# Patient Record
Sex: FEMALE | Race: WHITE | Employment: OTHER | RURAL
[De-identification: names, ages, dates, MRNs, and addresses within clinical notes are randomized per-mention and may not be internally consistent; named-entity substitution may affect disease eponyms.]

---

## 2017-01-27 ENCOUNTER — OFFICE VISIT (OUTPATIENT)
Dept: FAMILY MEDICINE CLINIC | Age: 72
End: 2017-01-27

## 2017-01-27 VITALS
WEIGHT: 172 LBS | TEMPERATURE: 98 F | DIASTOLIC BLOOD PRESSURE: 75 MMHG | OXYGEN SATURATION: 97 % | SYSTOLIC BLOOD PRESSURE: 119 MMHG | HEIGHT: 63 IN | BODY MASS INDEX: 30.48 KG/M2 | RESPIRATION RATE: 16 BRPM | HEART RATE: 82 BPM

## 2017-01-27 DIAGNOSIS — R82.998 LEUKOCYTES IN URINE: ICD-10-CM

## 2017-01-27 DIAGNOSIS — Z90.710 S/P HYSTERECTOMY: ICD-10-CM

## 2017-01-27 DIAGNOSIS — N93.9 ABNORMAL VAGINAL BLEEDING: Primary | ICD-10-CM

## 2017-01-27 LAB
BILIRUB UR QL STRIP: NEGATIVE
GLUCOSE UR-MCNC: NEGATIVE MG/DL
KETONES P FAST UR STRIP-MCNC: NEGATIVE MG/DL
PH UR STRIP: NORMAL [PH] (ref 4.6–8)
PROT UR QL STRIP: NEGATIVE MG/DL
SP GR UR STRIP: 1.02 (ref 1–1.03)
UA UROBILINOGEN AMB POC: NORMAL (ref 0.2–1)
URINALYSIS CLARITY POC: CLEAR
URINALYSIS COLOR POC: YELLOW
URINE BLOOD POC: NEGATIVE
URINE LEUKOCYTES POC: NORMAL
URINE NITRITES POC: NEGATIVE

## 2017-01-27 NOTE — PROGRESS NOTES
Andrew Shields is a 66 y.o. female who presents to the office today with the following:  Chief Complaint   Patient presents with    Vaginal Bleeding       HPI  Noticed some vaginal spotting yesterday. States saw on her underwear, was not noticed on TP or with urination or BM. Had hysterectomy at 54yo - pt reports due to menorrhagia. Prior to that UTD on PAPs, no abnls. No personal hx of cancer, but does have +Fam hx of breast CA. Last FOBT 4/8/2016 neg. Otherwise has been feeling well with no other complaints (other than notes occasionally gets little lightheaded when stands quickly, but does not think related)  Denies any vaginal pain, irritation, lesions, dysuria, hematuria, or any other  sxs. Has also had no fever/chills, n/v/d, or abd pain. Review of Systems   Constitutional: Negative for chills, fever, malaise/fatigue and weight loss. Respiratory: Negative for shortness of breath. Cardiovascular: Negative for chest pain. Gastrointestinal: Negative for abdominal pain, blood in stool, diarrhea, melena, nausea and vomiting. Genitourinary: Negative. Negative for dysuria, flank pain, frequency, hematuria and urgency. Musculoskeletal: Negative for myalgias. Allergies   Allergen Reactions    Ace Inhibitors Cough    Lipitor [Atorvastatin] Myalgia       Current Outpatient Prescriptions   Medication Sig    cholecalciferol (VITAMIN D3) 1,000 unit cap Take  by mouth daily.  FLUoxetine (PROZAC) 20 mg capsule Take 1 Cap by mouth daily.  losartan-hydrochlorothiazide (HYZAAR) 100-25 mg per tablet Take 1 Tab by mouth daily.  rosuvastatin (CRESTOR) 40 mg tablet Take 1 Tab by mouth nightly.  fenofibrate nanocrystallized (TRICOR) 145 mg tablet Take 1 Tab by mouth daily.  acetaminophen (TYLENOL) 500 mg tablet Take  by mouth every six (6) hours as needed for Pain. No current facility-administered medications for this visit.         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    Depression     Hyperlipidemia     Metabolic syndrome     Vitamin D deficiency        Past Surgical History   Procedure Laterality Date    Hx gyn  1996     [facility]     Pr breast surgery procedure unlisted  8/2010     Breast BX; negative    Hx colonoscopy       nl, done in [place]       Social History     Social History    Marital status: UNKNOWN     Spouse name: N/A    Number of children: N/A    Years of education: N/A     Social History Main Topics    Smoking status: Never Smoker    Smokeless tobacco: Never Used    Alcohol use 0.0 oz/week     0 Standard drinks or equivalent per week      Comment: social    Drug use: No    Sexual activity: No     Other Topics Concern     Service No    Blood Transfusions No    Caffeine Concern No    Occupational Exposure No    Hobby Hazards No    Sleep Concern No    Stress Concern No    Weight Concern No    Special Diet No    Back Care No    Exercise Yes     goes to [employer] No    Seat Belt Yes    Self-Exams Yes     Social History Narrative       Family History   Problem Relation Age of Onset    Cancer Mother      breast    Hypertension Brother     Elevated Lipids Brother     Cancer Maternal Aunt      breast         Physical Exam:  Visit Vitals    /75 (BP 1 Location: Left arm, BP Patient Position: Sitting)    Pulse 82    Temp 98 °F (36.7 °C) (Oral)    Resp 16    Ht 5' 3\" (1.6 m)    Wt 172 lb (78 kg)    SpO2 97%    BMI 30.47 kg/m2     Physical Exam   Constitutional: She is oriented to person, place, and time and well-developed, well-nourished, and in no distress. HENT:   Head: Normocephalic and atraumatic. Mouth/Throat: Oropharynx is clear and moist.   Eyes: Conjunctivae and EOM are normal.   Neck: Normal range of motion. Neck supple. Cardiovascular: Normal rate and regular rhythm. Pulmonary/Chest: Effort normal and breath sounds normal.   Abdominal: Soft.  There is no tenderness. Genitourinary: Vulva normal.   Genitourinary Comments: External exam unremarkable. Musculoskeletal: Normal range of motion. Lymphadenopathy:     She has no cervical adenopathy. Neurological: She is alert and oriented to person, place, and time. Gait normal.   Skin: Skin is warm and dry. Psychiatric: Mood and affect normal.   Nursing note and vitals reviewed. Assessment/Plan:    ICD-10-CM ICD-9-CM    1. Abnormal vaginal bleeding N93.9 623.8 AMB POC URINALYSIS DIP STICK MANUAL W/O MICRO   2. S/P hysterectomy Z90.710 V88.01    3. Leukocytes in urine R82.99 791.7 CULTURE, URINE     Results for orders placed or performed in visit on 01/27/17   AMB POC URINALYSIS DIP STICK MANUAL W/O MICRO   Result Value Ref Range    Color (UA POC) Yellow     Clarity (UA POC) Clear     Glucose (UA POC) Negative Negative    Bilirubin (UA POC) Negative Negative    Ketones (UA POC) Negative Negative    Specific gravity (UA POC) 1.020 1.001 - 1.035    Blood (UA POC) Negative Negative    pH (UA POC)  4.6 - 8.0    Protein (UA POC) Negative Negative mg/dL    Urobilinogen (UA POC) 0.2 mg/dL 0.2 - 1    Nitrites (UA POC) Negative Negative    Leukocyte esterase (UA POC) 1+ Negative     Pt declines full pelvic exam w/speculum in office today. No external  abnls noted. Also declines rectal with hemoccult due to neg earlier this yr. Strongly rec f/u with her GYN Dr. Amol Merida for full examination soon. Pt agrees and will make apt. Otherwise may call or rtc as needed. Follow-up Disposition:  Return if symptoms worsen or fail to improve.     Raegan Bermudez PA-C

## 2017-01-27 NOTE — MR AVS SNAPSHOT
Visit Information Date & Time Provider Department Dept. Phone Encounter #  
 1/27/2017  3:00 PM Leotha Claude, PA-C [address] [facility] [phone] [id number] Upcoming Health Maintenance Date Due DTaP/Tdap/Td series (1 - Tdap) 5/8/1964 ZOSTER VACCINE AGE 60> 5/8/2003 MEDICARE YEARLY EXAM 3/18/2017 FOBT Q 1 YEAR AGE 50-75 4/8/2017 BREAST CANCER SCRN MAMMOGRAM 1/18/2018 GLAUCOMA SCREENING Q2Y 4/15/2018 Allergies as of 1/27/2017  Review Complete On: 1/27/2017 By: Leotha Claude, PA-C Severity Noted Reaction Type Reactions Ace Inhibitors  04/08/2016    Cough Lipitor [Atorvastatin]  12/14/2013    Myalgia Current Immunizations  Never Reviewed Name Date Pneumococcal Conjugate (PCV-13) 10/21/2016 Pneumococcal Vaccine (Unspecified Type) 11/14/2012 Not reviewed this visit You Were Diagnosed With   
  
 Codes Comments Abnormal vaginal bleeding    -  Primary ICD-10-CM: N93.9 ICD-9-CM: 623.8 S/P hysterectomy     ICD-10-CM: Z90.710 ICD-9-CM: V88.01 Leukocytes in urine     ICD-10-CM: R82.99 
ICD-9-CM: 791.7 Vitals BP Pulse Temp Resp Height(growth percentile) Weight(growth percentile) 119/75 (BP 1 Location: Left arm, BP Patient Position: Sitting) 82 98 °F (36.7 °C) (Oral) 16 5' 3\" (1.6 m) 172 lb (78 kg) SpO2 BMI OB Status Smoking Status 97% 30.47 kg/m2 Hysterectomy Never Smoker Vitals History BMI and BSA Data Body Mass Index Body Surface Area  
 30.47 kg/m 2 1.86 m 2 Preferred Pharmacy Pharmacy Name Phone [facility], [address] [place] [address] Street  Your Updated Medication List  
  
   
This list is accurate as of: 1/27/17  3:59 PM.  Always use your most recent med list.  
  
  
  
  
 acetaminophen 500 mg tablet Commonly known as:  TYLENOL Take  by mouth every six (6) hours as needed for Pain. fenofibrate nanocrystallized 145 mg tablet Commonly known as:  [employer] Take 1 Tab by mouth daily. FLUoxetine 20 mg capsule Commonly known as:  PROzac Take 1 Cap by mouth daily. losartan-hydroCHLOROthiazide 100-25 mg per tablet Commonly known as:  HYZAAR Take 1 Tab by mouth daily. rosuvastatin 40 mg tablet Commonly known as:  CRESTOR Take 1 Tab by mouth nightly. VITAMIN D3 1,000 unit Cap Generic drug:  cholecalciferol Take  by mouth daily. We Performed the Following AMB POC URINALYSIS DIP STICK MANUAL W/O MICRO [98735 CPT(R)] CULTURE, URINE Y600031 CPT(R)] Introducing [facility] & HEALTH SERVICES! Jacinta Michael introduces [employer]Book[employer] patient portal. Now you can access parts of your medical record, email your doctor's office, and request medication refills online. 1. In your internet browser, go to [email or web address] 2. Click on the First Time User? Click Here link in the Sign In box. You will see the New Member Sign Up page. 3. Enter your BoardBookit Access Code exactly as it appears below. You will not need to use this code after youve completed the sign-up process. If you do not sign up before the expiration date, you must request a new code. · BoardBookit Access Code: [email or web address] Expires: 4/27/2017  3:50 PM 
 
4. Enter the last four digits of your Social Security Number (xxxx) and Date of Birth (mm/dd/yyyy) as indicated and click Submit. You will be taken to the next sign-up page. 5. Create a [employer] ID. This will be your BoardBookit login ID and cannot be changed, so think of one that is secure and easy to remember. 6. Create a BoardBookit password. You can change your password at any time. 7. Enter your Password Reset Question and Answer. This can be used at a later time if you forget your password. 8. Enter your e-mail address. You will receive e-mail notification when new information is available in [address] 9. Click Sign Up. You can now view and download portions of your medical record. 10. Click the Download Summary menu link to download a portable copy of your medical information. If you have questions, please visit the Frequently Asked Questions section of the [email or web address] website. Remember, [email or web address] is NOT to be used for urgent needs. For medical emergencies, dial 911. Now available from your iPhone and Android! Please provide this summary of care documentation to your next provider. Your primary care clinician is listed as Sophia Mariano. If you have any questions after today's visit, please call [phone].

## 2017-01-29 LAB — BACTERIA UR CULT: NORMAL

## 2017-03-02 ENCOUNTER — TELEPHONE (OUTPATIENT)
Dept: FAMILY MEDICINE CLINIC | Age: 72
End: 2017-03-02

## 2017-03-02 NOTE — TELEPHONE ENCOUNTER
Calling to f/u with pt. She reports her sxs resolved so did not f/u with GYN Dr. Jeison Carrera as instructed. Asked if she should still go and advised she should, at least for annual check-up to ensure things are okay. Pt reluctantly agrees and says she will Derril Senna to work herself up to it\" and thanks me for calling.

## 2017-04-04 DIAGNOSIS — E78.5 HYPERLIPIDEMIA, UNSPECIFIED HYPERLIPIDEMIA TYPE: ICD-10-CM

## 2017-04-04 RX ORDER — ROSUVASTATIN CALCIUM 40 MG/1
40 TABLET, COATED ORAL
Qty: 30 TAB | Refills: 0 | Status: SHIPPED | OUTPATIENT
Start: 2017-04-04 | End: 2017-04-26 | Stop reason: SDUPTHER

## 2017-04-26 ENCOUNTER — OFFICE VISIT (OUTPATIENT)
Dept: FAMILY MEDICINE CLINIC | Age: 72
End: 2017-04-26

## 2017-04-26 VITALS
HEART RATE: 74 BPM | RESPIRATION RATE: 20 BRPM | TEMPERATURE: 98.5 F | BODY MASS INDEX: 30.3 KG/M2 | OXYGEN SATURATION: 96 % | HEIGHT: 63 IN | DIASTOLIC BLOOD PRESSURE: 78 MMHG | SYSTOLIC BLOOD PRESSURE: 152 MMHG | WEIGHT: 171 LBS

## 2017-04-26 DIAGNOSIS — E55.9 VITAMIN D DEFICIENCY: ICD-10-CM

## 2017-04-26 DIAGNOSIS — Z00.00 MEDICARE ANNUAL WELLNESS VISIT, SUBSEQUENT: ICD-10-CM

## 2017-04-26 DIAGNOSIS — Z12.31 ENCOUNTER FOR MAMMOGRAM TO ESTABLISH BASELINE MAMMOGRAM: ICD-10-CM

## 2017-04-26 DIAGNOSIS — E78.2 MIXED HYPERLIPIDEMIA: Primary | ICD-10-CM

## 2017-04-26 DIAGNOSIS — F32.A DEPRESSION, UNSPECIFIED DEPRESSION TYPE: ICD-10-CM

## 2017-04-26 DIAGNOSIS — R73.09 ELEVATED GLUCOSE: ICD-10-CM

## 2017-04-26 DIAGNOSIS — Z12.11 COLON CANCER SCREENING: ICD-10-CM

## 2017-04-26 DIAGNOSIS — I10 ESSENTIAL HYPERTENSION WITH GOAL BLOOD PRESSURE LESS THAN 140/90: ICD-10-CM

## 2017-04-26 RX ORDER — FLUOXETINE HYDROCHLORIDE 20 MG/1
20 CAPSULE ORAL DAILY
Qty: 90 CAP | Refills: 1 | Status: SHIPPED | OUTPATIENT
Start: 2017-04-26 | End: 2017-11-24 | Stop reason: SDUPTHER

## 2017-04-26 RX ORDER — FENOFIBRATE 145 MG/1
145 TABLET, COATED ORAL DAILY
Qty: 90 TAB | Refills: 1 | Status: SHIPPED | OUTPATIENT
Start: 2017-04-26 | End: 2017-11-27 | Stop reason: SDUPTHER

## 2017-04-26 RX ORDER — LOSARTAN POTASSIUM AND HYDROCHLOROTHIAZIDE 25; 100 MG/1; MG/1
1 TABLET ORAL DAILY
Qty: 90 TAB | Refills: 1 | Status: SHIPPED | OUTPATIENT
Start: 2017-04-26 | End: 2017-11-01 | Stop reason: SDUPTHER

## 2017-04-26 RX ORDER — ROSUVASTATIN CALCIUM 40 MG/1
40 TABLET, COATED ORAL
Qty: 90 TAB | Refills: 1 | Status: SHIPPED | OUTPATIENT
Start: 2017-04-26 | End: 2017-11-01 | Stop reason: SDUPTHER

## 2017-04-26 NOTE — PROGRESS NOTES
HISTORY OF PRESENT ILLNESS  Jose Solomon is a 66 y.o. female. Chief Complaint   Patient presents with   [facility] Annual Wellness Visit         Medication Refill     Crestor, Prozac, Hyzaar, Tricor       HPI  HTN  No home checks  No SE with meds    Hyperlipidemia  Not fasting  Due for BW and refills    Depression  Still getting frustrated  On Prozac  Needs the med  No SE    Hx of elevated Glucose  Watching diet    HM reviewed    Review of Systems   Constitutional: Negative for weight loss. HENT: Negative for congestion. Eyes: Negative for blurred vision. Respiratory: Negative for cough and shortness of breath. Cardiovascular: Negative for chest pain. Gastrointestinal: Negative for blood in stool. Genitourinary: Negative for frequency and hematuria. Neurological: Negative for dizziness and headaches. Endo/Heme/Allergies: Negative for polydipsia. Psychiatric/Behavioral: Positive for depression. 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 90 Tab 1    FLUoxetine (PROZAC) 20 mg capsule Take 1 Cap by mouth daily. 90 Cap 1    losartan-hydroCHLOROthiazide (HYZAAR) 100-25 mg per tablet Take 1 Tab by mouth daily. 90 Tab 1    fenofibrate nanocrystallized (TRICOR) 145 mg tablet Take 1 Tab by mouth daily. 90 Tab 1    cholecalciferol (VITAMIN D3) 1,000 unit cap Take  by mouth daily.  acetaminophen (TYLENOL) 500 mg tablet Take  by mouth every six (6) hours as needed for Pain.        Allergies   Allergen Reactions    Ace Inhibitors Cough    Lipitor [Atorvastatin] Myalgia     Visit Vitals    /78 (BP 1 Location: Right arm, BP Patient Position: Sitting)    Pulse 74    Temp 98.5 °F (36.9 °C) (Temporal)    Resp 20    Ht 5' 3\" (1.6 m)    [employer] 171 lb (77.6 kg)    SpO2 96%    BMI 30.29 kg/m2       Physical Exam   Constitutional: She is oriented to person, place, and time. She appears well-developed and well-nourished. No distress. HENT:   Head: Normocephalic and atraumatic. Right Ear: External ear normal.   Left Ear: External ear normal.   Mouth/Throat: Oropharynx is clear and moist. No oropharyngeal exudate. Swollen turbinates   Eyes: Conjunctivae and EOM are normal.   Neck: No thyromegaly present. Cardiovascular: Normal rate and regular rhythm. Pulmonary/Chest: Effort normal and breath sounds normal.   Musculoskeletal: She exhibits no edema. Lymphadenopathy:     She has no cervical adenopathy. Neurological: She is alert and oriented to person, place, and time. Skin: Skin is warm and dry. Psychiatric: She has a normal mood and affect. Nursing note and vitals reviewed. ASSESSMENT and PLAN    ICD-10-CM ICD-9-CM    1. Mixed hyperlipidemia E78.2 272.2 LIPID PANEL WITH LDL/HDL RATIO      rosuvastatin (CRESTOR) 40 mg tablet      fenofibrate nanocrystallized (TRICOR) 145 mg tablet   2. Essential hypertension with goal blood pressure less than 140/90 X55 631.3 METABOLIC PANEL, COMPREHENSIVE      losartan-hydroCHLOROthiazide (HYZAAR) 100-25 mg per tablet   3. Vitamin D deficiency E55.9 268.9 VITAMIN D, 25 HYDROXY   4. Elevated glucose R73.09 790.29 HEMOGLOBIN A1C W/O EAG      CBC WITH AUTOMATED DIFF   5. Medicare annual wellness visit, subsequent Z00.00 V70.0    6. Depression, unspecified depression type F32.9 311 FLUoxetine (PROZAC) 20 mg capsule   7. Colon cancer screening Z12.11 V76.51 OCCULT BLOOD, IMMUNOASSAY (FIT)   8.  Encounter for mammogram to establish baseline mammogram Z12.31 V76.12 [facility] SCREENING INCL CAD

## 2017-04-26 NOTE — MR AVS SNAPSHOT
Visit Information Date & Time Provider Department Dept. Phone Encounter #  
 4/26/2017 10:00 AM Hope Bejarano MD [address] [id number] Follow-up Instructions Return in about 6 months (around 10/26/2017). Follow-up and Disposition History Upcoming Health Maintenance Date Due DTaP/Tdap/Td series (1 - Tdap) 5/8/1964 ZOSTER VACCINE AGE 60> 5/8/2003 MEDICARE YEARLY EXAM 3/18/2017 FOBT Q 1 YEAR AGE 50-75 4/8/2017 BREAST CANCER SCRN MAMMOGRAM 1/18/2018 GLAUCOMA SCREENING Q2Y 4/15/2018 Allergies as of 4/26/2017  Review Complete On: 4/26/2017 By: Vee Bennett LPN Severity Noted Reaction Type Reactions Ace Inhibitors  04/08/2016    Cough Lipitor [Atorvastatin]  12/14/2013    Myalgia Current Immunizations  Never Reviewed Name Date Pneumococcal Conjugate (PCV-13) 10/21/2016 Pneumococcal Vaccine (Unspecified Type) 11/14/2012 Not reviewed this visit You Were Diagnosed With   
  
 Codes Comments Mixed hyperlipidemia    -  Primary ICD-10-CM: B37.2 ICD-9-CM: 272.2 Essential hypertension with goal blood pressure less than 140/90     ICD-10-CM: I10 
ICD-9-CM: 401.9 Vitamin D deficiency     ICD-10-CM: E55.9 ICD-9-CM: 268.9 Elevated glucose     ICD-10-CM: R73.09 
ICD-9-CM: 790.29 Medicare annual wellness visit, subsequent     ICD-10-CM: Z00.00 ICD-9-CM: V70.0 Depression, unspecified depression type     ICD-10-CM: F32.9 ICD-9-CM: 118 Colon cancer screening     ICD-10-CM: Z12.11 ICD-9-CM: V76.51 Encounter for mammogram to establish baseline mammogram     ICD-10-CM: Z12.31 
ICD-9-CM: V76.12 Vitals BP Pulse Temp Resp Height(growth percentile) 152/78 (BP 1 Location: Right arm, BP Patient Position: Sitting) 74 98.5 °F (36.9 °C) (Temporal) 20 5' 3\" (1.6 m) Weight(growth percentile) SpO2 BMI OB Status Smoking Status 171 lb (77.6 kg) 96% 30.29 kg/m2 Hysterectomy Never Smoker Vitals History BMI and BSA Data Body Mass Index Body Surface Area  
 30.29 kg/m 2 1.86 m 2 Preferred Pharmacy Pharmacy Name Phone [facility] [address], [address] [place] [address] Your Updated Medication List  
  
   
This list is accurate as of: 4/26/17 10:58 AM.  Always use your most recent med list.  
  
  
  
  
 acetaminophen 500 mg tablet Commonly known as:  TYLENOL Take  by mouth every six (6) hours as needed for Pain. fenofibrate nanocrystallized 145 mg tablet Commonly known as:  [employer] Take 1 Tab by mouth daily. FLUoxetine 20 mg capsule Commonly known as:  PROzac Take 1 Cap by mouth daily. losartan-hydroCHLOROthiazide 100-25 mg per tablet Commonly known as:  HYZAAR Take 1 Tab by mouth daily. rosuvastatin 40 mg tablet Commonly known as:  CRESTOR Take 1 Tab by mouth nightly. VITAMIN D3 1,000 unit Cap Generic drug:  cholecalciferol Take  by mouth daily. Prescriptions Sent to Pharmacy Refills  
 rosuvastatin (CRESTOR) 40 mg tablet 1 Sig: Take 1 Tab by mouth nightly. Class: Normal  
 Pharmacy: [facility] [place] [address] Ph #: [phone] Route: Oral  
 FLUoxetine (PROZAC) 20 mg capsule 1 Sig: Take 1 Cap by mouth daily. Class: Normal  
 Pharmacy: [facility] [place] [address] Ph #: [phone] Route: Oral  
 losartan-hydroCHLOROthiazide (HYZAAR) 100-25 mg per tablet 1 Sig: Take 1 Tab by mouth daily. Class: Normal  
 Pharmacy: [facility] [place] [address] &  Ph #: [phone] Route: Oral  
 fenofibrate nanocrystallized (TRICOR) 145 mg tablet 1 Sig: Take 1 Tab by mouth daily. Class: Normal  
 Pharmacy: [facility] [place] [address] &  Ph #: [phone]  Route: Oral  
  
 We Performed the Following CBC WITH AUTOMATED DIFF [71026 CPT(R)] HEMOGLOBIN A1C W/O EAG [40091 CPT(R)] LIPID PANEL WITH LDL/HDL RATIO [87687 CPT(R)] METABOLIC PANEL, COMPREHENSIVE [07430 CPT(R)] VITAMIN D, 25 HYDROXY [id number] CPT(R)] Follow-up Instructions Return in about 6 months (around 10/26/2017). To-Do List   
 04/26/2017 Lab:  OCCULT BLOOD, IMMUNOASSAY (FIT)   
  
 05/26/2017 Imaging:  [facility] SCREENING INCL CAD Patient Instructions Schedule of Personalized Health Plan (Provide Copy to Patient) The best way to stay healthy is to live a healthy lifestyle. A healthy lifestyle includes regular exercise, eating a well-balanced diet, keeping a healthy weight and not smoking. Regular physical exams and screening tests are another important way to take care of yourself. Preventive exams provided by health care providers can find health problems early when treatment works best and can keep you from getting certain diseases or illnesses. Preventive services include exams, lab tests, screenings, shots, monitoring and information to help you take care of your own health. All people over 65 should have a pneumonia shot. Pneumonia shots are usually only needed once in a lifetime unless your doctor decides differently. All people over 65 should have a yearly flu shot. People over 65 are at medium to high risk for Hepatitis B. Three shots are needed for complete protection. In addition to your physical exam, some screening tests are recommended: 
 
Bone mass measurement (dexa scan) is recommended every two years Diabetes Mellitus screening is recommended every year. Glaucoma is an eye disease caused by high pressure in the eye. An eye exam is recommended every year. Cardiovascular screening tests that check your cholesterol and other blood fat (lipid) levels are recommended every five years. Colorectal Cancer screening tests help to find pre-cancerous polyps (growths in the colon) so they can be removed before they turn into cancer. Tests ordered for screening depend on your personal and family history risk factors. Screening for Breast Cancer is recommended yearly with a mammogram. 
 
Screening for Cervical Cancer is recommended every two years (annually for certain risk factors, such as previous history of STD or abnormal PAP in past 7 years), with a Pelvic Exam with PAP Here is a list of your current Health Maintenance items with a due date: 
Health Maintenance Topic Date Due  
 DTaP/Tdap/Td series (1 - Tdap) 05/08/1964  ZOSTER VACCINE AGE 60>  05/08/2003  MEDICARE YEARLY EXAM  03/18/2017  
 FOBT Q 1 YEAR AGE 50-75  04/08/2017  BREAST CANCER SCRN MAMMOGRAM  01/18/2018  GLAUCOMA SCREENING Q2Y  04/15/2018  
 OSTEOPOROSIS SCREENING (DEXA)  Completed  Pneumococcal 65+ Low/Medium Risk  Completed  INFLUENZA AGE 9 TO ADULT  Addressed Introducing \[facility]\"" & HEALTH SERVICES! [facility] introduces [employer] patient portal. Now you can access parts of your medical record, email your doctor's office, and request medication refills online. 1. In your internet browser, go to [email or web address] 2. Click on the First Time User? Click Here link in the Sign In box. You will see the New Member Sign Up page. 3. Enter your W[employer] Access Code exactly as it appears below. You will not need to use this code after youve completed the sign-up process. If you do not sign up before the expiration date, you must request a new code. · [employer] Access Code: [email or web address] Expires: 4/27/2017  4:50 PM 
 
4. Enter the last four digits of your Social Security Number (xxxx) and Date of Birth (mm/dd/yyyy) as indicated and click Submit. You will be taken to the next sign-up page. 5. Create a W[employer]Works ID.  This will be your W[employer]Works login ID and cannot be changed, so think of one that is secure and easy to remember. 6. Create a [employer] password. You can change your password at any time. 7. Enter your Password Reset Question and Answer. This can be used at a later time if you forget your password. 8. Enter your e-mail address. You will receive e-mail notification when new information is available in [address] 9. Click Sign Up. You can now view and download portions of your medical record. 10. Click the Download Summary menu link to download a portable copy of your medical information. If you have questions, please visit the Frequently Asked Questions section of the [employer] website. Remember, [employer] is NOT to be used for urgent needs. For medical emergencies, dial 911. Now available from your iPhone and Android! Please provide this summary of care documentation to your next provider. Your primary care clinician is listed as Sophia Mariano. If you have any questions after today's visit, please call [phone].

## 2017-04-26 NOTE — PATIENT INSTRUCTIONS
Schedule of Personalized Health Plan  (Provide Copy to Patient)  The best way to stay healthy is to live a healthy lifestyle. A healthy lifestyle includes regular exercise, eating a well-balanced diet, keeping a healthy weight and not smoking. Regular physical exams and screening tests are another important way to take care of yourself. Preventive exams provided by health care providers can find health problems early when treatment works best and can keep you from getting certain diseases or illnesses. Preventive services include exams, lab tests, screenings, shots, monitoring and information to help you take care of your own health. All people over 65 should have a pneumonia shot. Pneumonia shots are usually only needed once in a lifetime unless your doctor decides differently. All people over 65 should have a yearly flu shot. People over 65 are at medium to high risk for Hepatitis B. Three shots are needed for complete protection. In addition to your physical exam, some screening tests are recommended:    Bone mass measurement (dexa scan) is recommended every two years  Diabetes Mellitus screening is recommended every year. Glaucoma is an eye disease caused by high pressure in the eye. An eye exam is recommended every year. Cardiovascular screening tests that check your cholesterol and other blood fat (lipid) levels are recommended every five years. Colorectal Cancer screening tests help to find pre-cancerous polyps (growths in the colon) so they can be removed before they turn into cancer. Tests ordered for screening depend on your personal and family history risk factors.     Screening for Breast Cancer is recommended yearly with a mammogram.    Screening for Cervical Cancer is recommended every two years (annually for certain risk factors, such as previous history of STD or abnormal PAP in past 7 years), with a Pelvic Exam with PAP    Here is a list of your current Health Maintenance items with a due date:  Health Maintenance   Topic Date Due    DTaP/Tdap/Td series (1 - Tdap) 05/08/1964    ZOSTER VACCINE AGE 60>  05/08/2003    MEDICARE YEARLY EXAM  03/18/2017    FOBT Q 1 YEAR AGE 50-75  04/08/2017    BREAST CANCER SCRN MAMMOGRAM  01/18/2018    GLAUCOMA SCREENING Q2Y  04/15/2018    OSTEOPOROSIS SCREENING (DEXA)  Completed    Pneumococcal 65+ Low/Medium Risk  Completed    INFLUENZA AGE 9 TO ADULT  Addressed

## 2017-04-26 NOTE — PROGRESS NOTES
Chief Complaint   Patient presents with   [facility] Annual Wellness Visit         Medication Refill     Crestor, Prozac, Hyzaar, Tricor     Medication taken at night. Ayala Dominique LPN      Patient has jACP on file. Ayala Dominique LPN      Beltran Mccoy is a 66 y.o. female and presents for annual Medicare Wellness Visit. Problem List: Reviewed with patient and discussed risk factors. Patient Active Problem List   Diagnosis Code    Depression F32.9    Hyperlipidemia E78.5    Arthritis A10.82    Metabolic syndrome Q00.79    Vitamin D deficiency E55.9    Advance directive discussed with patient Z70.80    Essential hypertension with goal blood pressure less than 140/90 I10    Mixed hyperlipidemia E78.2       Current medical providers:  Patient Care Team:  Mellissa Gonzalez MD as PCP - General (Family Practice)    PSH: Reviewed with patient  Past Surgical History:   Procedure Laterality Date    BREAST SURGERY PROCEDURE UNLISTED  8/2010    Breast BX; negative    HX COLONOSCOPY      nl, done in [address]         SH: Reviewed with patient  Social History   Substance Use Topics    Smoking status: Never Smoker    Smokeless tobacco: Never Used    Alcohol use 0.0 oz/week     0 Standard drinks or equivalent per week      Comment: social       FH: Reviewed with patient  Family History   Problem Relation Age of Onset    Cancer Mother      breast    Hypertension Brother     Elevated Lipids Brother     Cancer Maternal Aunt      breast       Medications/Allergies: Reviewed with patient  Current Outpatient Prescriptions on File Prior to Visit   Medication Sig Dispense Refill    rosuvastatin (CRESTOR) 40 mg tablet Take 1 Tab by mouth nightly. 30 Tab 0    cholecalciferol (VITAMIN D3) 1,000 unit cap Take  by mouth daily.  FLUoxetine (PROZAC) 20 mg capsule Take 1 Cap by mouth daily.  90 Cap 1    losartan-hydrochlorothiazide (HYZAAR) 100-25 mg per tablet Take 1 Tab by mouth daily. 90 Tab 1    fenofibrate nanocrystallized (TRICOR) 145 mg tablet Take 1 Tab by mouth daily. 90 Tab 1    acetaminophen (TYLENOL) 500 mg tablet Take  by mouth every six (6) hours as needed for Pain. No current facility-administered medications on file prior to visit. Allergies   Allergen Reactions    Ace Inhibitors Cough    Lipitor [Atorvastatin] Myalgia       Objective:  Visit Vitals    Ht 5' 3\" (1.6 m)    Wt 171 lb (77.6 kg)    BMI 30.29 kg/m2    Body mass index is 30.29 kg/(m^2). Assessment of cognitive impairment: Alert and oriented x 3    Depression Screen: No flowsheet data found. Fall Risk Assessment:    Fall Risk Assessment, last 12 mths 4/26/2017   Able to walk? Yes   Fall in past 12 months? No       Functional Ability:   Does the patient exhibit a steady gait? yes   How long did it take the patient to get up and walk from a sitting position? 2 seconds   Is the patient self reliant?  (ie can do own laundry, meals, household chores)  yes     Does the patient handle his/her own medications? yes     Does the patient handle his/her own money? yes     Is the patients home safe (ie good lighting, handrails on stairs and bath, etc.)? yes     Did you notice or did patient express any hearing difficulties? no     Did you notice or did patient express any vision difficulties?   no     Were distance and reading eye charts used? no       Advance Care Planning:   Patient was offered the opportunity to discuss advance care planning:  yes     Does patient have an Advance Directive:  yes   If no, did you provide information on Caring Connections?   yes       Plan:      Orders Placed This Encounter    HEMOGLOBIN A1C W/O EAG    CBC WITH AUTOMATED DIFF    LIPID PANEL WITH LDL/HDL RATIO    METABOLIC PANEL, COMPREHENSIVE    VITAMIN D, 25 HYDROXY       Health Maintenance   Topic Date Due    DTaP/Tdap/Td series (1 - Tdap) 05/08/1964    ZOSTER VACCINE AGE 60>  05/08/2003    MEDICARE YEARLY EXAM  03/18/2017    FOBT Q 1 YEAR AGE 50-75  04/08/2017    BREAST CANCER SCRN MAMMOGRAM  01/18/2018    GLAUCOMA SCREENING Q2Y  04/15/2018    OSTEOPOROSIS SCREENING (DEXA)  Completed    Pneumococcal 65+ Low/Medium Risk  Completed    INFLUENZA AGE 9 TO ADULT  Addressed       *Patient verbalized understanding and agreement with the plan. A copy of the After Visit Summary with personalized health plan was given to the patient today.

## 2017-04-26 NOTE — ACP (ADVANCE CARE PLANNING)
Advance Care Planning (ACP) Provider Conversation Snapshot    Date of ACP Conversation: 04/26/17  Persons included in Conversation:  patient  Length of ACP Conversation in minutes:  <16 minutes (Non-Billable)      Has ACP on file and up to date

## 2017-04-27 LAB
25(OH)D3+25(OH)D2 SERPL-MCNC: 22.4 NG/ML (ref 30–100)
ALBUMIN SERPL-MCNC: 4.5 G/DL (ref 3.5–4.8)
ALBUMIN/GLOB SERPL: 1.7 {RATIO} (ref 1.2–2.2)
ALP SERPL-CCNC: 37 IU/L (ref 39–117)
ALT SERPL-CCNC: 19 IU/L (ref 0–32)
AST SERPL-CCNC: 21 IU/L (ref 0–40)
BASOPHILS # BLD AUTO: 0 X10E3/UL (ref 0–0.2)
BASOPHILS NFR BLD AUTO: 1 %
BILIRUB SERPL-MCNC: 0.3 MG/DL (ref 0–1.2)
BUN SERPL-MCNC: 22 MG/DL (ref 8–27)
BUN/CREAT SERPL: 22 (ref 12–28)
CALCIUM SERPL-MCNC: 10.3 MG/DL (ref 8.7–10.3)
CHLORIDE SERPL-SCNC: 103 MMOL/L (ref 96–106)
CHOLEST SERPL-MCNC: 227 MG/DL (ref 100–199)
CO2 SERPL-SCNC: 21 MMOL/L (ref 18–29)
CREAT SERPL-MCNC: 1.01 MG/DL (ref 0.57–1)
EOSINOPHIL # BLD AUTO: 0.3 X10E3/UL (ref 0–0.4)
EOSINOPHIL NFR BLD AUTO: 5 %
ERYTHROCYTE [DISTWIDTH] IN BLOOD BY AUTOMATED COUNT: 14.4 % (ref 12.3–15.4)
GLOBULIN SER CALC-MCNC: 2.7 G/DL (ref 1.5–4.5)
GLUCOSE SERPL-MCNC: 104 MG/DL (ref 65–99)
HBA1C MFR BLD: 6.1 % (ref 4.8–5.6)
HCT VFR BLD AUTO: 37 % (ref 34–46.6)
HDLC SERPL-MCNC: 68 MG/DL
HGB BLD-MCNC: 12.4 G/DL (ref 11.1–15.9)
IMM GRANULOCYTES # BLD: 0 X10E3/UL (ref 0–0.1)
IMM GRANULOCYTES NFR BLD: 0 %
INTERPRETATION, 910389: NORMAL
INTERPRETATION: NORMAL
LDLC SERPL CALC-MCNC: 126 MG/DL (ref 0–99)
LDLC/HDLC SERPL: 1.9 RATIO UNITS (ref 0–3.2)
LYMPHOCYTES # BLD AUTO: 1.7 X10E3/UL (ref 0.7–3.1)
LYMPHOCYTES NFR BLD AUTO: 35 %
MCH RBC QN AUTO: 29 PG (ref 26.6–33)
MCHC RBC AUTO-ENTMCNC: 33.5 G/DL (ref 31.5–35.7)
MCV RBC AUTO: 86 FL (ref 79–97)
MONOCYTES # BLD AUTO: 0.3 X10E3/UL (ref 0.1–0.9)
MONOCYTES NFR BLD AUTO: 6 %
NEUTROPHILS # BLD AUTO: 2.5 X10E3/UL (ref 1.4–7)
NEUTROPHILS NFR BLD AUTO: 53 %
PDF IMAGE, 910387: NORMAL
PLATELET # BLD AUTO: 277 X10E3/UL (ref 150–379)
POTASSIUM SERPL-SCNC: 4.4 MMOL/L (ref 3.5–5.2)
PROT SERPL-MCNC: 7.2 G/DL (ref 6–8.5)
RBC # BLD AUTO: 4.28 X10E6/UL (ref 3.77–5.28)
SODIUM SERPL-SCNC: 141 MMOL/L (ref 134–144)
TRIGL SERPL-MCNC: 164 MG/DL (ref 0–149)
VLDLC SERPL CALC-MCNC: 33 MG/DL (ref 5–40)
WBC # BLD AUTO: 4.7 X10E3/UL (ref 3.4–10.8)

## 2017-04-27 RX ORDER — ASPIRIN 325 MG
TABLET, DELAYED RELEASE (ENTERIC COATED) ORAL
Qty: 4 CAP | Refills: 3 | Status: SHIPPED | OUTPATIENT
Start: 2017-04-27 | End: 2017-12-01 | Stop reason: SDUPTHER

## 2017-12-01 ENCOUNTER — OFFICE VISIT (OUTPATIENT)
Dept: FAMILY MEDICINE CLINIC | Age: 72
End: 2017-12-01

## 2017-12-01 VITALS
HEIGHT: 63 IN | TEMPERATURE: 97.1 F | RESPIRATION RATE: 18 BRPM | HEART RATE: 63 BPM | BODY MASS INDEX: 29.41 KG/M2 | SYSTOLIC BLOOD PRESSURE: 142 MMHG | OXYGEN SATURATION: 98 % | DIASTOLIC BLOOD PRESSURE: 70 MMHG | WEIGHT: 166 LBS

## 2017-12-01 DIAGNOSIS — E78.2 MIXED HYPERLIPIDEMIA: Primary | ICD-10-CM

## 2017-12-01 DIAGNOSIS — E55.9 VITAMIN D DEFICIENCY: ICD-10-CM

## 2017-12-01 DIAGNOSIS — R73.09 ELEVATED GLUCOSE: ICD-10-CM

## 2017-12-01 DIAGNOSIS — I10 ESSENTIAL HYPERTENSION: ICD-10-CM

## 2017-12-01 DIAGNOSIS — F32.A DEPRESSION, UNSPECIFIED DEPRESSION TYPE: ICD-10-CM

## 2017-12-01 DIAGNOSIS — Z12.11 COLON CANCER SCREENING: ICD-10-CM

## 2017-12-01 RX ORDER — ROSUVASTATIN CALCIUM 40 MG/1
TABLET, COATED ORAL
Qty: 90 TAB | Refills: 1 | Status: SHIPPED | OUTPATIENT
Start: 2017-12-01 | End: 2018-08-23 | Stop reason: SDUPTHER

## 2017-12-01 RX ORDER — LOSARTAN POTASSIUM AND HYDROCHLOROTHIAZIDE 25; 100 MG/1; MG/1
TABLET ORAL
Qty: 90 TAB | Refills: 1 | Status: SHIPPED | OUTPATIENT
Start: 2017-12-01 | End: 2018-06-12 | Stop reason: DRUGHIGH

## 2017-12-01 RX ORDER — FLUOXETINE HYDROCHLORIDE 20 MG/1
CAPSULE ORAL
Qty: 90 CAP | Refills: 1 | Status: SHIPPED | OUTPATIENT
Start: 2017-12-01 | End: 2018-07-02 | Stop reason: SDUPTHER

## 2017-12-01 RX ORDER — FENOFIBRATE 145 MG/1
TABLET, COATED ORAL
Qty: 90 TAB | Refills: 1 | Status: SHIPPED | OUTPATIENT
Start: 2017-12-01 | End: 2018-08-09 | Stop reason: SDUPTHER

## 2017-12-01 RX ORDER — ASPIRIN 325 MG
TABLET, DELAYED RELEASE (ENTERIC COATED) ORAL
Qty: 12 CAP | Refills: 1 | Status: SHIPPED | OUTPATIENT
Start: 2017-12-01 | End: 2017-12-03 | Stop reason: SDUPTHER

## 2017-12-01 NOTE — PROGRESS NOTES
HISTORY OF PRESENT ILLNESS  Amber Carney is a 74 y.o. female. Chief Complaint   Patient presents with    Follow-up     Kam called her for 6 mo follow up, refills, labs if needed       HPI  Here for fasting BW    HTN  No BP checks at home    Hyperlipidemia  No SE with meds    Vit D def  Last time low  Taking high dose once a week    Depression  Needs Prozac still  Helping    Elevated Glucose  Watching diet  Lost 5 lbs    Review of Systems   Constitutional: Negative for weight loss. Respiratory: Negative for cough. Cardiovascular: Negative for chest pain. Gastrointestinal: Negative for blood in stool. Genitourinary: Negative for hematuria. 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    Depression     Endocrine disorder     not current      Hyperlipidemia     Menopause     Metabolic syndrome     Vitamin D deficiency      Current Outpatient Prescriptions   Medication Sig Dispense Refill    FLUoxetine (PROZAC) 20 mg capsule TAKE ONE CAPSULE BY MOUTH EVERY DAY 90 Cap 1    fenofibrate nanocrystallized (TRICOR) 145 mg tablet TAKE 1 TABLET BY MOUTH EVERY DAY 90 Tab 1    rosuvastatin (CRESTOR) 40 mg tablet TAKE 1 TABLET BY MOUTH NIGHTLY 90 Tab 1    losartan-hydroCHLOROthiazide (HYZAAR) 100-25 mg per tablet TAKE 1 TABLET BY MOUTH EVERY DAY 90 Tab 1    Cholecalciferol, Vitamin D3, 50,000 unit cap Take one a week  Indications: Vitamin D Deficiency 12 Cap 1    acetaminophen (TYLENOL) 500 mg tablet Take  by mouth every six (6) hours as needed for Pain. Allergies   Allergen Reactions    Ace Inhibitors Cough    Lipitor [Atorvastatin] Myalgia     Visit Vitals    /70    Pulse 63    Temp 97.1 °F (36.2 °C) (Temporal)    Resp 18    Ht 5' 3\" (1.6 m)    Wt 166 lb (75.3 kg)    SpO2 98%    BMI 29.41 kg/m2     Physical Exam   Constitutional: She is oriented to person, place, and time. She appears well-developed and well-nourished. No distress.    HENT:   Head: Normocephalic and atraumatic. Right Ear: External ear normal.   Left Ear: External ear normal.   Mouth/Throat: Oropharynx is clear and moist. No oropharyngeal exudate. Eyes: Conjunctivae and EOM are normal. Pupils are equal, round, and reactive to light. Cardiovascular: Normal rate, regular rhythm and normal heart sounds. Pulmonary/Chest: Effort normal and breath sounds normal.   Musculoskeletal: She exhibits no edema. Lymphadenopathy:     She has no cervical adenopathy. Neurological: She is alert and oriented to person, place, and time. Skin: Skin is warm and dry. Psychiatric: She has a normal mood and affect. Nursing note and vitals reviewed. ASSESSMENT and PLAN    ICD-10-CM ICD-9-CM    1. Mixed hyperlipidemia E78.2 272.2 LIPID PANEL WITH LDL/HDL RATIO      fenofibrate nanocrystallized (TRICOR) 145 mg tablet      rosuvastatin (CRESTOR) 40 mg tablet   2. Essential hypertension X59 590.2 METABOLIC PANEL, COMPREHENSIVE      losartan-hydroCHLOROthiazide (HYZAAR) 100-25 mg per tablet   3. Vitamin D deficiency E55.9 268.9 VITAMIN D, 25 HYDROXY      Cholecalciferol, Vitamin D3, 50,000 unit cap   4. Depression, unspecified depression type F32.9 311 FLUoxetine (PROZAC) 20 mg capsule   5. Elevated glucose R73.09 790.29 HEMOGLOBIN A1C W/O EAG   6.  Colon cancer screening Z12.11 V76.51 OCCULT BLOOD, IMMUNOASSAY (FIT)

## 2017-12-02 LAB
25(OH)D3+25(OH)D2 SERPL-MCNC: 21.6 NG/ML (ref 30–100)
ALBUMIN SERPL-MCNC: 4.6 G/DL (ref 3.5–4.8)
ALBUMIN/GLOB SERPL: 1.7 {RATIO} (ref 1.2–2.2)
ALP SERPL-CCNC: 46 IU/L (ref 39–117)
ALT SERPL-CCNC: 18 IU/L (ref 0–32)
AST SERPL-CCNC: 19 IU/L (ref 0–40)
BILIRUB SERPL-MCNC: 0.4 MG/DL (ref 0–1.2)
BUN SERPL-MCNC: 19 MG/DL (ref 8–27)
BUN/CREAT SERPL: 21 (ref 12–28)
CALCIUM SERPL-MCNC: 10.4 MG/DL (ref 8.7–10.3)
CHLORIDE SERPL-SCNC: 100 MMOL/L (ref 96–106)
CHOLEST SERPL-MCNC: 167 MG/DL (ref 100–199)
CO2 SERPL-SCNC: 23 MMOL/L (ref 18–29)
CREAT SERPL-MCNC: 0.91 MG/DL (ref 0.57–1)
GFR SERPLBLD CREATININE-BSD FMLA CKD-EPI: 62 ML/MIN/1.73
GFR SERPLBLD CREATININE-BSD FMLA CKD-EPI: 72 ML/MIN/1.73
GLOBULIN SER CALC-MCNC: 2.7 G/DL (ref 1.5–4.5)
GLUCOSE SERPL-MCNC: 100 MG/DL (ref 65–99)
HBA1C MFR BLD: 5.9 % (ref 4.8–5.6)
HDLC SERPL-MCNC: 71 MG/DL
INTERPRETATION, 910389: NORMAL
LDLC SERPL CALC-MCNC: 75 MG/DL (ref 0–99)
LDLC/HDLC SERPL: 1.1 RATIO UNITS (ref 0–3.2)
POTASSIUM SERPL-SCNC: 4.1 MMOL/L (ref 3.5–5.2)
PROT SERPL-MCNC: 7.3 G/DL (ref 6–8.5)
SODIUM SERPL-SCNC: 140 MMOL/L (ref 134–144)
TRIGL SERPL-MCNC: 105 MG/DL (ref 0–149)
VLDLC SERPL CALC-MCNC: 21 MG/DL (ref 5–40)

## 2017-12-03 DIAGNOSIS — E55.9 VITAMIN D DEFICIENCY: ICD-10-CM

## 2017-12-03 RX ORDER — ASPIRIN 325 MG
TABLET, DELAYED RELEASE (ENTERIC COATED) ORAL
Qty: 24 CAP | Refills: 1 | Status: SHIPPED | OUTPATIENT
Start: 2017-12-03 | End: 2020-01-13

## 2017-12-03 NOTE — PROGRESS NOTES
Call pt, the HbA1C is 5.9, better than last time, cont low conc sweets diet and recheck in 6mo  The Chol is good now, cont the med and recheck in 6 mo  The kidney and liver tests are normal  The Vit D is still low, I am increasing the dose to twice a week and recheck in 3-6 mo

## 2018-01-31 ENCOUNTER — OFFICE VISIT (OUTPATIENT)
Dept: FAMILY MEDICINE CLINIC | Age: 73
End: 2018-01-31

## 2018-01-31 VITALS
RESPIRATION RATE: 16 BRPM | DIASTOLIC BLOOD PRESSURE: 62 MMHG | WEIGHT: 164.2 LBS | TEMPERATURE: 98.5 F | BODY MASS INDEX: 29.09 KG/M2 | SYSTOLIC BLOOD PRESSURE: 124 MMHG | OXYGEN SATURATION: 96 % | HEART RATE: 67 BPM

## 2018-01-31 DIAGNOSIS — Z12.11 COLON CANCER SCREENING: ICD-10-CM

## 2018-01-31 DIAGNOSIS — J06.9 VIRAL UPPER RESPIRATORY TRACT INFECTION: Primary | ICD-10-CM

## 2018-01-31 LAB
BINAX NOW INFLUENZA: NEGATIVE
VALID INTERNAL CONTROL?: YES

## 2018-01-31 RX ORDER — BENZONATATE 200 MG/1
200 CAPSULE ORAL
COMMUNITY
End: 2018-01-31 | Stop reason: SDUPTHER

## 2018-01-31 RX ORDER — BENZONATATE 200 MG/1
200 CAPSULE ORAL
Qty: 15 CAP | Refills: 0 | Status: SHIPPED | OUTPATIENT
Start: 2018-01-31 | End: 2018-02-07 | Stop reason: SDUPTHER

## 2018-01-31 NOTE — PROGRESS NOTES
Chief Complaint   Patient presents with    Cough     persistent cough X3 days, slight runny nose, denies congestion, ribs hurt from coughing     Visit Vitals    /62 (BP 1 Location: Right arm, BP Patient Position: Sitting)    Pulse 67    Temp 98.5 °F (36.9 °C) (Oral)    Resp 16    Wt 164 lb 3.2 oz (74.5 kg)    SpO2 96%    BMI 29.09 kg/m2     Alix Coon LPN

## 2018-01-31 NOTE — PATIENT INSTRUCTIONS
Home, rest, lots of fluids, vaporizer if needed. Tessalon Perles if needed. Follow up if worse or not better next 3-5 days.

## 2018-01-31 NOTE — PROGRESS NOTES
Roc Coats is a 74 y.o. female who presents to the office today with the following:  Chief Complaint   Patient presents with    Cough     persistent cough X3 days, slight runny nose, denies congestion, ribs hurt from coughing       Allergies   Allergen Reactions    Ace Inhibitors Cough    Lipitor [Atorvastatin] Myalgia       Current Outpatient Prescriptions   Medication Sig    benzonatate (TESSALON) 200 mg capsule Take 1 Cap by mouth three (3) times daily as needed for Cough.  Cholecalciferol, Vitamin D3, 50,000 unit cap Take one twice a week  Indications: Vitamin D Deficiency    FLUoxetine (PROZAC) 20 mg capsule TAKE ONE CAPSULE BY MOUTH EVERY DAY    fenofibrate nanocrystallized (TRICOR) 145 mg tablet TAKE 1 TABLET BY MOUTH EVERY DAY    rosuvastatin (CRESTOR) 40 mg tablet TAKE 1 TABLET BY MOUTH NIGHTLY    losartan-hydroCHLOROthiazide (HYZAAR) 100-25 mg per tablet TAKE 1 TABLET BY MOUTH EVERY DAY    acetaminophen (TYLENOL) 500 mg tablet Take  by mouth every six (6) hours as needed for Pain. No current facility-administered medications for this visit.         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    Depression     Endocrine disorder     not current      Hyperlipidemia     Menopause     Metabolic syndrome     Vitamin D deficiency        Past Surgical History:   Procedure Laterality Date    BREAST SURGERY PROCEDURE UNLISTED  8/2010    Breast BX; negative    HX BREAST BIOPSY Left     benign    HX COLONOSCOPY      nl, done in [place]    HX GYN  1996    MARY ANN & BSO     HX HYSTERECTOMY      age 52       History   Smoking Status    Never Smoker   Smokeless Tobacco    Never Used       Family History   Problem Relation Age of Onset    Hypertension Brother     Elevated Lipids Brother     Breast Cancer Maternal Aunt          History of Present Illness:  Patient here for evaluation URI complaints    Patient states over the last 3 days she has had URI complaints with cough productive of scant phlegm. She has had no wheezing chest pain or shortness of breath. She has a bit of a stuffy nose but no sinus complaints or otalgia. She has had no fever no chills no myalgias. Patient did take over-the-counter cold medications without much help. She took some Tessalon Perles from an old prescription which did. Patient is feeling better today. Her children were concerned and wanted her tested for flu. Patient does not get flu shots. She does not smoke      Review of Systems:    Review of systems negative except as noted above      Physical Exam:  Visit Vitals    /62 (BP 1 Location: Right arm, BP Patient Position: Sitting)    Pulse 67    Temp 98.5 °F (36.9 °C) (Oral)    Resp 16    Wt 164 lb 3.2 oz (74.5 kg)    SpO2 96%    BMI 29.09 kg/m2     Vitals:    01/31/18 1354   BP: 124/62   BP 1 Location: Right arm   BP Patient Position: Sitting   Pulse: 67   Resp: 16   Temp: 98.5 °F (36.9 °C)   TempSrc: Oral   SpO2: 96%   Weight: 164 lb 3.2 oz (74.5 kg)     Patient no acute distress vitals as above  Head was normocephalic  External ears were normal.  Ear canals normal.  TMs were clear  Nose external nose normal.  No lesions. Minimal clear congestion      OP Mucosa normal.  Pharynx normal.  No erythema or exudate. Structures midline  Neck no nodes no masses  Chest is clear no wheezes rhonchi or rales. Good air exchange  Cor regular rate and rhythm no murmurs  Rapid flu was negative  Assessment/Plan:  1. Viral upper respiratory tract infection history and exam consistent with viral URI. Symptoms improving. Will treat symptomatically    - benzonatate (TESSALON) 200 mg capsule; Take 1 Cap by mouth three (3) times daily as needed for Cough. Dispense: 15 Cap; Refill: 0  - AMB POC BINAX NOW INFLUENZA TEST    Patient Instructions   Home, rest, lots of fluids, vaporizer if needed. Tessalon Perles if needed.   Follow up if worse or not better next 3-5 days.          Continue current therapy plan except for indicated above. Verbal and written instructions (see AVS) provided.  Patient expresses understanding of diagnosis and treatment plan. Follow-up Disposition:  Return if symptoms worsen or fail to improve. Sriram Jacob.  Eugenio Butcher MD

## 2018-02-07 DIAGNOSIS — J06.9 VIRAL UPPER RESPIRATORY TRACT INFECTION: ICD-10-CM

## 2018-02-07 RX ORDER — AZITHROMYCIN 250 MG/1
TABLET, FILM COATED ORAL
Qty: 6 TAB | Refills: 0 | Status: SHIPPED | OUTPATIENT
Start: 2018-02-07 | End: 2018-02-12

## 2018-02-07 RX ORDER — BENZONATATE 200 MG/1
CAPSULE ORAL
Qty: 15 CAP | Refills: 0 | Status: SHIPPED | OUTPATIENT
Start: 2018-02-07 | End: 2018-12-12

## 2018-02-07 NOTE — TELEPHONE ENCOUNTER
Patient was seen last week for flulike symptoms and a cough. Rapid flu test was negative. We have been treating her symptomatically. She is better at this point. No fever. She still has a dry cough which is improved slightly but has persistent. No chest pain no shortness of breath. She still feeling tired. Given her persistent symptoms I am going to call in a course of Zithromax for her and I have refilled her Tessalon Perles.   She is can a follow-up if not continuing to improve

## 2018-03-23 ENCOUNTER — TELEPHONE (OUTPATIENT)
Dept: FAMILY MEDICINE CLINIC | Age: 73
End: 2018-03-23

## 2018-03-26 ENCOUNTER — TELEPHONE (OUTPATIENT)
Dept: FAMILY MEDICINE CLINIC | Age: 73
End: 2018-03-26

## 2018-04-02 LAB — HEMOCCULT STL QL IA: NEGATIVE

## 2018-04-03 NOTE — PROGRESS NOTES
I have called and talked to this patient, advised of this lab result. Patient verbalizes understanding.

## 2018-04-12 ENCOUNTER — OFFICE VISIT (OUTPATIENT)
Dept: SURGERY | Age: 73
End: 2018-04-12

## 2018-04-12 VITALS
BODY MASS INDEX: 31.38 KG/M2 | DIASTOLIC BLOOD PRESSURE: 69 MMHG | HEART RATE: 65 BPM | WEIGHT: 166.2 LBS | SYSTOLIC BLOOD PRESSURE: 112 MMHG | HEIGHT: 61 IN

## 2018-04-12 DIAGNOSIS — Z12.11 SCREEN FOR COLON CANCER: ICD-10-CM

## 2018-04-12 DIAGNOSIS — R13.19 ESOPHAGEAL DYSPHAGIA: Primary | ICD-10-CM

## 2018-04-12 RX ORDER — POLYETHYLENE GLYCOL 3350, SODIUM CHLORIDE, SODIUM BICARBONATE, POTASSIUM CHLORIDE 420; 11.2; 5.72; 1.48 G/4L; G/4L; G/4L; G/4L
POWDER, FOR SOLUTION ORAL
Qty: 1 BOTTLE | Refills: 0 | Status: SHIPPED | OUTPATIENT
Start: 2018-04-12 | End: 2018-04-23

## 2018-04-12 RX ORDER — BISACODYL 5 MG
TABLET, DELAYED RELEASE (ENTERIC COATED) ORAL
Qty: 1 TAB | Refills: 0 | Status: SHIPPED | OUTPATIENT
Start: 2018-04-12 | End: 2018-04-23

## 2018-04-12 RX ORDER — SODIUM CHLORIDE, SODIUM LACTATE, POTASSIUM CHLORIDE, CALCIUM CHLORIDE 600; 310; 30; 20 MG/100ML; MG/100ML; MG/100ML; MG/100ML
200 INJECTION, SOLUTION INTRAVENOUS CONTINUOUS
Status: CANCELLED | OUTPATIENT
Start: 2018-04-12 | End: 2018-04-13

## 2018-04-12 NOTE — PROGRESS NOTES
[address] [facility], [facility] [place]  Phone: [phone] Fax: [phone]                                              OFFICE NOTE    NAME: Nino Ribeiro  : 1945    Chief Complaint:   Chief Complaint   Patient presents with    Dysphagia       History: Nino Ribeiro is a 74 y.o. WHITE OR  female referred for dysphagia. She was recently seen in the ER for near obstruction of food bolus. It dislodged spontaneously. She has had problems with food getting stuck since last summer. She was not on any meds till the ER placed her on Prilosec. She has never had an EGD. She did occasionally have to vomit to get it out. She also needs a screening colonoscopy. This is  the patient's second colonoscopy. The previous one was normal.  The last colonoscopy was in . The bowel movements are regular and brown. She does not use any meds on a regular basis for her bowels. She denies any blood in stools or hemorrhoidal disease. Family history is negative for colon cancer except for 2 maternal aunts.           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    Depression     Endocrine disorder     not current      Hyperlipidemia     Hypertension     Menopause     Metabolic syndrome     Vitamin D deficiency      Past Surgical History:   Procedure Laterality Date    BREAST SURGERY PROCEDURE UNLISTED  2010    Breast BX; negative    HX BREAST BIOPSY Left     benign    HX COLONOSCOPY      nl, done in [place]    HX GYN      MARY ANN & BSO     HX HYSTERECTOMY      age 52        Current Outpatient Prescriptions   Medication Sig Dispense Refill    peg-electrolyte soln (GAVILYTE-N) 420 gram solution Take as directed  Indications: BOWEL EVACUATION 1 Bottle 0    bisacodyl (DULCOLAX, BISACODYL,) 5 mg EC tablet Take as directed  Indications: BOWEL EVACUATION 1 Tab 0    Cholecalciferol, Vitamin D3, 50,000 unit cap Take one twice a week  Indications: Vitamin D Deficiency 24 Cap 1    FLUoxetine (PROZAC) 20 mg capsule TAKE ONE CAPSULE BY MOUTH EVERY DAY 90 Cap 1    fenofibrate nanocrystallized (TRICOR) 145 mg tablet TAKE 1 TABLET BY MOUTH EVERY DAY 90 Tab 1    rosuvastatin (CRESTOR) 40 mg tablet TAKE 1 TABLET BY MOUTH NIGHTLY 90 Tab 1    losartan-hydroCHLOROthiazide (HYZAAR) 100-25 mg per tablet TAKE 1 TABLET BY MOUTH EVERY DAY 90 Tab 1    acetaminophen (TYLENOL) 500 mg tablet Take  by mouth every six (6) hours as needed for Pain.  benzonatate (TESSALON) 200 mg capsule SWALLOW WHOLE, DON'T CHEW OR CRUSH! TAKE ONE CAPSULE BY MOUTH EVERY 8HOURS AS NEEDED FOR COUGH 15 Cap 0     Allergies   Allergen Reactions    Ace Inhibitors Cough    Lipitor [Atorvastatin] Myalgia     Social History     Social History    Marital status: SINGLE     Spouse name: N/A    Number of children: N/A    Years of education: N/A     Occupational History    Not on file.      Social History Main Topics    Smoking status: Never Smoker    Smokeless tobacco: Never Used    Alcohol use 0.0 oz/week     0 Standard drinks or equivalent per week      Comment: social    Drug use: No    Sexual activity: No     Other Topics Concern     Service No    Blood Transfusions No    Caffeine Concern No    Occupational Exposure No    Hobby Hazards No    Sleep Concern No    Stress Concern No    Weight Concern No    Special Diet No    Back Care No    Exercise Yes     goes to [employer] No    Seat Belt Yes    Self-Exams Yes     Social History Narrative     Family History   Problem Relation Age of Onset    Hypertension Brother     Elevated Lipids Brother     Breast Cancer Maternal Aunt     No Known Problems Mother        Patient Active Problem List   Diagnosis Code    Depression F32.9    Hyperlipidemia E78.5    Arthritis V48.71    Metabolic syndrome R71.76    Vitamin D deficiency E55.9    Advance directive discussed with patient Z70.80    Essential hypertension with goal blood pressure less than 140/90 I10    Mixed hyperlipidemia E78.2    Elevated glucose R73.09       Review of Systems:  Review of Systems   Constitutional: Negative for chills, fever, malaise/fatigue and weight loss. HENT: Negative for congestion, ear discharge, ear pain, hearing loss, nosebleeds, sore throat and tinnitus. Eyes: Negative for blurred vision, double vision, pain, discharge and redness. Respiratory: Negative for cough, sputum production, shortness of breath and wheezing. Cardiovascular: Positive for chest pain. Negative for palpitations and leg swelling. Gastrointestinal: Positive for vomiting. Negative for abdominal pain, blood in stool, constipation, diarrhea, heartburn, melena and nausea. Genitourinary: Negative for frequency, hematuria and urgency. Musculoskeletal: Negative for back pain, joint pain and neck pain. Skin: Negative for itching and rash. Neurological: Negative for dizziness, tremors, focal weakness, seizures, weakness and headaches. Endo/Heme/Allergies: Does not bruise/bleed easily. Psychiatric/Behavioral: Positive for depression. Negative for memory loss. The patient is not nervous/anxious and does not have insomnia. Physical Exam:  Visit Vitals    /69 (BP 1 Location: Left arm, BP Patient Position: Sitting)    Pulse 65    Ht 5' 1\" (1.549 m)    Wt 166 lb 3.2 oz (75.4 kg)    BMI 31.4 kg/m2       Physical Exam   Constitutional: She is oriented to person, place, and time. She appears well-developed and well-nourished. HENT:   Head: Normocephalic and atraumatic. Right Ear: External ear normal.   Left Ear: External ear normal.   Nose: Nose normal.   Mouth/Throat: Oropharynx is clear and moist. No oropharyngeal exudate. Eyes: Conjunctivae and EOM are normal. Pupils are equal, round, and reactive to light. Right eye exhibits no discharge. Left eye exhibits no discharge. No scleral icterus. Neck: Neck supple. No JVD present.  No tracheal deviation present. No thyromegaly present. Cardiovascular: Normal rate, regular rhythm and normal heart sounds. Exam reveals no gallop and no friction rub. No murmur heard. Pulmonary/Chest: Effort normal and breath sounds normal. She has no wheezes. She has no rales. Abdominal: Soft. Bowel sounds are normal. She exhibits no distension and no mass. There is no tenderness. There is no rebound. Musculoskeletal: Normal range of motion. Lymphadenopathy:     She has no cervical adenopathy. Neurological: She is alert and oriented to person, place, and time. Skin: Skin is warm and dry. No rash noted. No erythema. Psychiatric: She has a normal mood and affect. Her behavior is normal. Judgment and thought content normal.       Impression:  Dysphagia  Need for screening colonoscopy    Plan:  EGD/colonoscopy on 1/65/75  Risks and complications were explained to patient and they voiced understanding.     Ana Lee M.D.

## 2018-04-23 PROBLEM — K57.30 DIVERTICULA OF COLON: Status: ACTIVE | Noted: 2018-04-23

## 2018-04-23 PROBLEM — K29.30 CHRONIC SUPERFICIAL GASTRITIS WITHOUT BLEEDING: Status: ACTIVE | Noted: 2018-04-23

## 2018-04-23 PROBLEM — Z12.11 SCREEN FOR COLON CANCER: Status: RESOLVED | Noted: 2018-04-23 | Resolved: 2018-04-23

## 2018-04-23 PROBLEM — R13.10 DYSPHAGIA: Status: ACTIVE | Noted: 2018-04-23

## 2018-04-23 PROBLEM — R13.10 DYSPHAGIA: Status: RESOLVED | Noted: 2018-04-23 | Resolved: 2018-04-23

## 2018-04-23 PROBLEM — K20.90 ESOPHAGITIS DETERMINED BY ENDOSCOPY: Status: ACTIVE | Noted: 2018-04-23

## 2018-04-23 PROBLEM — Z12.11 SCREEN FOR COLON CANCER: Status: ACTIVE | Noted: 2018-04-23

## 2018-05-01 ENCOUNTER — OFFICE VISIT (OUTPATIENT)
Dept: SURGERY | Age: 73
End: 2018-05-01

## 2018-05-01 VITALS
DIASTOLIC BLOOD PRESSURE: 61 MMHG | BODY MASS INDEX: 28.17 KG/M2 | HEIGHT: 64 IN | SYSTOLIC BLOOD PRESSURE: 103 MMHG | WEIGHT: 165 LBS | HEART RATE: 76 BPM

## 2018-05-01 DIAGNOSIS — K29.90 GASTRITIS AND DUODENITIS: Primary | ICD-10-CM

## 2018-05-01 DIAGNOSIS — K57.30 DIVERTICULA OF COLON: ICD-10-CM

## 2018-05-01 DIAGNOSIS — D12.3 ADENOMATOUS POLYP OF TRANSVERSE COLON: ICD-10-CM

## 2018-05-03 ENCOUNTER — OFFICE VISIT (OUTPATIENT)
Dept: FAMILY MEDICINE CLINIC | Age: 73
End: 2018-05-03

## 2018-05-03 VITALS
HEIGHT: 64 IN | BODY MASS INDEX: 28 KG/M2 | OXYGEN SATURATION: 98 % | RESPIRATION RATE: 18 BRPM | WEIGHT: 164 LBS | DIASTOLIC BLOOD PRESSURE: 69 MMHG | TEMPERATURE: 96.9 F | HEART RATE: 62 BPM | SYSTOLIC BLOOD PRESSURE: 116 MMHG

## 2018-05-03 DIAGNOSIS — Z12.39 BREAST CANCER SCREENING: ICD-10-CM

## 2018-05-03 DIAGNOSIS — Z00.00 MEDICARE ANNUAL WELLNESS VISIT, SUBSEQUENT: Primary | ICD-10-CM

## 2018-05-03 NOTE — PROGRESS NOTES
Jesse Brock is a 74 y.o. female and presents for annual Medicare Wellness Visit. Problem List: Reviewed with patient and discussed risk factors. Patient Active Problem List   Diagnosis Code    Depression F32.9    Hyperlipidemia E78.5    Arthritis U22.89    Metabolic syndrome Q29.44    Vitamin D deficiency E55.9    Advance directive discussed with patient Z70.80    Essential hypertension with goal blood pressure less than 140/90 I10    Mixed hyperlipidemia E78.2    Elevated glucose R73.09    Chronic superficial gastritis without bleeding K29.30    Esophagitis determined by endoscopy K20.9    Diverticula of colon K57.30       Current medical providers:  Patient Care Team:  Siva Urbina MD as PCP - General (Family Practice)  Andriy Lock MD (Surgery)    PSH: Reviewed with patient  Past Surgical History:   Procedure Laterality Date    BREAST SURGERY PROCEDURE UNLISTED  8/2010    Breast BX; negative    COLONOSCOPY N/A 4/23/2018    COLONOSCOPY POSSIBLE DILATION performed by [facility] Lock [facility] at [facility] HX BREAST BIOPSY Left     benign    HX COLONOSCOPY      nl, done in [place]    HX COLONOSCOPY  2018    polyp    HX ENDOSCOPY  2018    HX GYN  1996    [facility] & BSO     HX HYSTERECTOMY      age 52        SH: Reviewed with patient  Social History   Substance Use Topics    Smoking status: Never Smoker    Smokeless tobacco: Never Used    Alcohol use 0.0 oz/week     0 Standard drinks or equivalent per week      Comment: social       FH: Reviewed with patient  Family History   Problem Relation Age of Onset    Hypertension Brother     Elevated Lipids Brother     Breast Cancer Maternal Aunt     No Known Problems Mother        Medications/Allergies: Reviewed with patient  Current Outpatient Prescriptions on File Prior to Visit   Medication Sig Dispense Refill    omeprazole (PRILOSEC) 20 mg capsule Take 20 mg by mouth daily.       benzonatate (TESSALON) 200 mg capsule SWALLOW WHOLE, DON'T CHEW OR CRUSH! TAKE ONE CAPSULE BY MOUTH EVERY 8HOURS AS NEEDED FOR COUGH 15 Cap 0    Cholecalciferol, Vitamin D3, 50,000 unit cap Take one twice a week  Indications: Vitamin D Deficiency 24 Cap 1    FLUoxetine (PROZAC) 20 mg capsule TAKE ONE CAPSULE BY MOUTH EVERY DAY 90 Cap 1    fenofibrate nanocrystallized (TRICOR) 145 mg tablet TAKE 1 TABLET BY MOUTH EVERY DAY 90 Tab 1    rosuvastatin (CRESTOR) 40 mg tablet TAKE 1 TABLET BY MOUTH NIGHTLY 90 Tab 1    losartan-hydroCHLOROthiazide (HYZAAR) 100-25 mg per tablet TAKE 1 TABLET BY MOUTH EVERY DAY 90 Tab 1    acetaminophen (TYLENOL) 500 mg tablet Take  by mouth every six (6) hours as needed for Pain. No current facility-administered medications on file prior to visit. Allergies   Allergen Reactions    Ace Inhibitors Cough    Lipitor [Atorvastatin] Myalgia       Objective: There were no vitals taken for this visit. There is no height or weight on file to calculate BMI. Assessment of cognitive impairment: Alert and oriented x 3    Depression Screen:   PHQ over the last two weeks 5/3/2018   Little interest or pleasure in doing things Not at all   Feeling down, depressed or hopeless Not at all   Total Score PHQ 2 0       Fall Risk Assessment:    Fall Risk Assessment, last 12 mths 5/3/2018   Able to walk? Yes   Fall in past 12 months? No       Functional Ability:   Does the patient exhibit a steady gait? yes   How long did it take the patient to get up and walk from a sitting position? 2sec   Is the patient self reliant?  (ie can do own laundry, meals, household chores)  yes     Does the patient handle his/her own medications? yes     Does the patient handle his/her own money? yes     Is the patients home safe (ie good lighting, handrails on stairs and bath, etc.)? yes     Did you notice or did patient express any hearing difficulties?    no     Did you notice or did patient express any vision difficulties?   no     Were distance and reading eye charts used? no       Advance Care Planning:   Patient was offered the opportunity to discuss advance care planning:  no     Does patient have an Advance Directive:  yes   If no, did you provide information on Caring Connections?  no       Plan:      No orders of the defined types were placed in this encounter. Health Maintenance   Topic Date Due    GLAUCOMA SCREENING Q2Y  04/15/2018    MEDICARE YEARLY EXAM  04/27/2018    BREAST CANCER SCRN MAMMOGRAM  05/09/2018    Influenza Age 9 to Adult  08/01/2018    FOBT Q 1 YEAR AGE 50-75  03/30/2019    DTaP/Tdap/Td series (2 - Td) 12/01/2027    Bone Densitometry (Dexa) Screening  Completed    ZOSTER VACCINE AGE 60>  Addressed    Pneumococcal 65+ Low/Medium Risk  Completed       *Patient verbalized understanding and agreement with the plan. A copy of the After Visit Summary with personalized health plan was given to the patient today.

## 2018-05-03 NOTE — ACP (ADVANCE CARE PLANNING)
Advance Care Planning (ACP) Provider Conversation Snapshot    Date of ACP Conversation: 05/03/18  Persons included in Conversation:  patient  Length of ACP Conversation in minutes:  <16 minutes (Non-Billable)    Has ACP on file and it is up to date

## 2018-05-03 NOTE — PATIENT INSTRUCTIONS
Schedule of Personalized Health Plan  (Provide Copy to Patient)  The best way to stay healthy is to live a healthy lifestyle. A healthy lifestyle includes regular exercise, eating a well-balanced diet, keeping a healthy weight and not smoking. Regular physical exams and screening tests are another important way to take care of yourself. Preventive exams provided by health care providers can find health problems early when treatment works best and can keep you from getting certain diseases or illnesses. Preventive services include exams, lab tests, screenings, shots, monitoring and information to help you take care of your own health. All people over 65 should have a pneumonia shot. Pneumonia shots are usually only needed once in a lifetime unless your doctor decides differently. All people over 65 should have a yearly flu shot. People over 65 are at medium to high risk for Hepatitis B. Three shots are needed for complete protection. In addition to your physical exam, some screening tests are recommended:    Bone mass measurement (dexa scan) is recommended every two years  Diabetes Mellitus screening is recommended every year. Glaucoma is an eye disease caused by high pressure in the eye. An eye exam is recommended every year. Cardiovascular screening tests that check your cholesterol and other blood fat (lipid) levels are recommended every five years. Colorectal Cancer screening tests help to find pre-cancerous polyps (growths in the colon) so they can be removed before they turn into cancer. Tests ordered for screening depend on your personal and family history risk factors.     Screening for Breast Cancer is recommended yearly with a mammogram.    Screening for Cervical Cancer is recommended every two years (annually for certain risk factors, such as previous history of STD or abnormal PAP in past 7 years), with a Pelvic Exam with PAP    Here is a list of your current Health Maintenance items with a due date:  Health Maintenance   Topic Date Due    GLAUCOMA SCREENING Q2Y  04/15/2018    MEDICARE YEARLY EXAM  04/27/2018   done 5/3/18    BREAST CANCER SCRN MAMMOGRAM  05/09/2018    Influenza Age 9 to Adult  08/01/2018    FOBT Q 1 YEAR AGE 50-75  03/30/2019    DTaP/Tdap/Td series (2 - Td) 12/01/2027    Bone Densitometry (Dexa) Screening  Completed    ZOSTER VACCINE AGE 60>  Addressed    Pneumococcal 65+ Low/Medium Risk  Completed

## 2018-05-03 NOTE — PROGRESS NOTES
HISTORY OF PRESENT ILLNESS  Ivett Tang is a 74 y.o. female. 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    Depression     Endocrine disorder     not current      Hyperlipidemia     Hypertension     Menopause     Metabolic syndrome     Vitamin D deficiency      Past Surgical History:   Procedure Laterality Date    BREAST SURGERY PROCEDURE UNLISTED  8/2010    Breast BX; negative    COLONOSCOPY N/A 4/23/2018    COLONOSCOPY POSSIBLE DILATION performed by Zarina Mistry MD at [facility] [address]27 HX BREAST BIOPSY Left     benign    HX COLONOSCOPY      nl, done in [place]    HX COLONOSCOPY  2018    polyp    HX ENDOSCOPY  2018    HX GYN  1996    [facility] & B[facility]     HX HYSTERECTOMY      age 52       Current Outpatient Prescriptions   Medication Sig Dispense Refill    omeprazole (PRILOSEC) 20 mg capsule Take 20 mg by mouth daily.  benzonatate (TESSALON) 200 mg capsule SWALLOW WHOLE, DON'T CHEW OR CRUSH! TAKE ONE CAPSULE BY MOUTH EVERY 8HOURS AS NEEDED FOR COUGH 15 Cap 0    Cholecalciferol, Vitamin D3, 50,000 unit cap Take one twice a week  Indications: Vitamin D Deficiency 24 Cap 1    FLUoxetine (PROZAC) 20 mg capsule TAKE ONE CAPSULE BY MOUTH EVERY DAY 90 Cap 1    fenofibrate nanocrystallized (TRICOR) 145 mg tablet TAKE 1 TABLET BY MOUTH EVERY DAY 90 Tab 1    rosuvastatin (CRESTOR) 40 mg tablet TAKE 1 TABLET BY MOUTH NIGHTLY 90 Tab 1    losartan-hydroCHLOROthiazide (HYZAAR) 100-25 mg per tablet TAKE 1 TABLET BY MOUTH EVERY DAY 90 Tab 1    acetaminophen (TYLENOL) 500 mg tablet Take  by mouth every six (6) hours as needed for Pain.        Allergies   Allergen Reactions    Ace Inhibitors Cough    Lipitor [Atorvastatin] Myalgia     Visit Vitals    /69 (BP 1 Location: Left arm, BP Patient Position: Sitting)    Pulse 62    Temp 96.9 °F (36.1 °C) (Temporal)    Resp 18    Ht 5' 4\" (1.626 m)    Wt 164 lb (74.4 kg)    SpO2 98%    BMI 28.15 kg/m2     Physical Exam   Constitutional: She is oriented to person, place, and time. She appears well-developed and well-nourished. No distress. HENT:   Head: Normocephalic and atraumatic. Eyes: Conjunctivae and EOM are normal.   Pulmonary/Chest: Effort normal.   Neurological: She is alert and oriented to person, place, and time. Psychiatric: She has a normal mood and affect. Nursing note and vitals reviewed. ASSESSMENT and PLAN    ICD-10-CM ICD-9-CM    1. Medicare annual wellness visit, subsequent Z00.00 V70.0    2.  Breast cancer screening Z12.31 V76.10 ANTONIETTA MAMMO BI SCREENING INCL CAD

## 2018-06-12 ENCOUNTER — OFFICE VISIT (OUTPATIENT)
Dept: FAMILY MEDICINE CLINIC | Age: 73
End: 2018-06-12

## 2018-06-12 VITALS
HEIGHT: 64 IN | HEART RATE: 65 BPM | DIASTOLIC BLOOD PRESSURE: 65 MMHG | OXYGEN SATURATION: 97 % | RESPIRATION RATE: 14 BRPM | SYSTOLIC BLOOD PRESSURE: 107 MMHG | BODY MASS INDEX: 28 KG/M2 | WEIGHT: 164 LBS | TEMPERATURE: 98.3 F

## 2018-06-12 DIAGNOSIS — S20.222A CONTUSION OF LEFT BACK WALL OF THORAX, INITIAL ENCOUNTER: ICD-10-CM

## 2018-06-12 DIAGNOSIS — E78.2 MIXED HYPERLIPIDEMIA: ICD-10-CM

## 2018-06-12 DIAGNOSIS — E66.3 OVERWEIGHT (BMI 25.0-29.9): ICD-10-CM

## 2018-06-12 DIAGNOSIS — E55.9 VITAMIN D DEFICIENCY: ICD-10-CM

## 2018-06-12 DIAGNOSIS — I10 ESSENTIAL HYPERTENSION WITH GOAL BLOOD PRESSURE LESS THAN 140/90: ICD-10-CM

## 2018-06-12 DIAGNOSIS — R73.09 ELEVATED GLUCOSE: Primary | ICD-10-CM

## 2018-06-12 LAB — HBA1C MFR BLD HPLC: 5.6 %

## 2018-06-12 RX ORDER — LOSARTAN POTASSIUM AND HYDROCHLOROTHIAZIDE 12.5; 1 MG/1; MG/1
1 TABLET ORAL DAILY
Qty: 90 TAB | Refills: 1 | Status: SHIPPED | OUTPATIENT
Start: 2018-06-12 | End: 2018-12-12 | Stop reason: DRUGHIGH

## 2018-06-12 RX ORDER — TRAMADOL HYDROCHLORIDE 50 MG/1
50 TABLET ORAL
Qty: 15 TAB | Refills: 0 | Status: SHIPPED | OUTPATIENT
Start: 2018-06-12 | End: 2020-01-13

## 2018-06-12 NOTE — PROGRESS NOTES
Chief Complaint   Patient presents with    Cholesterol Problem     pt seen 1 mo ago for wellness, here today for FLW and discuss chronic problems     Health Maintenance Due   Topic Date Due    GLAUCOMA SCREENING Q2Y  04/15/2018     Visit Vitals    /65 (BP 1 Location: Left arm, BP Patient Position: Sitting)    Pulse 65    Temp 98.3 °F (36.8 °C) (Oral)    Resp 14    Ht 5' 4\" (1.626 m)    Wt 164 lb (74.4 kg)    SpO2 97%    BMI 28.15 kg/m2     1. Have you been to the ER, urgent care clinic since your last visit? Hospitalized since your last visit? No    2. Have you seen or consulted any other health care providers outside of the [address] [place] [address] since your last visit? Include any pap smears or colon screening.  No    Nahum Stack LPN

## 2018-06-12 NOTE — PROGRESS NOTES
HISTORY OF PRESENT ILLNESS  Priscilla Arnett is a 74 y.o. female. Chief Complaint   Patient presents with    Cholesterol Problem     pt seen 1 mo ago for wellness, here today for FLW and discuss chronic problems       HPI  Lost balance and fell last week and fell on back  Still sore,   occ light headed when getting up  Tried Ibuprofen  600 mg  and rubs, icy hot    HTN  On Hyzaar 100/25  No BP checks at home  On review BP has been on low side    Hyperlipidemia  Fasting for BW  On Crestor and Tricor    Elevated Glucose  Watching diet  No exercise routine  But active    Vit D def  Not taking Vit D twice a week all the time  Taking it about once a week      Review of Systems   Constitutional: Negative for weight loss. Respiratory: Negative for cough and shortness of breath. Cardiovascular: Negative for chest pain. Gastrointestinal: Negative. Genitourinary: Negative. Negative for hematuria. Musculoskeletal: Positive for back pain and falls. Neurological: Positive for tingling (in right hand). Negative for focal weakness. Psychiatric/Behavioral: Positive for depression (taking car of  d/t hip surgery). The patient is nervous/anxious.       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    Depression     Endocrine disorder     not current      Hyperlipidemia     Hypertension     Menopause     Metabolic syndrome     Vitamin D deficiency        Past Surgical History:   Procedure Laterality Date    BREAST SURGERY PROCEDURE UNLISTED  8/2010    Breast BX; negative    COLONOSCOPY N/A 4/23/2018    [facility] HX BREAST BIOPSY Left     benign    HX COLONOSCOPY      nl, done in [place]    HX COLONOSCOPY  2018    polyp    HX ENDOSCOPY  2018    HX GYN  1996    [facility] & B[facility]     HX HYSTERECTOMY      age 52    HX OOPHORECTOMY         Current Outpatient Prescriptions   Medication Sig Dispense Refill    losartan-hydroCHLOROthiazide (HYZAAR) 100-12.5 mg per tablet Take 1 Tab by mouth daily. 90 Tab 1    traMADol (ULTRAM) 50 mg tablet Take 1 Tab by mouth every six (6) hours as needed for Pain. Max Daily Amount: 200 mg. Indications: Pain 15 Tab 0    omeprazole (PRILOSEC) 20 mg capsule Take 20 mg by mouth daily.  benzonatate (TESSALON) 200 mg capsule SWALLOW WHOLE, DON'T CHEW OR CRUSH! TAKE ONE CAPSULE BY MOUTH EVERY 8HOURS AS NEEDED FOR COUGH 15 Cap 0    Cholecalciferol, Vitamin D3, 50,000 unit cap Take one twice a week  Indications: Vitamin D Deficiency 24 Cap 1    FLUoxetine (PROZAC) 20 mg capsule TAKE ONE CAPSULE BY MOUTH EVERY DAY 90 Cap 1    fenofibrate nanocrystallized (TRICOR) 145 mg tablet TAKE 1 TABLET BY MOUTH EVERY DAY 90 Tab 1    rosuvastatin (CRESTOR) 40 mg tablet TAKE 1 TABLET BY MOUTH NIGHTLY 90 Tab 1    acetaminophen (TYLENOL) 500 mg tablet Take  by mouth every six (6) hours as needed for Pain. Allergies   Allergen Reactions    Ace Inhibitors Cough    Lipitor [Atorvastatin] Myalgia       Visit Vitals    /65 (BP 1 Location: Left arm, BP Patient Position: Sitting)    Pulse 65    Temp 98.3 °F (36.8 °C) (Oral)    Resp 14    Ht 5' 4\" (1.626 m)    Wt 164 lb (74.4 kg)    SpO2 97%    BMI 28.15 kg/m2       Physical Exam   Constitutional: She is oriented to person, place, and time. She appears well-developed and well-nourished. No distress. HENT:   Head: Normocephalic and atraumatic. Right Ear: External ear normal.   Left Ear: External ear normal.   Mouth/Throat: Oropharynx is clear and moist. No oropharyngeal exudate. Eyes: Conjunctivae and EOM are normal. Pupils are equal, round, and reactive to light. Cardiovascular: Normal rate and regular rhythm. Pulmonary/Chest: Effort normal and breath sounds normal.   Musculoskeletal: She exhibits no edema. No tenderness over spine, but tender left mid back, no bruises   Lymphadenopathy:     She has no cervical adenopathy. Neurological: She is alert and oriented to person, place, and time. Skin: Skin is warm and dry. Nursing note and vitals reviewed. Recent Results (from the past 12 hour(s))   AMB POC HEMOGLOBIN A1C    Collection Time: 06/12/18  8:13 AM   Result Value Ref Range    Hemoglobin A1c (POC) 5.6 %       ASSESSMENT and PLAN    ICD-10-CM ICD-9-CM    1. Elevated glucose R73.09 790.29 AMB POC HEMOGLOBIN A1C  Cont low conc sweets diet   2. Mixed hyperlipidemia E78.2 272.2 LIPID PANEL WITH LDL/HDL RATIO   3. Essential hypertension with goal blood pressure less than 140/90 Y34 982.8 METABOLIC PANEL, COMPREHENSIVE      losartan-hydroCHLOROthiazide (HYZAAR) 100-12.5 mg per tablet  Med decreased, monitor BP and RTC if over 140/90   4. Vitamin D deficiency E55.9 268.9 VITAMIN D, 25 HYDROXY   5. Contusion of left back wall of thorax, initial encounter S20.222A 922.33 traMADol (ULTRAM) 50 mg tablet  Advil and Tylenol   6. Overweight (BMI 25.0-29. 9) E66.3 278.02    Diet, exercise and weight loss discussed, goals discussed

## 2018-06-13 LAB
25(OH)D3+25(OH)D2 SERPL-MCNC: 37.8 NG/ML (ref 30–100)
ALBUMIN SERPL-MCNC: 4.8 G/DL (ref 3.5–4.8)
ALBUMIN/GLOB SERPL: 1.9 {RATIO} (ref 1.2–2.2)
ALP SERPL-CCNC: 39 IU/L (ref 39–117)
ALT SERPL-CCNC: 19 IU/L (ref 0–32)
AST SERPL-CCNC: 23 IU/L (ref 0–40)
BILIRUB SERPL-MCNC: 0.3 MG/DL (ref 0–1.2)
BUN SERPL-MCNC: 20 MG/DL (ref 8–27)
BUN/CREAT SERPL: 20 (ref 12–28)
CALCIUM SERPL-MCNC: 10.4 MG/DL (ref 8.7–10.3)
CHLORIDE SERPL-SCNC: 99 MMOL/L (ref 96–106)
CHOLEST SERPL-MCNC: 154 MG/DL (ref 100–199)
CO2 SERPL-SCNC: 22 MMOL/L (ref 20–29)
CREAT SERPL-MCNC: 1.02 MG/DL (ref 0.57–1)
GFR SERPLBLD CREATININE-BSD FMLA CKD-EPI: 54 ML/MIN/1.73
GFR SERPLBLD CREATININE-BSD FMLA CKD-EPI: 62 ML/MIN/1.73
GLOBULIN SER CALC-MCNC: 2.5 G/DL (ref 1.5–4.5)
GLUCOSE SERPL-MCNC: 97 MG/DL (ref 65–99)
HDLC SERPL-MCNC: 66 MG/DL
INTERPRETATION, 910389: NORMAL
INTERPRETATION: NORMAL
LDLC SERPL CALC-MCNC: 66 MG/DL (ref 0–99)
LDLC/HDLC SERPL: 1 RATIO (ref 0–3.2)
PDF IMAGE, 910387: NORMAL
POTASSIUM SERPL-SCNC: 4.3 MMOL/L (ref 3.5–5.2)
PROT SERPL-MCNC: 7.3 G/DL (ref 6–8.5)
SODIUM SERPL-SCNC: 140 MMOL/L (ref 134–144)
TRIGL SERPL-MCNC: 111 MG/DL (ref 0–149)
VLDLC SERPL CALC-MCNC: 22 MG/DL (ref 5–40)

## 2018-06-14 NOTE — PROGRESS NOTES
Call pt, the Chol is good  The sugar and liver tests are normal  The kidney tests are borderline up as well as the Calcium, we will monitor  The Vit D is normal now,   Cont current meds and recheck in 6 mo

## 2018-09-12 RX ORDER — LOSARTAN POTASSIUM AND HYDROCHLOROTHIAZIDE 25; 100 MG/1; MG/1
TABLET ORAL
Qty: 90 TAB | Refills: 0 | Status: SHIPPED | OUTPATIENT
Start: 2018-09-12 | End: 2019-01-16 | Stop reason: SDUPTHER

## 2018-09-12 NOTE — TELEPHONE ENCOUNTER
Patient requesting to  prescription first thing in the morning when the pharmacy opens.   (Actually wants the prescription but knows Dr. Matti Mcconnell is not in the office)

## 2019-01-16 DIAGNOSIS — F32.A DEPRESSION, UNSPECIFIED DEPRESSION TYPE: ICD-10-CM

## 2019-01-17 RX ORDER — FLUOXETINE HYDROCHLORIDE 20 MG/1
CAPSULE ORAL
Qty: 90 CAP | Refills: 0 | Status: SHIPPED | OUTPATIENT
Start: 2019-01-17 | End: 2019-04-12 | Stop reason: SDUPTHER

## 2019-01-17 RX ORDER — LOSARTAN POTASSIUM AND HYDROCHLOROTHIAZIDE 25; 100 MG/1; MG/1
TABLET ORAL
Qty: 90 TAB | Refills: 0 | Status: SHIPPED | OUTPATIENT
Start: 2019-01-17 | End: 2019-04-24 | Stop reason: SDUPTHER

## 2019-04-12 DIAGNOSIS — F32.A DEPRESSION, UNSPECIFIED DEPRESSION TYPE: ICD-10-CM

## 2019-04-14 RX ORDER — FLUOXETINE HYDROCHLORIDE 20 MG/1
CAPSULE ORAL
Qty: 90 CAP | Refills: 0 | Status: SHIPPED | OUTPATIENT
Start: 2019-04-14 | End: 2019-06-11 | Stop reason: SDUPTHER

## 2020-02-20 NOTE — ADDENDUM NOTE
Addended by: Kennedy Diaz on: 4/26/2017 10:59 AM     Modules accepted: Orders
Addended by: Miri Melgar on: 4/27/2017 01:02 PM     Modules accepted: Orders
july 2019

## 2020-12-28 ENCOUNTER — HOSPITAL ENCOUNTER (OUTPATIENT)
Dept: VASCULAR SURGERY | Age: 75
Discharge: HOME | End: 2020-12-28
Attending: PHYSICIAN ASSISTANT
Payer: MEDICARE

## 2020-12-28 DIAGNOSIS — I99.8 ASYMMETRIC BLOOD PRESSURES: ICD-10-CM

## 2020-12-28 DIAGNOSIS — R09.89 DIMINISHED PULSE IN UPPER EXTREMITY: ICD-10-CM

## 2020-12-28 PROCEDURE — 93923 UPR/LXTR ART STDY 3+ LVLS: CPT

## 2020-12-29 LAB
LEFT ARM BP: 146 MMHG
RIGHT ARM BP: 187 MMHG

## 2021-01-05 NOTE — PROGRESS NOTES
Please notify pt only mild findings in left upper arm and remaining wnl. Please keep f/u apt this month and we can discuss further steps/tx of BP / cholesterol.

## 2021-03-11 ENCOUNTER — OFFICE VISIT (OUTPATIENT)
Dept: FAMILY MEDICINE CLINIC | Age: 76
End: 2021-03-11
Payer: MEDICARE

## 2021-03-11 VITALS
OXYGEN SATURATION: 98 % | RESPIRATION RATE: 18 BRPM | SYSTOLIC BLOOD PRESSURE: 130 MMHG | HEART RATE: 70 BPM | BODY MASS INDEX: 26.05 KG/M2 | TEMPERATURE: 98.3 F | WEIGHT: 147 LBS | HEIGHT: 63 IN | DIASTOLIC BLOOD PRESSURE: 70 MMHG

## 2021-03-11 DIAGNOSIS — Z01.818 PRE-OPERATIVE CLEARANCE: Primary | ICD-10-CM

## 2021-03-11 DIAGNOSIS — R94.31 ABNORMAL EKG: ICD-10-CM

## 2021-03-11 PROCEDURE — G9717 DOC PT DX DEP/BP F/U NT REQ: HCPCS | Performed by: PHYSICIAN ASSISTANT

## 2021-03-11 PROCEDURE — 93000 ELECTROCARDIOGRAM COMPLETE: CPT | Performed by: PHYSICIAN ASSISTANT

## 2021-03-11 PROCEDURE — G8536 NO DOC ELDER MAL SCRN: HCPCS | Performed by: PHYSICIAN ASSISTANT

## 2021-03-11 PROCEDURE — 99213 OFFICE O/P EST LOW 20 MIN: CPT | Performed by: PHYSICIAN ASSISTANT

## 2021-03-11 PROCEDURE — 1101F PT FALLS ASSESS-DOCD LE1/YR: CPT | Performed by: PHYSICIAN ASSISTANT

## 2021-03-11 PROCEDURE — G8754 DIAS BP LESS 90: HCPCS | Performed by: PHYSICIAN ASSISTANT

## 2021-03-11 PROCEDURE — 1090F PRES/ABSN URINE INCON ASSESS: CPT | Performed by: PHYSICIAN ASSISTANT

## 2021-03-11 PROCEDURE — G8427 DOCREV CUR MEDS BY ELIG CLIN: HCPCS | Performed by: PHYSICIAN ASSISTANT

## 2021-03-11 PROCEDURE — G8419 CALC BMI OUT NRM PARAM NOF/U: HCPCS | Performed by: PHYSICIAN ASSISTANT

## 2021-03-11 PROCEDURE — G8752 SYS BP LESS 140: HCPCS | Performed by: PHYSICIAN ASSISTANT

## 2021-03-11 PROCEDURE — G8399 PT W/DXA RESULTS DOCUMENT: HCPCS | Performed by: PHYSICIAN ASSISTANT

## 2021-03-11 PROCEDURE — 36415 COLL VENOUS BLD VENIPUNCTURE: CPT | Performed by: PHYSICIAN ASSISTANT

## 2021-03-11 NOTE — PROGRESS NOTES
Danitza Monday is a 66 y.o. female who presents to the office today with the following:  Chief Complaint   Patient presents with    Pre-op Exam    Cataract       HPI  Pt has been scheduled for outpatient cataract surgery at [facility] on March 24th and March 31st with Dr. Marylin Carlson. Freya Dangelo MD.  Per form the plan is for cataract extraction with intraocular lens implantation of the left and right eye due to decreased vision secondary to cataract formation. The pt currently has a VA of 20/100, 20/200 with IO of 12 and otherwise nl eye exam with exception of the cataracts. Pt is not on any blood thinners. Does not smoke and no h/o pulmonary dz. Has h/o HTN, no recent EKG. Some labs in Dec 2020 but per form needs to be w/in 3 mo so will get today. Pt is currently non-fasting. She otherwise reports feeling in good health today with no new complaints or concerns. Review of Systems   Constitutional: Negative for chills and fever. Respiratory: Negative. Negative for shortness of breath. Cardiovascular: Negative. Negative for chest pain, palpitations, orthopnea and leg swelling. Gastrointestinal: Negative. Neurological: Negative. See HPI.     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    Depression     Endocrine disorder     not current      Hyperlipidemia     Hypertension     Menopause     Metabolic syndrome     Vitamin D deficiency        Past Surgical History:   Procedure Laterality Date    COLONOSCOPY N/A 4/23/2018    COLONOSCOPY POSSIBLE DILATION performed by Sherri Edward MD at [facility]27 HX BREAST BIOPSY Left     benign    HX COLONOSCOPY      nl, done in [facility]    HX COLONOSCOPY  2018    polyp    HX ENDOSCOPY  2018    HX GYN  1996    [facility]     HX HYSTERECTOMY      age 52    HX OOPHORECTOMY      SC BREAST SURGERY PROCEDURE UNLISTED  8/2010    Breast BX; negative       Allergies   Allergen Reactions    Ace Inhibitors Cough    Lipitor [Atorvastatin] Myalgia       Current Outpatient Medications   Medication Sig    amLODIPine (NORVASC) 10 mg tablet Take 1 Tab by mouth daily.  losartan (COZAAR) 100 mg tablet Take 1 Tab by mouth daily.  fenofibrate nanocrystallized (TRICOR) 145 mg tablet TAKE 1 TABLET BY MOUTH ONCE DAILY    rosuvastatin (CRESTOR) 40 mg tablet TAKE 1 TABLET BY MOUTH ONCE DAILY    FLUoxetine (PROzac) 20 mg capsule TAKE 1 CAPSULE BY MOUTH ONCE DAILY    cholecalciferol (VITAMIN D3) (2,000 UNITS /50 MCG) cap capsule Take  by mouth daily.  acetaminophen (TYLENOL) 500 mg tablet Take  by mouth every six (6) hours as needed for Pain. No current facility-administered medications for this visit. Social History     Socioeconomic History    Marital status: SINGLE     Spouse name: Not on file    Number of children: Not on file    Years of education: Not on file    Highest education level: Not on file   Tobacco Use    Smoking status: Never Smoker    Smokeless tobacco: Never Used   Substance and Sexual Activity    Alcohol use:  Yes     Alcohol/week: 0.0 standard drinks     Comment: social    Drug use: No    Sexual activity: Never     Partners: Male   Other Topics Concern     Service No    Blood Transfusions No    Caffeine Concern No    Occupational Exposure No    Hobby Hazards No    Sleep Concern No    Stress Concern No    Weight Concern No    Special Diet No    Back Care No    Exercise Yes     Comment: goes to [employer] No    Seat Belt Yes    Self-Exams Yes       Family History   Problem Relation Age of Onset    Hypertension Brother     Elevated Lipids Brother     Breast Cancer Maternal Aunt     No Known Problems Mother          Physical Exam:  Visit Vitals  /70 (BP 1 Location: Left upper arm, BP Patient Position: At rest, BP Cuff Size: Adult)   Pulse 70   Temp 98.3 °F (36.8 °C) (Oral)   Resp 18   Ht 5' 3\" (1.6 m)   Wt 147 lb (66.7 kg)   SpO2 98%   BMI 26.04 kg/m² Physical Exam  Vitals signs and nursing note reviewed. Constitutional:       Appearance: Normal appearance. HENT:      Head: Normocephalic and atraumatic. Eyes:      Conjunctiva/sclera: Conjunctivae normal.   Neck:      Musculoskeletal: Neck supple. Cardiovascular:      Rate and Rhythm: Normal rate and regular rhythm. Pulses: Normal pulses. Heart sounds: Normal heart sounds. Pulmonary:      Effort: Pulmonary effort is normal.      Breath sounds: Normal breath sounds. Abdominal:      Palpations: Abdomen is soft. Tenderness: There is no abdominal tenderness. Musculoskeletal:         General: No swelling. Skin:     General: Skin is warm and dry. Neurological:      Mental Status: She is alert and oriented to person, place, and time. Psychiatric:         Mood and Affect: Mood normal.         Assessment/Plan:    ICD-10-CM ICD-9-CM    1. Pre-operative clearance  U47.303 L11.87 METABOLIC PANEL, BASIC      CBC WITH AUTOMATED DIFF      AMB POC EKG ROUTINE W/ 12 LEADS, INTER & REP      CBC WITH AUTOMATED DIFF      METABOLIC PANEL, BASIC      CO HANDLG&/OR CONVEY OF SPEC FOR TR OFFICE TO LAB      COLLECTION VENOUS BLOOD,VENIPUNCTURE   2. Abnormal EKG  R94.31 794.31 REFERRAL TO CARDIOLOGY       no cardiac sxs but ekg shows incomplete LBBB. A lot of artifact but looks a little different than previous. No acute change or sign of active ischemia. Would like her to f/u with cardiology, but otherwise okay for local anesthesia procedure pending labs are okay and pt remains asymptomatic prior to surgery. Discussed A&P with Dr. Roe Osman who also reviewed EKG and is in agreement. Pt to seek care in interim for any new sxs or other concerns. Pt verbalizes understanding and agrees with the plan.     Stacy Torres PA-C

## 2021-03-13 LAB
ANION GAP SERPL CALC-SCNC: 6 MMOL/L (ref 5–15)
BASOPHILS # BLD: 0 K/UL (ref 0–0.1)
BASOPHILS NFR BLD: 1 % (ref 0–1)
BUN SERPL-MCNC: 20 MG/DL (ref 6–20)
BUN/CREAT SERPL: 18 (ref 12–20)
CALCIUM SERPL-MCNC: 9.9 MG/DL (ref 8.5–10.1)
CHLORIDE SERPL-SCNC: 108 MMOL/L (ref 97–108)
CO2 SERPL-SCNC: 26 MMOL/L (ref 21–32)
CREAT SERPL-MCNC: 1.12 MG/DL (ref 0.55–1.02)
DIFFERENTIAL METHOD BLD: ABNORMAL
EOSINOPHIL # BLD: 0.2 K/UL (ref 0–0.4)
EOSINOPHIL NFR BLD: 3 % (ref 0–7)
ERYTHROCYTE [DISTWIDTH] IN BLOOD BY AUTOMATED COUNT: 14.6 % (ref 11.5–14.5)
GLUCOSE SERPL-MCNC: 101 MG/DL (ref 65–100)
HCT VFR BLD AUTO: 38.1 % (ref 35–47)
HGB BLD-MCNC: 11.2 G/DL (ref 11.5–16)
IMM GRANULOCYTES # BLD AUTO: 0 K/UL (ref 0–0.04)
IMM GRANULOCYTES NFR BLD AUTO: 0 % (ref 0–0.5)
LYMPHOCYTES # BLD: 1.5 K/UL (ref 0.8–3.5)
LYMPHOCYTES NFR BLD: 26 % (ref 12–49)
MCH RBC QN AUTO: 27.9 PG (ref 26–34)
MCHC RBC AUTO-ENTMCNC: 29.4 G/DL (ref 30–36.5)
MCV RBC AUTO: 95 FL (ref 80–99)
MONOCYTES # BLD: 0.4 K/UL (ref 0–1)
MONOCYTES NFR BLD: 7 % (ref 5–13)
NEUTS SEG # BLD: 3.8 K/UL (ref 1.8–8)
NEUTS SEG NFR BLD: 63 % (ref 32–75)
NRBC # BLD: 0 K/UL (ref 0–0.01)
NRBC BLD-RTO: 0 PER 100 WBC
PLATELET # BLD AUTO: 301 K/UL (ref 150–400)
PMV BLD AUTO: 11.1 FL (ref 8.9–12.9)
POTASSIUM SERPL-SCNC: 4.4 MMOL/L (ref 3.5–5.1)
RBC # BLD AUTO: 4.01 M/UL (ref 3.8–5.2)
SODIUM SERPL-SCNC: 140 MMOL/L (ref 136–145)
WBC # BLD AUTO: 6 K/UL (ref 3.6–11)

## 2021-03-15 ENCOUNTER — TELEPHONE (OUTPATIENT)
Dept: FAMILY MEDICINE CLINIC | Age: 76
End: 2021-03-15

## 2021-03-15 NOTE — TELEPHONE ENCOUNTER
Kayleigh, [address] [facility]             General Message/Vendor Calls     Caller's first and last name: n/a       Reason for call: Pt would like the results from her most recent blood work and cardiogram last week.        Callback required yes/no and why: yes       Best contact number(s): [phone]       Details to clarify the request: n/a       Atul Wayne

## 2021-03-16 NOTE — PROGRESS NOTES
D/w. Cleared for surgery. Will fax forms with results. Should keep planned f/u with cardiology for abnl EKG, but okay for local anesthesia (reviewed with CP who agrees). Pt also instructed to try MV with iron, avoid nsaids, stay hydrated. Will recheck labs in f/u in June.

## 2021-04-02 ENCOUNTER — OFFICE VISIT (OUTPATIENT)
Dept: CARDIOLOGY CLINIC | Age: 76
End: 2021-04-02
Payer: MEDICARE

## 2021-04-02 VITALS
HEART RATE: 74 BPM | BODY MASS INDEX: 26.22 KG/M2 | HEIGHT: 63 IN | RESPIRATION RATE: 16 BRPM | TEMPERATURE: 98.5 F | WEIGHT: 148 LBS | DIASTOLIC BLOOD PRESSURE: 72 MMHG | OXYGEN SATURATION: 97 % | SYSTOLIC BLOOD PRESSURE: 126 MMHG

## 2021-04-02 DIAGNOSIS — E78.2 MIXED HYPERLIPIDEMIA: ICD-10-CM

## 2021-04-02 DIAGNOSIS — R60.0 LOCALIZED EDEMA: ICD-10-CM

## 2021-04-02 DIAGNOSIS — I10 ESSENTIAL HYPERTENSION WITH GOAL BLOOD PRESSURE LESS THAN 140/90: ICD-10-CM

## 2021-04-02 DIAGNOSIS — R94.31 ABNORMAL EKG: Primary | ICD-10-CM

## 2021-04-02 DIAGNOSIS — I44.7 INCOMPLETE LEFT BUNDLE BRANCH BLOCK (LBBB): ICD-10-CM

## 2021-04-02 PROCEDURE — 99204 OFFICE O/P NEW MOD 45 MIN: CPT | Performed by: INTERNAL MEDICINE

## 2021-04-02 PROCEDURE — 93000 ELECTROCARDIOGRAM COMPLETE: CPT | Performed by: INTERNAL MEDICINE

## 2021-04-02 PROCEDURE — G8752 SYS BP LESS 140: HCPCS | Performed by: INTERNAL MEDICINE

## 2021-04-02 PROCEDURE — G8754 DIAS BP LESS 90: HCPCS | Performed by: INTERNAL MEDICINE

## 2021-04-02 PROCEDURE — 1101F PT FALLS ASSESS-DOCD LE1/YR: CPT | Performed by: INTERNAL MEDICINE

## 2021-04-02 PROCEDURE — G8427 DOCREV CUR MEDS BY ELIG CLIN: HCPCS | Performed by: INTERNAL MEDICINE

## 2021-04-02 PROCEDURE — 1090F PRES/ABSN URINE INCON ASSESS: CPT | Performed by: INTERNAL MEDICINE

## 2021-04-02 PROCEDURE — G8419 CALC BMI OUT NRM PARAM NOF/U: HCPCS | Performed by: INTERNAL MEDICINE

## 2021-04-02 PROCEDURE — G9717 DOC PT DX DEP/BP F/U NT REQ: HCPCS | Performed by: INTERNAL MEDICINE

## 2021-04-02 PROCEDURE — G8536 NO DOC ELDER MAL SCRN: HCPCS | Performed by: INTERNAL MEDICINE

## 2021-04-02 PROCEDURE — G8399 PT W/DXA RESULTS DOCUMENT: HCPCS | Performed by: INTERNAL MEDICINE

## 2021-04-02 NOTE — PROGRESS NOTES
Danita العراقي is a 66 y.o. female is here for cardiac evaluation/pre-op clearance with abnormal EKG. Hx hypertension, dyslipidemia, metabolic syndrome followed at [facility]. No prior known hx CAD. Seen for pre-op clearance for planned cataract sgy, EKG with new (?age) ILBBB. No current CV sx or complaints. .Was cleared by PCP for sgy local anesthesia given no CV sx or complaints. The patient denies chest pain/ shortness of breath, orthopnea, PND, LE edema, palpitations, syncope, presyncope or fatigue.        Patient Active Problem List    Diagnosis Date Noted    Chronic superficial gastritis without bleeding 04/23/2018    Esophagitis determined by endoscopy 04/23/2018    Diverticula of colon 04/23/2018    Elevated glucose 12/01/2017    Mixed hyperlipidemia 10/21/2016    Essential hypertension with goal blood pressure less than 140/90 07/18/2016    Advance directive discussed with patient 03/17/2016    Vitamin D deficiency 12/07/2015    Depression     Hyperlipidemia     Arthritis     Metabolic syndrome       Deuce Olmstead  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    Depression     Endocrine disorder     not current      Hyperlipidemia     Hypertension     Menopause     Metabolic syndrome     Vitamin D deficiency       Past Surgical History:   Procedure Laterality Date    COLONOSCOPY N/A 4/23/2018    COLONOSCOPY POSSIBLE DILATION performed by Apolonia Luther MD at [facility] 1827 HX BREAST BIOPSY Left     benign    HX COLONOSCOPY      nl, done in [place]    HX COLONOSCOPY  2018    polyp    HX ENDOSCOPY  2018    HX GYN  1996    [facility]     HX HYSTERECTOMY      age 52   [facility]OP[facility]ORECTOMY      [place] BREAST SURGERY PROCEDURE UNLISTED  8/2010    Breast BX; negative     Allergies   Allergen Reactions    Ace Inhibitors Cough    Lipitor [Atorvastatin] Myalgia      Family History   Problem Relation Age of Onset    Hypertension Brother     Elevated Lipids Brother     Breast Cancer Maternal Aunt     No Known Problems Mother       Social History     Socioeconomic History    Marital status: SINGLE     Spouse name: Not on file    Number of children: Not on file    Years of education: Not on file    Highest education level: Not on file   Occupational History    Not on file   Social Needs    Financial resource strain: Not on file    Food insecurity     Worry: Not on file     Inability: Not on file    Transportation needs     Medical: Not on file     Non-medical: Not on file   Tobacco Use    Smoking status: Never Smoker    Smokeless tobacco: Never Used   Substance and Sexual Activity    Alcohol use:  Yes     Alcohol/week: 0.0 standard drinks     Comment: social    Drug use: No    Sexual activity: Never     Partners: Male   Lifestyle    Physical activity     Days per week: Not on file     Minutes per session: Not on file    Stress: Not on file   Relationships    Social connections     Talks on phone: Not on file     Gets together: Not on file     Attends Jehovah's witness service: Not on file     Active member of club or organization: Not on file     Attends meetings of clubs or organizations: Not on file     Relationship status: Not on file    Intimate partner violence     Fear of current or ex partner: Not on file     Emotionally abused: Not on file     Physically abused: Not on file     Forced sexual activity: Not on file   Other Topics Concern     Service No    Blood Transfusions No    Caffeine Concern No    Occupational Exposure No    Hobby Hazards No    Sleep Concern No    Stress Concern No    Weight Concern No    Special Diet No    Back Care No    Exercise Yes     Comment: goes to [employer] No    Seat Belt Yes    Self-Exams Yes   Social History Narrative    Not on file      Current Outpatient Medications   Medication Sig    tobramycin (TOBREX) 0.3 % ophthalmic solution     prednisoLONE acetate (PRED FORTE) 1 % ophthalmic suspension     amLODIPine (NORVASC) 10 mg tablet Take 1 Tab by mouth daily.  losartan (COZAAR) 100 mg tablet Take 1 Tab by mouth daily.  fenofibrate nanocrystallized (TRICOR) 145 mg tablet TAKE 1 TABLET BY MOUTH ONCE DAILY    rosuvastatin (CRESTOR) 40 mg tablet TAKE 1 TABLET BY MOUTH ONCE DAILY    FLUoxetine (PROzac) 20 mg capsule TAKE 1 CAPSULE BY MOUTH ONCE DAILY    cholecalciferol (VITAMIN D3) (2,000 UNITS /50 MCG) cap capsule Take  by mouth daily.  acetaminophen (TYLENOL) 500 mg tablet Take  by mouth every six (6) hours as needed for Pain. No current facility-administered medications for this visit. Review of Symptoms:    CONST  No weight change. No fever, chills, sweats    ENT No visual changes, URI sx, sore throat    CV  See HPI   RESP  No cough, or sputum, wheezing. Also see HPI   GI  No abdominal pain or change in bowel habits. No heartburn or dysphagia. No melena or rectal bleeding.   No dysuria, urgency, frequency, hematuria   MSKEL  No joint pain, swelling. No muscle pain. SKIN  No rash or lesions. NEURO  No headache, syncope, or seizure. No weakness, loss of sensation, or paresthesias. PSYCH  No low mood or depression  No anxiety. HE/LYMPH  No easy bruising, abnormal bleeding, or enlarged glands. Physical ExamPhysical Exam:    Visit Vitals  /72 (BP 1 Location: Left arm, BP Patient Position: Sitting, BP Cuff Size: Adult)   Pulse 74   Temp 98.5 °F (36.9 °C) (Temporal)   Resp 16   Ht 5' 3\" (1.6 m)   Wt 148 lb (67.1 kg)   SpO2 97%   BMI 26.22 kg/m²     Gen: NAD  HEENT:  PERRL, throat clear  Neck: no adenopathy, no thyromegaly, no JVD   Heart:  Regular,Nl S1S2,  no murmur, gallop or rub. Lungs:  clear  Abdomen:   Soft, non-tender, bowel sounds are active.    Extremities:  No edema  Pulse: symmetric  Neuro: A&O times 3, No focal neuro deficits    Cardiographics    ECG: NSR, ILBBB, NST    Labs:   Lab Results   Component Value Date/Time    Sodium 140 03/11/2021 02:04 PM    Sodium 142 12/03/2020 09:12 AM    Sodium 140 06/03/2020 08:34 AM    Sodium 139 01/13/2020 09:48 AM    Sodium 139 06/11/2019 09:04 AM    Potassium 4.4 03/11/2021 02:04 PM    Potassium 4.7 12/03/2020 09:12 AM    Potassium 4.4 06/03/2020 08:34 AM    Potassium 4.4 01/13/2020 09:48 AM    Potassium 4.2 06/11/2019 09:04 AM    Chloride 108 03/11/2021 02:04 PM    Chloride 105 12/03/2020 09:12 AM    Chloride 103 06/03/2020 08:34 AM    Chloride 103 01/13/2020 09:48 AM    Chloride 103 06/11/2019 09:04 AM    CO2 26 03/11/2021 02:04 PM    CO2 20 12/03/2020 09:12 AM    CO2 21 06/03/2020 08:34 AM    CO2 20 01/13/2020 09:48 AM    CO2 23 06/11/2019 09:04 AM    Anion gap 6 03/11/2021 02:04 PM    Glucose 101 (H) 03/11/2021 02:04 PM    Glucose 83 12/03/2020 09:12 AM    Glucose 106 (H) 06/03/2020 08:34 AM    Glucose 101 (H) 01/13/2020 09:48 AM    Glucose 97 06/11/2019 09:04 AM    BUN 20 03/11/2021 02:04 PM    BUN 14 12/03/2020 09:12 AM    BUN 18 06/03/2020 08:34 AM    BUN 19 01/13/2020 09:48 AM    BUN 17 06/11/2019 09:04 AM    Creatinine 1.12 (H) 03/11/2021 02:04 PM    Creatinine 0.95 12/03/2020 09:12 AM    Creatinine 0.90 06/03/2020 08:34 AM    Creatinine 0.98 01/13/2020 09:48 AM    Creatinine 0.94 06/11/2019 09:04 AM    BUN/Creatinine ratio 18 03/11/2021 02:04 PM    BUN/Creatinine ratio 15 12/03/2020 09:12 AM    BUN/Creatinine ratio 20 06/03/2020 08:34 AM    BUN/Creatinine ratio 19 01/13/2020 09:48 AM    BUN/Creatinine ratio 18 06/11/2019 09:04 AM    GFR est AA 57 (L) 03/11/2021 02:04 PM    GFR est AA 67 12/03/2020 09:12 AM    GFR est AA 71 06/03/2020 08:34 AM    GFR est AA 65 01/13/2020 09:48 AM    GFR est AA 68 06/11/2019 09:04 AM    GFR est non-AA 47 (L) 03/11/2021 02:04 PM    GFR est non-AA 58 (L) 12/03/2020 09:12 AM    GFR est non-AA 62 06/03/2020 08:34 AM    GFR est non-AA 56 (L) 01/13/2020 09:48 AM    GFR est non-AA 59 (L) 06/11/2019 09:04 AM    Calcium 9.9 03/11/2021 02:04 PM Calcium 10.5 (H) 12/03/2020 09:12 AM    Calcium 10.9 (H) 06/03/2020 08:34 AM    Calcium 10.5 (H) 01/13/2020 09:48 AM    Calcium 10.2 06/11/2019 09:04 AM    Bilirubin, total 0.3 06/03/2020 08:34 AM    Bilirubin, total 0.3 01/13/2020 09:48 AM    Bilirubin, total 0.3 06/12/2018 07:38 AM    Bilirubin, total 0.4 12/01/2017 09:12 AM    Bilirubin, total 0.3 04/26/2017 10:47 AM    Alk. phosphatase 44 06/03/2020 08:34 AM    Alk. phosphatase 43 01/13/2020 09:48 AM    Alk. phosphatase 39 06/12/2018 07:38 AM    Alk. phosphatase 46 12/01/2017 09:12 AM    Alk.  phosphatase 37 (L) 04/26/2017 10:47 AM    Protein, total 6.9 06/03/2020 08:34 AM    Protein, total 6.9 01/13/2020 09:48 AM    Protein, total 7.3 06/12/2018 07:38 AM    Protein, total 7.3 12/01/2017 09:12 AM    Protein, total 7.2 04/26/2017 10:47 AM    Albumin 4.5 06/03/2020 08:34 AM    Albumin 4.9 (H) 01/13/2020 09:48 AM    Albumin 4.8 06/12/2018 07:38 AM    Albumin 4.6 12/01/2017 09:12 AM    Albumin 4.5 04/26/2017 10:47 AM    A-G Ratio 1.9 06/03/2020 08:34 AM    A-G Ratio 2.5 (H) 01/13/2020 09:48 AM    A-G Ratio 1.9 06/12/2018 07:38 AM    A-G Ratio 1.7 12/01/2017 09:12 AM    A-G Ratio 1.7 04/26/2017 10:47 AM    ALT (SGPT) 13 06/03/2020 08:34 AM    ALT (SGPT) 15 01/13/2020 09:48 AM    ALT (SGPT) 24 06/11/2019 09:04 AM    ALT (SGPT) 19 06/12/2018 07:38 AM    ALT (SGPT) 18 12/01/2017 09:12 AM     No results found for: CPK, CPKX, CPX  Lab Results   Component Value Date/Time    Cholesterol, total 152 06/03/2020 08:34 AM    Cholesterol, total 155 06/11/2019 09:04 AM    Cholesterol, total 154 06/12/2018 07:38 AM    Cholesterol, total 167 12/01/2017 09:12 AM    Cholesterol, total 227 (H) 04/26/2017 10:47 AM    HDL Cholesterol 69 06/03/2020 08:34 AM    HDL Cholesterol 68 06/11/2019 09:04 AM    HDL Cholesterol 66 06/12/2018 07:38 AM    HDL Cholesterol 71 12/01/2017 09:12 AM    HDL Cholesterol 68 04/26/2017 10:47 AM    LDL, calculated 68 06/03/2020 08:34 AM    LDL, calculated 66 06/11/2019 09:04 AM    LDL, calculated 66 06/12/2018 07:38 AM    LDL, calculated 75 12/01/2017 09:12 AM    LDL, calculated 126 (H) 04/26/2017 10:47 AM    Triglyceride 74 06/03/2020 08:34 AM    Triglyceride 106 06/11/2019 09:04 AM    Triglyceride 111 06/12/2018 07:38 AM    Triglyceride 105 12/01/2017 09:12 AM    Triglyceride 164 (H) 04/26/2017 10:47 AM     No results found for this or any previous visit. Assessment:         Patient Active Problem List    Diagnosis Date Noted    Chronic superficial gastritis without bleeding 04/23/2018    Esophagitis determined by endoscopy 04/23/2018    Diverticula of colon 04/23/2018    Elevated glucose 12/01/2017    Mixed hyperlipidemia 10/21/2016    Essential hypertension with goal blood pressure less than 140/90 07/18/2016    Advance directive discussed with patient 03/17/2016    Vitamin D deficiency 12/07/2015    Depression     Hyperlipidemia     Arthritis     Metabolic syndrome      66 y.o. female is here for cardiac evaluation/pre-op clearance with abnormal EKG. Hx hypertension, dyslipidemia, metabolic syndrome followed at [facility]. No prior known hx CAD. Seen for pre-op clearance for planned cataract sgy, EKG with new (?age) ILBBB. No current CV sx or complaints. .Was cleared by PCP for sgy local anesthesia given no CV sx or complaints. Plan:     Doing well with no adverse cardiac symptoms--new LE edema after increased amlodipine to 10--will cut back to 5mg for now. New ILBBB with CV risks hypertension/dyslipidemia--favor Lexiscan to r/o ischemia or LV dysfunction  Echo/doppler  Fu after testing to discuss--cardiac clearance for anesthesia for planned cataract sgy  Lipids and labs followed by PCP.       Farooq Moody MD

## 2021-04-02 NOTE — PROGRESS NOTES
Chief Complaint   Patient presents with    New Patient     referred by Betito Trujillo     Abnormal Cardiac Test     abonormal ekg      Verified patient with two patient identifiers. Medications reviewed/approved by Dr. Brett Bourne. 1. Have you been to the ER, urgent care clinic since your last visit? Hospitalized since your last visit? new pt     2. Have you seen or consulted any other health care providers outside of the [address] [place] [address] since your last visit? Include any pap smears or colon screening.  New pt

## 2021-04-12 ENCOUNTER — HOSPITAL ENCOUNTER (OUTPATIENT)
Dept: ULTRASOUND IMAGING | Age: 76
Discharge: HOME | End: 2021-04-12
Attending: INTERNAL MEDICINE
Payer: MEDICARE

## 2021-04-12 DIAGNOSIS — R60.0 LOCALIZED EDEMA: ICD-10-CM

## 2021-04-12 DIAGNOSIS — I10 ESSENTIAL HYPERTENSION WITH GOAL BLOOD PRESSURE LESS THAN 140/90: ICD-10-CM

## 2021-04-12 DIAGNOSIS — R94.31 ABNORMAL EKG: ICD-10-CM

## 2021-04-12 DIAGNOSIS — I44.7 INCOMPLETE LEFT BUNDLE BRANCH BLOCK (LBBB): ICD-10-CM

## 2021-04-12 LAB
ECHO AO ROOT DIAM: 2.63 CM
ECHO AV PEAK GRADIENT: 9.27 MMHG
ECHO AV PEAK VELOCITY: 152.23 CM/S
ECHO EST RA PRESSURE: 10 MMHG
ECHO LA MAJOR AXIS: 3.72 CM
ECHO LV E' SEPTAL VELOCITY: 8.78 CM/S
ECHO LV INTERNAL DIMENSION DIASTOLIC: 6.35 CM (ref 3.9–5.3)
ECHO LV INTERNAL DIMENSION SYSTOLIC: 4.03 CM
ECHO LV IVSD: 1.05 CM (ref 0.6–0.9)
ECHO LV MASS 2D: 278.9 G (ref 67–162)
ECHO LV POSTERIOR WALL DIASTOLIC: 0.99 CM (ref 0.6–0.9)
ECHO LVOT DIAM: 1.99 CM
ECHO MV A VELOCITY: 116.98 CM/S
ECHO MV E DECELERATION TIME (DT): 206.97 MS
ECHO MV E VELOCITY: 95.87 CM/S
ECHO MV E/A RATIO: 0.82
ECHO MV E/E' SEPTAL: 10.92
ECHO RIGHT VENTRICULAR SYSTOLIC PRESSURE (RVSP): 31.93 MMHG
ECHO TV REGURGITANT MAX VELOCITY: 234.13 CM/S
ECHO TV REGURGITANT PEAK GRADIENT: 21.93 MMHG

## 2021-04-12 PROCEDURE — 93306 TTE W/DOPPLER COMPLETE: CPT

## 2021-04-13 ENCOUNTER — TELEPHONE (OUTPATIENT)
Dept: CARDIOLOGY CLINIC | Age: 76
End: 2021-04-13

## 2021-04-13 NOTE — TELEPHONE ENCOUNTER
----- Message from Evita Vegas MD sent at 4/12/2021  3:48 PM EDT -----  Regarding: echo  Advise echo looks ok--normal LV pumping function, only mild mitral valve regurg--no concerns. Lexiscan MPI not yet done.   Thanks [facility] [place]

## 2021-04-19 ENCOUNTER — TELEPHONE (OUTPATIENT)
Dept: NON INVASIVE DIAGNOSTICS | Age: 76
End: 2021-04-19

## 2021-04-21 ENCOUNTER — HOSPITAL ENCOUNTER (OUTPATIENT)
Dept: NON INVASIVE DIAGNOSTICS | Age: 76
Discharge: HOME | End: 2021-04-21
Attending: INTERNAL MEDICINE
Payer: MEDICARE

## 2021-04-21 ENCOUNTER — HOSPITAL ENCOUNTER (OUTPATIENT)
Dept: NUCLEAR MEDICINE | Age: 76
Discharge: HOME | End: 2021-04-21
Attending: INTERNAL MEDICINE
Payer: MEDICARE

## 2021-04-21 DIAGNOSIS — E78.2 MIXED HYPERLIPIDEMIA: ICD-10-CM

## 2021-04-21 DIAGNOSIS — I10 ESSENTIAL HYPERTENSION WITH GOAL BLOOD PRESSURE LESS THAN 140/90: ICD-10-CM

## 2021-04-21 DIAGNOSIS — R94.31 ABNORMAL EKG: ICD-10-CM

## 2021-04-21 DIAGNOSIS — I44.7 INCOMPLETE LEFT BUNDLE BRANCH BLOCK (LBBB): ICD-10-CM

## 2021-04-21 LAB
STRESS BASELINE DIAS BP: 82 MMHG
STRESS BASELINE HR: 67 BPM
STRESS BASELINE SYS BP: 182 MMHG
STRESS ESTIMATED WORKLOAD: 1 METS
STRESS EXERCISE DUR MIN: NORMAL MIN:SEC
STRESS PEAK DIAS BP: 82 MMHG
STRESS PEAK SYS BP: 182 MMHG
STRESS PERCENT HR ACHIEVED: 56 %
STRESS POST PEAK HR: 80 BPM
STRESS RATE PRESSURE PRODUCT: NORMAL BPM*MMHG
STRESS ST DEPRESSION: 0 MM
STRESS ST ELEVATION: 0 MM
STRESS TARGET HR: 143 BPM

## 2021-04-21 PROCEDURE — 74011250636 HC RX REV CODE- 250/636: Performed by: INTERNAL MEDICINE

## 2021-04-21 PROCEDURE — A9500 TC99M SESTAMIBI: HCPCS

## 2021-04-21 RX ADMIN — REGADENOSON 0.4 MG: 0.08 INJECTION, SOLUTION INTRAVENOUS at 07:44

## 2021-04-27 ENCOUNTER — TELEPHONE (OUTPATIENT)
Dept: CARDIOLOGY CLINIC | Age: 76
End: 2021-04-27

## 2021-04-27 NOTE — TELEPHONE ENCOUNTER
----- Message from Farooq Moody MD sent at 4/21/2021  1:36 PM EDT -----  Regarding: Karolina Car MPI  Advise normal--no blockages or ischemia, normal LV pumping function. . RTC 6 mos  thanks [address] [place] [address] with the patient. Verified patient with two patient identifiers. Results given and questions answered. Patient verbalized understanding.

## 2021-05-05 ENCOUNTER — ANESTHESIA EVENT (OUTPATIENT)
Dept: SURGERY | Age: 76
End: 2021-05-05
Payer: MEDICARE

## 2021-05-05 NOTE — ANESTHESIA PREPROCEDURE EVALUATION
Relevant Problems   NEUROLOGY   (+) Depression      CARDIOVASCULAR   (+) Essential hypertension with goal blood pressure less than 140/90      ENDOCRINE   (+) Arthritis       Anesthetic History   No history of anesthetic complications            Review of Systems / Medical History  Patient summary reviewed, nursing notes reviewed and pertinent labs reviewed    Pulmonary  Within defined limits                 Neuro/Psych         Psychiatric history (depression)     Cardiovascular    Hypertension          Hyperlipidemia      Comments: Echo without LV dysfunction/ wall motion abnormalities   GI/Hepatic/Renal     GERD           Endo/Other  Within defined limits           Other Findings              Physical Exam    Airway  Mallampati: I  TM Distance: > 6 cm  Neck ROM: normal range of motion   Mouth opening: Normal     Cardiovascular    Rhythm: regular  Rate: normal         Dental  No notable dental hx       Pulmonary  Breath sounds clear to auscultation               Abdominal  GI exam deferred       Other Findings            Anesthetic Plan    ASA: 2  Anesthesia type: MAC          Induction: Intravenous  Anesthetic plan and risks discussed with: Patient

## 2021-05-19 ENCOUNTER — OFFICE VISIT (OUTPATIENT)
Dept: FAMILY MEDICINE CLINIC | Age: 76
End: 2021-05-19
Payer: MEDICARE

## 2021-05-19 VITALS
SYSTOLIC BLOOD PRESSURE: 122 MMHG | OXYGEN SATURATION: 98 % | RESPIRATION RATE: 18 BRPM | HEIGHT: 63 IN | HEART RATE: 78 BPM | BODY MASS INDEX: 26.22 KG/M2 | WEIGHT: 148 LBS | TEMPERATURE: 98.1 F | DIASTOLIC BLOOD PRESSURE: 70 MMHG

## 2021-05-19 DIAGNOSIS — Z01.818 PRE-OP EVALUATION: Primary | ICD-10-CM

## 2021-05-19 PROCEDURE — G8752 SYS BP LESS 140: HCPCS | Performed by: PHYSICIAN ASSISTANT

## 2021-05-19 PROCEDURE — 99213 OFFICE O/P EST LOW 20 MIN: CPT | Performed by: PHYSICIAN ASSISTANT

## 2021-05-19 PROCEDURE — 1101F PT FALLS ASSESS-DOCD LE1/YR: CPT | Performed by: PHYSICIAN ASSISTANT

## 2021-05-19 PROCEDURE — G8754 DIAS BP LESS 90: HCPCS | Performed by: PHYSICIAN ASSISTANT

## 2021-05-19 PROCEDURE — 36415 COLL VENOUS BLD VENIPUNCTURE: CPT | Performed by: PHYSICIAN ASSISTANT

## 2021-05-19 PROCEDURE — 1090F PRES/ABSN URINE INCON ASSESS: CPT | Performed by: PHYSICIAN ASSISTANT

## 2021-05-19 PROCEDURE — G8399 PT W/DXA RESULTS DOCUMENT: HCPCS | Performed by: PHYSICIAN ASSISTANT

## 2021-05-19 PROCEDURE — G8419 CALC BMI OUT NRM PARAM NOF/U: HCPCS | Performed by: PHYSICIAN ASSISTANT

## 2021-05-19 PROCEDURE — G8427 DOCREV CUR MEDS BY ELIG CLIN: HCPCS | Performed by: PHYSICIAN ASSISTANT

## 2021-05-19 PROCEDURE — G9717 DOC PT DX DEP/BP F/U NT REQ: HCPCS | Performed by: PHYSICIAN ASSISTANT

## 2021-05-19 PROCEDURE — G8536 NO DOC ELDER MAL SCRN: HCPCS | Performed by: PHYSICIAN ASSISTANT

## 2021-05-19 NOTE — PROGRESS NOTES
Jimi Ferrera is a 64 y.o. female who presents to the office today with the following:  Chief Complaint   Patient presents with    Pre-op Exam     Cataract       HPI   Pt has plans for outpt cataract surgery with Dr. Charley Pruitt. Thi Alex on June 2nd and 9th  Has form for clearance for cataract extraction with intraocular lens implantation of the left eye due to decreased vision seconary to the cataract formation. The pt is reported to have visual acuity of 20/200 and IOP of 12 and otherwise nl eye exam.  Pt has already been cleared by her cardiologist Dr. Jacek Holden with EKG and stress test.  Pt reports feeling well today with no complaints or other concerns. Review of Systems   Constitutional: Negative. Negative for chills and fever. HENT: Negative. Respiratory: Negative. Negative for shortness of breath. Cardiovascular: Negative. Negative for chest pain. Gastrointestinal: Negative. Neurological: Negative. See HPI.     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    Depression     Endocrine disorder     not current      Hyperlipidemia     Hypertension     Menopause     Metabolic syndrome     Vitamin D deficiency        Past Surgical History:   Procedure Laterality Date    COLONOSCOPY N/A 4/23/2018    COLONOSCOPY POSSIBLE DILATION performed by Darren Herrera MD at [facility] 1827 HX BREAST BIOPSY Left     benign    HX COLONOSCOPY      nl, done in [facility]    HX COLONOSCOPY  2018    polyp    HX ENDOSCOPY  2018    HX GYN  1996    [facility]     HX HYSTERECTOMY      age 52   [facility] OOPHORECTOMY      [place] BREAST SURGERY PROCEDURE UNLISTED  8/2010    Breast BX; negative       Allergies   Allergen Reactions    Ace Inhibitors Cough    Lipitor [Atorvastatin] Myalgia       Current Outpatient Medications   Medication Sig    rosuvastatin (CRESTOR) 40 mg tablet TAKE 1 TABLET BY MOUTH ONCE DAILY    FLUoxetine (PROzac) 20 mg capsule TAKE 1 CAPSULE BY MOUTH ONCE DAILY  fenofibrate nanocrystallized (TRICOR) 145 mg tablet TAKE 1 TABLET BY MOUTH ONCE DAILY    tobramycin (TOBREX) 0.3 % ophthalmic solution     prednisoLONE acetate (PRED FORTE) 1 % ophthalmic suspension     amLODIPine (NORVASC) 10 mg tablet Take 1 Tab by mouth daily. (Patient taking differently: Take 5 mg by mouth daily.)    losartan (COZAAR) 100 mg tablet Take 1 Tab by mouth daily.  cholecalciferol (VITAMIN D3) (2,000 UNITS /50 MCG) cap capsule Take  by mouth daily.  acetaminophen (TYLENOL) 500 mg tablet Take  by mouth every six (6) hours as needed for Pain. No current facility-administered medications for this visit. Social History     Socioeconomic History    Marital status: SINGLE     Spouse name: Not on file    Number of children: Not on file    Years of education: Not on file    Highest education level: Not on file   Tobacco Use    Smoking status: Never Smoker    Smokeless tobacco: Never Used   Substance and Sexual Activity    Alcohol use: Yes     Alcohol/week: 0.0 standard drinks     Comment: social    Drug use: No    Sexual activity: Never     Partners: Male   Other Topics Concern     Service No    Blood Transfusions No    Caffeine Concern No    Occupational Exposure No    Hobby Hazards No    Sleep Concern No    Stress Concern No    Weight Concern No    Special Diet No    Back Care No    Exercise Yes     Comment: goes to [employer] No    Seat Belt Yes    Self-Exams Yes     Social Determinants of Health     Financial Resource Strain:     Difficulty of Paying Living Expenses:    Food Insecurity:     Worried About Running Out of Food in the Last Year:     [address] in the Last Year:    Transportation Needs:     Lack of Transportation (Medical):      Lack of Transportation (Non-Medical):    Physical Activity:     Days of Exercise per Week:     Minutes of Exercise per Session:    Stress:     Feeling of Stress :    Social Connections:     Frequency of Communication with Friends and Family:     Frequency of Social Gatherings with Friends and Family:     Attends Jainism Services:     Active Member of Clubs or Organizations:     Attends Club or Organization Meetings:     Marital Status:        Family History   Problem Relation Age of Onset    Hypertension Brother     Elevated Lipids Brother     Breast Cancer Maternal Aunt     No Known Problems Mother          Physical Exam:  Visit Vitals  /70 (BP 1 Location: Left upper arm, BP Patient Position: At rest, BP Cuff Size: Adult)   Pulse 78   Temp 98.1 °F (36.7 °C) (Oral)   Resp 18   Ht 5' 3\" (1.6 m)   Wt 148 lb (67.1 kg)   SpO2 98%   BMI 26.22 kg/m²     Physical Exam  Vitals and nursing note reviewed. Constitutional:       Appearance: Normal appearance. HENT:      Head: Normocephalic and atraumatic. Eyes:      Conjunctiva/sclera: Conjunctivae normal.   Cardiovascular:      Rate and Rhythm: Normal rate and regular rhythm. Pulses: Normal pulses. Heart sounds: Normal heart sounds. Pulmonary:      Effort: Pulmonary effort is normal.      Breath sounds: Normal breath sounds. Musculoskeletal:         General: No swelling. Cervical back: Neck supple. Skin:     General: Skin is warm and dry. Neurological:      Mental Status: She is alert and oriented to person, place, and time. Psychiatric:         Mood and Affect: Mood normal.         Assessment/Plan:    ICD-10-CM ICD-9-CM    1. Pre-op evaluation  Z01.818 V72.84 XR CHEST PA LAT      CBC WITH AUTOMATED DIFF      METABOLIC PANEL, COMPREHENSIVE      METABOLIC PANEL, COMPREHENSIVE      CBC WITH AUTOMATED DIFF      MA HANDLG&/OR CONVEY OF SPEC FOR TR OFFICE TO LAB      COLLECTION VENOUS BLOOD,VENIPUNCTURE       pt cleared pending results. Will finish forms and fax once results are available. Pt to notify/seek care in interim for any new sxs or other concerns.   Pt verbalizes understanding and agrees with the plan.    Ken Cunningham PA-C

## 2021-05-20 ENCOUNTER — HOSPITAL ENCOUNTER (OUTPATIENT)
Dept: GENERAL RADIOLOGY | Age: 76
Discharge: HOME | End: 2021-05-20
Payer: MEDICARE

## 2021-05-20 DIAGNOSIS — Z01.818 PRE-OP EVALUATION: ICD-10-CM

## 2021-05-20 LAB
ALBUMIN SERPL-MCNC: 4 G/DL (ref 3.5–5)
ALBUMIN/GLOB SERPL: 1.3 {RATIO} (ref 1.1–2.2)
ALP SERPL-CCNC: 51 U/L (ref 45–117)
ALT SERPL-CCNC: 21 U/L (ref 12–78)
ANION GAP SERPL CALC-SCNC: 6 MMOL/L (ref 5–15)
AST SERPL-CCNC: 24 U/L (ref 15–37)
BASOPHILS # BLD: 0.1 K/UL (ref 0–0.1)
BASOPHILS NFR BLD: 1 % (ref 0–1)
BILIRUB SERPL-MCNC: 0.3 MG/DL (ref 0.2–1)
BUN SERPL-MCNC: 16 MG/DL (ref 6–20)
BUN/CREAT SERPL: 14 (ref 12–20)
CALCIUM SERPL-MCNC: 10 MG/DL (ref 8.5–10.1)
CHLORIDE SERPL-SCNC: 111 MMOL/L (ref 97–108)
CO2 SERPL-SCNC: 25 MMOL/L (ref 21–32)
CREAT SERPL-MCNC: 1.15 MG/DL (ref 0.55–1.02)
DIFFERENTIAL METHOD BLD: ABNORMAL
EOSINOPHIL # BLD: 0.4 K/UL (ref 0–0.4)
EOSINOPHIL NFR BLD: 7 % (ref 0–7)
ERYTHROCYTE [DISTWIDTH] IN BLOOD BY AUTOMATED COUNT: 14.5 % (ref 11.5–14.5)
GLOBULIN SER CALC-MCNC: 3 G/DL (ref 2–4)
GLUCOSE SERPL-MCNC: 92 MG/DL (ref 65–100)
HCT VFR BLD AUTO: 38.5 % (ref 35–47)
HGB BLD-MCNC: 11.5 G/DL (ref 11.5–16)
IMM GRANULOCYTES # BLD AUTO: 0 K/UL (ref 0–0.04)
IMM GRANULOCYTES NFR BLD AUTO: 1 % (ref 0–0.5)
LYMPHOCYTES # BLD: 1.8 K/UL (ref 0.8–3.5)
LYMPHOCYTES NFR BLD: 29 % (ref 12–49)
MCH RBC QN AUTO: 28.5 PG (ref 26–34)
MCHC RBC AUTO-ENTMCNC: 29.9 G/DL (ref 30–36.5)
MCV RBC AUTO: 95.5 FL (ref 80–99)
MONOCYTES # BLD: 0.4 K/UL (ref 0–1)
MONOCYTES NFR BLD: 6 % (ref 5–13)
NEUTS SEG # BLD: 3.5 K/UL (ref 1.8–8)
NEUTS SEG NFR BLD: 56 % (ref 32–75)
NRBC # BLD: 0 K/UL (ref 0–0.01)
NRBC BLD-RTO: 0 PER 100 WBC
PLATELET # BLD AUTO: 284 K/UL (ref 150–400)
PMV BLD AUTO: 11.4 FL (ref 8.9–12.9)
POTASSIUM SERPL-SCNC: 4.9 MMOL/L (ref 3.5–5.1)
PROT SERPL-MCNC: 7 G/DL (ref 6.4–8.2)
RBC # BLD AUTO: 4.03 M/UL (ref 3.8–5.2)
SODIUM SERPL-SCNC: 142 MMOL/L (ref 136–145)
WBC # BLD AUTO: 6.1 K/UL (ref 3.6–11)

## 2021-05-20 PROCEDURE — 71046 X-RAY EXAM CHEST 2 VIEWS: CPT

## 2021-05-20 NOTE — PROGRESS NOTES
Notify pt her kidney tests are still slightly up but stable from 2 mo ago, should stay properly hydrate and avoid nsaids. Remaining labs overall unremarkable. Will complete her preop forms and fax.

## 2021-06-01 PROBLEM — H25.12 AGE-RELATED NUCLEAR CATARACT, LEFT EYE: Chronic | Status: ACTIVE | Noted: 2021-06-01

## 2021-06-02 ENCOUNTER — HOSPITAL ENCOUNTER (OUTPATIENT)
Age: 76
Setting detail: OUTPATIENT SURGERY
Discharge: HOME | End: 2021-06-02
Attending: OPHTHALMOLOGY | Admitting: OPHTHALMOLOGY
Payer: MEDICARE

## 2021-06-02 ENCOUNTER — ANESTHESIA (OUTPATIENT)
Dept: SURGERY | Age: 76
End: 2021-06-02
Payer: MEDICARE

## 2021-06-02 VITALS
HEIGHT: 63 IN | RESPIRATION RATE: 16 BRPM | TEMPERATURE: 97.8 F | HEART RATE: 70 BPM | OXYGEN SATURATION: 100 % | DIASTOLIC BLOOD PRESSURE: 62 MMHG | BODY MASS INDEX: 26.22 KG/M2 | WEIGHT: 148 LBS | SYSTOLIC BLOOD PRESSURE: 166 MMHG

## 2021-06-02 PROBLEM — H25.12 AGE-RELATED NUCLEAR CATARACT, LEFT EYE: Chronic | Status: RESOLVED | Noted: 2021-06-01 | Resolved: 2021-06-02

## 2021-06-02 PROCEDURE — 77030020828: Performed by: OPHTHALMOLOGY

## 2021-06-02 PROCEDURE — 74011250636 HC RX REV CODE- 250/636: Performed by: ANESTHESIOLOGY

## 2021-06-02 PROCEDURE — 2709999900 HC NON-CHARGEABLE SUPPLY: Performed by: OPHTHALMOLOGY

## 2021-06-02 PROCEDURE — 74011250636 HC RX REV CODE- 250/636: Performed by: OPHTHALMOLOGY

## 2021-06-02 PROCEDURE — 76060000031 HC ANESTHESIA FIRST 0.5 HR: Performed by: OPHTHALMOLOGY

## 2021-06-02 PROCEDURE — 76010000154 HC OR TIME FIRST 0.5 HR: Performed by: OPHTHALMOLOGY

## 2021-06-02 PROCEDURE — 74011000250 HC RX REV CODE- 250: Performed by: OPHTHALMOLOGY

## 2021-06-02 PROCEDURE — 77030014067 HC TIP PHACO KLMN ALCN -B: Performed by: OPHTHALMOLOGY

## 2021-06-02 PROCEDURE — 77030035283: Performed by: OPHTHALMOLOGY

## 2021-06-02 PROCEDURE — 76210000020 HC REC RM PH II FIRST 0.5 HR: Performed by: OPHTHALMOLOGY

## 2021-06-02 PROCEDURE — 77030007855 HC KNF OPHTH IKNF ALCN -A: Performed by: OPHTHALMOLOGY

## 2021-06-02 PROCEDURE — V2632 POST CHMBR INTRAOCULAR LENS: HCPCS | Performed by: OPHTHALMOLOGY

## 2021-06-02 PROCEDURE — 77030013987 HC SLV OPTH IRR ALCN -B: Performed by: OPHTHALMOLOGY

## 2021-06-02 PROCEDURE — 77030013353: Performed by: OPHTHALMOLOGY

## 2021-06-02 RX ORDER — TETRACAINE HYDROCHLORIDE 5 MG/ML
1 SOLUTION OPHTHALMIC AS NEEDED
Status: COMPLETED | OUTPATIENT
Start: 2021-06-02 | End: 2021-06-02

## 2021-06-02 RX ORDER — KETOROLAC TROMETHAMINE 5 MG/ML
1 SOLUTION OPHTHALMIC
Status: COMPLETED | OUTPATIENT
Start: 2021-06-02 | End: 2021-06-02

## 2021-06-02 RX ORDER — MIDAZOLAM HYDROCHLORIDE 1 MG/ML
INJECTION, SOLUTION INTRAMUSCULAR; INTRAVENOUS AS NEEDED
Status: DISCONTINUED | OUTPATIENT
Start: 2021-06-02 | End: 2021-06-02 | Stop reason: HOSPADM

## 2021-06-02 RX ORDER — LIDOCAINE HYDROCHLORIDE 20 MG/ML
2 INJECTION, SOLUTION EPIDURAL; INFILTRATION; INTRACAUDAL; PERINEURAL ONCE
Status: COMPLETED | OUTPATIENT
Start: 2021-06-02 | End: 2021-06-02

## 2021-06-02 RX ORDER — TROPICAMIDE 10 MG/ML
1 SOLUTION/ DROPS OPHTHALMIC
Status: COMPLETED | OUTPATIENT
Start: 2021-06-02 | End: 2021-06-02

## 2021-06-02 RX ORDER — PHENYLEPHRINE HYDROCHLORIDE 100 MG/ML
1 SOLUTION/ DROPS OPHTHALMIC
Status: COMPLETED | OUTPATIENT
Start: 2021-06-02 | End: 2021-06-02

## 2021-06-02 RX ORDER — SODIUM CHLORIDE, SODIUM LACTATE, POTASSIUM CHLORIDE, CALCIUM CHLORIDE 600; 310; 30; 20 MG/100ML; MG/100ML; MG/100ML; MG/100ML
25 INJECTION, SOLUTION INTRAVENOUS CONTINUOUS
Status: DISCONTINUED | OUTPATIENT
Start: 2021-06-02 | End: 2021-06-02 | Stop reason: HOSPADM

## 2021-06-02 RX ORDER — TOBRAMYCIN AND DEXAMETHASONE 3; 1 MG/ML; MG/ML
1 SUSPENSION/ DROPS OPHTHALMIC AS NEEDED
Status: COMPLETED | OUTPATIENT
Start: 2021-06-02 | End: 2021-06-02

## 2021-06-02 RX ORDER — TETRACAINE HYDROCHLORIDE 5 MG/ML
SOLUTION OPHTHALMIC AS NEEDED
Status: DISCONTINUED | OUTPATIENT
Start: 2021-06-02 | End: 2021-06-02 | Stop reason: HOSPADM

## 2021-06-02 RX ADMIN — TROPICAMIDE 1 DROP: 10 SOLUTION/ DROPS OPHTHALMIC at 10:11

## 2021-06-02 RX ADMIN — TETRACAINE HYDROCHLORIDE 1 DROP: 5 SOLUTION OPHTHALMIC at 10:11

## 2021-06-02 RX ADMIN — MIDAZOLAM 2 MG: 1 INJECTION INTRAMUSCULAR; INTRAVENOUS at 10:51

## 2021-06-02 RX ADMIN — PHENYLEPHRINE HYDROCHLORIDE 1 DROP: 100 SOLUTION/ DROPS OPHTHALMIC at 10:22

## 2021-06-02 RX ADMIN — KETOROLAC TROMETHAMINE 1 DROP: 5 SOLUTION OPHTHALMIC at 09:55

## 2021-06-02 RX ADMIN — TETRACAINE HYDROCHLORIDE 1 DROP: 5 SOLUTION OPHTHALMIC at 09:55

## 2021-06-02 RX ADMIN — LIDOCAINE HYDROCHLORIDE 2 DROP: 35 GEL OPHTHALMIC at 10:11

## 2021-06-02 RX ADMIN — PHENYLEPHRINE HYDROCHLORIDE 1 DROP: 100 SOLUTION/ DROPS OPHTHALMIC at 09:55

## 2021-06-02 RX ADMIN — SODIUM CHLORIDE, POTASSIUM CHLORIDE, SODIUM LACTATE AND CALCIUM CHLORIDE 25 ML/HR: 600; 310; 30; 20 INJECTION, SOLUTION INTRAVENOUS at 10:12

## 2021-06-02 RX ADMIN — KETOROLAC TROMETHAMINE 1 DROP: 5 SOLUTION OPHTHALMIC at 10:11

## 2021-06-02 RX ADMIN — TROPICAMIDE 1 DROP: 10 SOLUTION/ DROPS OPHTHALMIC at 10:22

## 2021-06-02 RX ADMIN — PHENYLEPHRINE HYDROCHLORIDE 1 DROP: 100 SOLUTION/ DROPS OPHTHALMIC at 10:11

## 2021-06-02 RX ADMIN — LIDOCAINE HYDROCHLORIDE 2 DROP: 35 GEL OPHTHALMIC at 10:22

## 2021-06-02 RX ADMIN — LIDOCAINE HYDROCHLORIDE 2 DROP: 35 GEL OPHTHALMIC at 09:55

## 2021-06-02 RX ADMIN — TROPICAMIDE 1 DROP: 10 SOLUTION/ DROPS OPHTHALMIC at 09:55

## 2021-06-02 RX ADMIN — KETOROLAC TROMETHAMINE 1 DROP: 5 SOLUTION OPHTHALMIC at 10:22

## 2021-06-02 RX ADMIN — TETRACAINE HYDROCHLORIDE 1 DROP: 5 SOLUTION OPHTHALMIC at 10:22

## 2021-06-02 NOTE — ANESTHESIA POSTPROCEDURE EVALUATION
Procedure(s):  LEFT CATARACT EXTRACTION WITH INTRA OCULAR LENS IMPLANT.     MAC    Anesthesia Post Evaluation      Multimodal analgesia: multimodal analgesia not used between 6 hours prior to anesthesia start to PACU discharge  Patient location during evaluation: bedside  Patient participation: complete - patient participated  Level of consciousness: awake and alert  Pain score: 0  Pain management: adequate  Airway patency: patent  Anesthetic complications: no  Cardiovascular status: acceptable  Respiratory status: acceptable  Hydration status: acceptable  Post anesthesia nausea and vomiting:  none  Final Post Anesthesia Temperature Assessment:  Normothermia (36.0-37.5 degrees C)      INITIAL Post-op Vital signs:   Vitals Value Taken Time   /62 06/02/21 1111   Temp 36.6 °C (97.8 °F) 06/02/21 1111   Pulse 70 06/02/21 1111   Resp 16 06/02/21 1111   SpO2 100 % 06/02/21 1111

## 2021-06-02 NOTE — OP NOTES
[facility]  OPERATIVE REPORT    Name:  Annelise Teixeira  MR#:  [id number]  :  1945  ACCOUNT #:  [de-identified]  DATE OF SERVICE:  2021    PREOPERATIVE DIAGNOSIS:  Age-related nuclear cataract, left eye. POSTOPERATIVE DIAGNOSIS:  Pseudophakia, left eye    PROCEDURE PERFORMED:  Phacoemulsification of cataract, left eye, with intraocular lens implant. SURGEON:  Ezzie Aschoff, MD    ASSISTANT:  None. ANESTHESIA:  Topical 3.5% lidocaine gel, 0.5% tetracaine drops, IV sedation by Anesthesia, and 2% preservative-free intraocular lidocaine. COMPLICATIONS:  None. SPECIMENS REMOVED:  None. IMPLANTS:  IOL. ESTIMATED BLOOD LOSS:  None. HISTORY:  This 49-year-old white female noted painless progressive decreased visual acuity in both eyes secondary to cataract formation affecting distance and near vision, driving and reading, and not improved by refraction. She presents for an elective extracapsular cataract extraction with lens implant in her left eye to improve vision and lifestyle. In addition to cataracts, she has a history of depression, elevated cholesterol, hypertension, arthritis, vitamin D deficiency, and esophagitis. She has allergy to ACE inhibitors and Lipitor, and her current medications include Norvasc, Tricor, Crestor, Prozac, Cozaar, vitamin D3, and Tylenol. She was cleared for surgery by Dr. Tanvir Holland. Ocular examination at the time of admission reveals her best corrected visual acuity with glare of 20/100 in the right eye and 20/200 in the left eye with intraocular pressures of 12 mmHg both eyes. Extraocular examination reveals full fields to confrontation and normal motility with acne rosacea, ptosis, and exophthalmos. Anterior segment shows normal conjunctivae with mild edema, clear corneas, open angles, deeper chambers. Equal, round, and reactive pupils with normal iris.   She had a 6+ nuclear sclerotic lens changes with early posterior capsular cataract in both eyes. Dilated fundus shows a 0.2 cup-to-disk ratio, normal blood vessels, and dull foveal reflexes. PROCEDURE:  The patient was taken to the main operating room after receiving pupillary dilation, application of Honan balloon and topical anesthesia in the preop area, placed in the supine position and given IV sedation by Anesthesia. The patient was prepped and draped in the standard fashion for intraocular microsurgery of the left eye with a temporal approach. A limbal paracentesis was made with a 15-degree blade and the anterior chamber filled with Viscoat. A rtenton keratome was used to enter the anterior chamber from the temporal limbus in a four-plane fashion creating a 3-mm self-sealing corneal tunnel incision. An anterior capsulorrhexis was performed with capsulorrhexis forceps followed by hydrodissection and hydrodelineation of the nucleus in the capsular bag with balanced salt solution achieving rotation. A CDE of 5.64, phacoemulsification time of 55.6 seconds, at an average power of 3.8% was required to remove the nucleus in a phaco chop technique in burst mode at high vacuum using OZil technology followed by irrigation/aspiration of the remaining cortex and vacuum polishing of the posterior capsule. The capsular bag was then filled with Provisc and an Lux AcrySof posterior chamber foldable acrylic intraocular lens, model SN60WF, power +16.5 diopters, was injected into the capsular bag using an Lux cartridge and the lens centered. Irrigation/aspiration was used to remove the Provisc from the anterior chamber and capsular bag. The anterior chamber was filled with balanced salt solution and the incisions tested and found to be watertight without a suture. A collagen shield soaked in TobraDex ophthalmic drops and tetracaine drops was placed on the cornea followed by Pred-G ophthalmic ointment.   The speculum and drape were then removed and an eye shield taped over the eye. The patient tolerated the procedure well and left the operating room for the step-down unit under the care of Anesthesia, in a satisfactory postop condition, alert and awake. The patient is to be discharged home when released by Anesthesia, to remain at bed rest with head elevated for the next 24 hours, resume all customary preop diet and medications and take Tylenol as needed for discomfort. The patient has a followup appointment in my office on 06/03/2021, at 3:45 p.m.       Leigh Ann Peñaloza MD      [id number]  D:  06/02/2021 11:21  T:  06/02/2021 19:56  JOB #:  [id number]

## 2021-06-02 NOTE — DISCHARGE INSTRUCTIONS
[facility] EYE [facility]      POST OPERATIVE INSTRUCTIONS AND INFORMATION         1. THE FIRST 24 HOURS AFTER SURGERY, YOU WILL BE ASKED TO REMAIN AT BEDREST WITH YOUR HEAD ELEVATED AT 39 DEGREES THIS CAN BE DONE BY SLEEPING ON THE PILLOWS OR IN A RECLINER. YOU CAN GET UP AS NECESSARY. YOUR EYE SHIELD MUST REMAIN FIRMLY IN PLACE FOR THE FIRST 24 HOURS. IT WILL BE REMOVED IN THE OFFICE ON THE DAY FOLLOWING SURGERY WHEN I EXAMINE YOUR EYE. 2.  YOU WILL NEED TO BRING ALL EYE MEDICATIONS TO YOUR APPOINTMENT THE DAY AFTER SURGERY. 3.   YOUR POST OP VISIT SCHEDULE IS AS FOLLOWS:             * ONE DAY AFTER SURGERY             * ONE WEEK AFTER SURGERY             * SIX WEEKS AFTER SURGERY (SPECTACLE REFRACTION AND DILATED EXAMINATION)    4. IT IS IMPORTANT THAT YOU WEAR EYE PROTECTION AT ALL TIMES FOR THE      FIRST WEEK AFTER [facility] CATARACT SURGERY. DURING THE DAY, YOU WILL NEED TO WEAR EITHER OUR CURRENT SPECTACLES WITH A PLAIN WINDOW GLASS LENS OR YOU MAY WISH TO PURCHASE A PAIR OF DRUG STORE BIFOCALS WITH A POWER OF +2.50 OR SO. WHEN OUTSIDE, YOU MUST WEAR THE SOLAR BROWN THAT ARE PROVIDED FOR YOU. AT BEDTIME, YOU MUST WEAR THE EYE SHIELD THAT IS ALSO PROVIDED. AGAIN THIS IS FOR THE FIRST WEEK FOLLOWING YOUR SURGERY. 5. IMPORTANT DOS AND DONTS:             *AVOID CONSTIPATION             *DO NOT PARTICIPATE IN SPORTS OR STRENUOUS PHYSICAL ACTIVITY INCLUDING                         SEXUAL RELATIONS             *DO NOT DRIVE FOR ONE WEEK OFTER EACH CATARACT SURGERY             *AVOID POWDERS, SPRAYS, OR OTHER THINGS THAT MIGHT GET IN YOUR EYES             *DO NOT RUB YOUR EYE OR PUT ANY PRESSURE ON YOUR EYE    6. IF YOUR HAIR IS WASHED BY SOMEONE ELSE, HAVE THEM POSITION YOU SO THAT YOU LEAN YOUR HEAD BACKWARDS INTO THE SINK TO AVOID SOAPY WATER FROM GETTING IN YOUR EYES. YOU SHOULD ALSO WEAR YOUR EYE SHIELD TO PREVENT INJURY OR CONTAMINATION TO THE EYE.             7. DO NOT HESITATE TO CALL OUR OFFICE IF YOU FEEL SOMETHING IS NOT RIGHT OR YOU HAVE ANY QUESTIONS, UNUSUAL SYMPTOMS, OR SUDDEN CHANGES IN YOUR VISION. 8. [facility]              [phone]              *[place] OFFICE         [phone]    9. Samantha [address] YOU FOR ENTRUSTING YOUR EYE CARE TO US.

## 2021-06-02 NOTE — INTERVAL H&P NOTE
Patient Education        Abdominal Pain: Care Instructions  Your Care Instructions     Abdominal pain has many possible causes. Some aren't serious and get better on their own in a few days. Others need more testing and treatment. If your pain continues or gets worse, you need to be rechecked and may need more tests to find out what is wrong. You may need surgery to correct the problem. Don't ignore new symptoms, such as fever, nausea and vomiting, urination problems, pain that gets worse, and dizziness. These may be signs of a more serious problem. Your doctor may have recommended a follow-up visit in the next 8 to 12 hours. If you are not getting better, you may need more tests or treatment. The doctor has checked you carefully, but problems can develop later. If you notice any problems or new symptoms, get medical treatment right away. Follow-up care is a key part of your treatment and safety. Be sure to make and go to all appointments, and call your doctor if you are having problems. It's also a good idea to know your test results and keep a list of the medicines you take. How can you care for yourself at home? · Rest until you feel better. · To prevent dehydration, drink plenty of fluids, enough so that your urine is light yellow or clear like water. Choose water and other caffeine-free clear liquids until you feel better. If you have kidney, heart, or liver disease and have to limit fluids, talk with your doctor before you increase the amount of fluids you drink. · If your stomach is upset, eat mild foods, such as rice, dry toast or crackers, bananas, and applesauce. Try eating several small meals instead of two or three large ones. · Wait until 48 hours after all symptoms have gone away before you have spicy foods, alcohol, and drinks that contain caffeine. · Do not eat foods that are high in fat. · Avoid anti-inflammatory medicines such as aspirin, ibuprofen (Advil, Motrin), and naproxen (Aleve). The patient was counseled at length about the risks of tiffanie Covid-19 during their perioperative period and any recovery window from their procedure. The patient was made aware that tiffanie Covid-19  may worsen their prognosis for recovering from their procedure and lend to a higher morbidity and/or mortality risk. All material risks, benefits, and reasonable alternatives including postponing the procedure were discussed. The patient does  wish to proceed with the procedure at this time. Update History & Physical    The Patient's History and Physical of May 19,   2021 was reviewed with the patient and I examined the patient. There was no change. The surgical site was confirmed by the patient and me. Plan:  The risk, benefits, expected outcome, and alternative to the recommended procedure have been discussed with the patient. Patient understands and wants to proceed with the procedure.     Electronically signed by Jennifer Fuentes MD on 6/2/2021 at 9:46 AM These can cause stomach upset. Talk to your doctor if you take daily aspirin for another health problem. When should you call for help? [id number] anytime you think you may need emergency care. For example, call if:  · You passed out (lost consciousness). · You pass maroon or very bloody stools. · You vomit blood or what looks like coffee grounds. · You have new, severe belly pain. Call your doctor now or seek immediate medical care if:  · Your pain gets worse, especially if it becomes focused in one area of your belly. · You have a new or higher fever. · Your stools are black and look like tar, or they have streaks of blood. · You have unexpected vaginal bleeding. · You have symptoms of a urinary tract infection. These may include:  ? Pain when you urinate. ? Urinating more often than usual.  ? Blood in your urine. · You are dizzy or lightheaded, or you feel like you may faint. Watch closely for changes in your health, and be sure to contact your doctor if:  · You are not getting better after 1 day (24 hours). Where can you learn more? Go to http://anayeli-june.info/  Enter D255 in the search box to learn more about \"Abdominal Pain: Care Instructions. \"  Current as of: June 26, 2019               Content Version: 12.5  © 1597-7642 [employer]. Care instructions adapted under license by [employer] (which disclaims liability or warranty for this information). If you have questions about a medical condition or this instruction, always ask your healthcare professional. [place] [address] any warranty or liability for your use of this information.

## 2021-06-02 NOTE — BRIEF OP NOTE
Brief Postoperative Note    Patient: Adiel Lomeli  YOB: 1945  MRN: [id number]    Date of Procedure: 6/2/2021     Pre-Op Diagnosis: cataracts ou    Post-Op Diagnosis: pseudophakia left eye      Procedure(s):  LEFT CATARACT EXTRACTION WITH INTRA OCULAR LENS IMPLANT    Surgeon(s):  Lisa Sanchez MD    Surgical Assistant: None    Anesthesia: MAC     Estimated Blood Loss (mL): none    Complications: None    Specimens: * No specimens in log *     Implants:   Implant Name Type Inv.  Item Serial No.  Lot No. LRB No. Used Action   ACRYSOF IQ Intraocular Lens  [id number] [address] [place] [address]  Left 1 Implanted       Drains: * No LDAs found *    Findings: cataract    Electronically Signed by Ezzie Aschoff, MD on 6/2/2021 at 11:13 AM

## 2021-06-07 PROBLEM — H25.11 AGE-RELATED NUCLEAR CATARACT, RIGHT EYE: Chronic | Status: ACTIVE | Noted: 2021-06-07

## 2021-06-08 ENCOUNTER — ANESTHESIA EVENT (OUTPATIENT)
Dept: SURGERY | Age: 76
End: 2021-06-08
Payer: MEDICARE

## 2021-06-09 ENCOUNTER — ANESTHESIA (OUTPATIENT)
Dept: SURGERY | Age: 76
End: 2021-06-09
Payer: MEDICARE

## 2021-06-09 ENCOUNTER — HOSPITAL ENCOUNTER (OUTPATIENT)
Age: 76
Setting detail: OUTPATIENT SURGERY
Discharge: HOME | End: 2021-06-09
Attending: OPHTHALMOLOGY | Admitting: OPHTHALMOLOGY
Payer: MEDICARE

## 2021-06-09 VITALS
TEMPERATURE: 97.7 F | HEART RATE: 86 BPM | RESPIRATION RATE: 16 BRPM | BODY MASS INDEX: 26.22 KG/M2 | SYSTOLIC BLOOD PRESSURE: 99 MMHG | DIASTOLIC BLOOD PRESSURE: 63 MMHG | WEIGHT: 148 LBS | OXYGEN SATURATION: 100 % | HEIGHT: 63 IN

## 2021-06-09 PROBLEM — H25.11 AGE-RELATED NUCLEAR CATARACT, RIGHT EYE: Chronic | Status: RESOLVED | Noted: 2021-06-07 | Resolved: 2021-06-09

## 2021-06-09 PROCEDURE — 77030014067 HC TIP PHACO KLMN ALCN -B: Performed by: OPHTHALMOLOGY

## 2021-06-09 PROCEDURE — 74011000250 HC RX REV CODE- 250: Performed by: OPHTHALMOLOGY

## 2021-06-09 PROCEDURE — 76010000154 HC OR TIME FIRST 0.5 HR: Performed by: OPHTHALMOLOGY

## 2021-06-09 PROCEDURE — 74011000250 HC RX REV CODE- 250: Performed by: ANESTHESIOLOGY

## 2021-06-09 PROCEDURE — 76060000031 HC ANESTHESIA FIRST 0.5 HR: Performed by: OPHTHALMOLOGY

## 2021-06-09 PROCEDURE — 74011250636 HC RX REV CODE- 250/636: Performed by: ANESTHESIOLOGY

## 2021-06-09 PROCEDURE — 77030013353: Performed by: OPHTHALMOLOGY

## 2021-06-09 PROCEDURE — 77030035283: Performed by: OPHTHALMOLOGY

## 2021-06-09 PROCEDURE — 77030007855 HC KNF OPHTH IKNF ALCN -A: Performed by: OPHTHALMOLOGY

## 2021-06-09 PROCEDURE — V2632 POST CHMBR INTRAOCULAR LENS: HCPCS | Performed by: OPHTHALMOLOGY

## 2021-06-09 PROCEDURE — 74011250636 HC RX REV CODE- 250/636: Performed by: OPHTHALMOLOGY

## 2021-06-09 PROCEDURE — 77030020828: Performed by: OPHTHALMOLOGY

## 2021-06-09 PROCEDURE — 76210000020 HC REC RM PH II FIRST 0.5 HR: Performed by: OPHTHALMOLOGY

## 2021-06-09 PROCEDURE — 77030013987 HC SLV OPTH IRR ALCN -B: Performed by: OPHTHALMOLOGY

## 2021-06-09 PROCEDURE — 2709999900 HC NON-CHARGEABLE SUPPLY: Performed by: OPHTHALMOLOGY

## 2021-06-09 DEVICE — LENS IOL POST 1-PC 6X13 17.0 -- ACRYSOF: Type: IMPLANTABLE DEVICE | Site: EYE | Status: FUNCTIONAL

## 2021-06-09 RX ORDER — PHENYLEPHRINE HYDROCHLORIDE 100 MG/ML
1 SOLUTION/ DROPS OPHTHALMIC
Status: COMPLETED | OUTPATIENT
Start: 2021-06-09 | End: 2021-06-09

## 2021-06-09 RX ORDER — GLYCOPYRROLATE 0.2 MG/ML
INJECTION INTRAMUSCULAR; INTRAVENOUS AS NEEDED
Status: DISCONTINUED | OUTPATIENT
Start: 2021-06-09 | End: 2021-06-09 | Stop reason: HOSPADM

## 2021-06-09 RX ORDER — LIDOCAINE HYDROCHLORIDE 20 MG/ML
2 INJECTION, SOLUTION EPIDURAL; INFILTRATION; INTRACAUDAL; PERINEURAL ONCE
Status: COMPLETED | OUTPATIENT
Start: 2021-06-09 | End: 2021-06-09

## 2021-06-09 RX ORDER — TETRACAINE HYDROCHLORIDE 5 MG/ML
1 SOLUTION OPHTHALMIC AS NEEDED
Status: COMPLETED | OUTPATIENT
Start: 2021-06-09 | End: 2021-06-09

## 2021-06-09 RX ORDER — PROPOFOL 10 MG/ML
INJECTION, EMULSION INTRAVENOUS AS NEEDED
Status: DISCONTINUED | OUTPATIENT
Start: 2021-06-09 | End: 2021-06-09 | Stop reason: HOSPADM

## 2021-06-09 RX ORDER — KETOROLAC TROMETHAMINE 5 MG/ML
1 SOLUTION OPHTHALMIC
Status: COMPLETED | OUTPATIENT
Start: 2021-06-09 | End: 2021-06-09

## 2021-06-09 RX ORDER — MIDAZOLAM HYDROCHLORIDE 1 MG/ML
INJECTION, SOLUTION INTRAMUSCULAR; INTRAVENOUS AS NEEDED
Status: DISCONTINUED | OUTPATIENT
Start: 2021-06-09 | End: 2021-06-09 | Stop reason: HOSPADM

## 2021-06-09 RX ORDER — TROPICAMIDE 10 MG/ML
1 SOLUTION/ DROPS OPHTHALMIC
Status: COMPLETED | OUTPATIENT
Start: 2021-06-09 | End: 2021-06-09

## 2021-06-09 RX ORDER — SODIUM CHLORIDE, SODIUM LACTATE, POTASSIUM CHLORIDE, CALCIUM CHLORIDE 600; 310; 30; 20 MG/100ML; MG/100ML; MG/100ML; MG/100ML
25 INJECTION, SOLUTION INTRAVENOUS CONTINUOUS
Status: DISCONTINUED | OUTPATIENT
Start: 2021-06-09 | End: 2021-06-09 | Stop reason: HOSPADM

## 2021-06-09 RX ORDER — TOBRAMYCIN AND DEXAMETHASONE 3; 1 MG/ML; MG/ML
1 SUSPENSION/ DROPS OPHTHALMIC AS NEEDED
Status: COMPLETED | OUTPATIENT
Start: 2021-06-09 | End: 2021-06-09

## 2021-06-09 RX ADMIN — TETRACAINE HYDROCHLORIDE 1 DROP: 5 SOLUTION OPHTHALMIC at 10:13

## 2021-06-09 RX ADMIN — TETRACAINE HYDROCHLORIDE 1 DROP: 5 SOLUTION OPHTHALMIC at 09:59

## 2021-06-09 RX ADMIN — MIDAZOLAM 2 MG: 1 INJECTION INTRAMUSCULAR; INTRAVENOUS at 10:21

## 2021-06-09 RX ADMIN — TROPICAMIDE 1 DROP: 10 SOLUTION/ DROPS OPHTHALMIC at 10:00

## 2021-06-09 RX ADMIN — KETOROLAC TROMETHAMINE 1 DROP: 5 SOLUTION OPHTHALMIC at 09:50

## 2021-06-09 RX ADMIN — LIDOCAINE HYDROCHLORIDE 2 DROP: 35 GEL OPHTHALMIC at 10:14

## 2021-06-09 RX ADMIN — PROPOFOL 20 MG: 10 INJECTION, EMULSION INTRAVENOUS at 10:23

## 2021-06-09 RX ADMIN — KETOROLAC TROMETHAMINE 1 DROP: 5 SOLUTION OPHTHALMIC at 10:14

## 2021-06-09 RX ADMIN — TETRACAINE HYDROCHLORIDE 1 DROP: 5 SOLUTION OPHTHALMIC at 09:50

## 2021-06-09 RX ADMIN — KETOROLAC TROMETHAMINE 1 DROP: 5 SOLUTION OPHTHALMIC at 10:00

## 2021-06-09 RX ADMIN — LIDOCAINE HYDROCHLORIDE 2 DROP: 35 GEL OPHTHALMIC at 10:00

## 2021-06-09 RX ADMIN — PHENYLEPHRINE HYDROCHLORIDE 1 DROP: 100 SOLUTION/ DROPS OPHTHALMIC at 10:00

## 2021-06-09 RX ADMIN — PHENYLEPHRINE HYDROCHLORIDE 1 DROP: 100 SOLUTION/ DROPS OPHTHALMIC at 10:14

## 2021-06-09 RX ADMIN — GLYCOPYRROLATE 0.2 MG: 0.2 INJECTION, SOLUTION INTRAMUSCULAR; INTRAVENOUS at 10:23

## 2021-06-09 RX ADMIN — PHENYLEPHRINE HYDROCHLORIDE 1 DROP: 100 SOLUTION/ DROPS OPHTHALMIC at 09:50

## 2021-06-09 RX ADMIN — TROPICAMIDE 1 DROP: 10 SOLUTION/ DROPS OPHTHALMIC at 09:50

## 2021-06-09 RX ADMIN — SODIUM CHLORIDE, POTASSIUM CHLORIDE, SODIUM LACTATE AND CALCIUM CHLORIDE 25 ML/HR: 600; 310; 30; 20 INJECTION, SOLUTION INTRAVENOUS at 09:53

## 2021-06-09 RX ADMIN — TROPICAMIDE 1 DROP: 10 SOLUTION/ DROPS OPHTHALMIC at 10:14

## 2021-06-09 NOTE — DISCHARGE INSTRUCTIONS
[facility] EYE [facility]      POST OPERATIVE INSTRUCTIONS AND INFORMATION         1. THE FIRST 24 HOURS AFTER SURGERY, YOU WILL BE ASKED TO REMAIN AT BEDREST WITH YOUR HEAD ELEVATED AT 39 DEGREES THIS CAN BE DONE BY SLEEPING ON THE PILLOWS OR IN A RECLINER. YOU CAN GET UP AS NECESSARY. YOUR EYE SHIELD MUST REMAIN FIRMLY IN PLACE FOR THE FIRST 24 HOURS. IT WILL BE REMOVED IN THE OFFICE ON THE DAY FOLLOWING SURGERY WHEN I EXAMINE YOUR EYE. 2.  YOU WILL NEED TO BRING ALL EYE MEDICATIONS TO YOUR APPOINTMENT THE DAY AFTER SURGERY. 3.   YOUR POST OP VISIT SCHEDULE IS AS FOLLOWS:             * ONE DAY AFTER SURGERY             * ONE WEEK AFTER SURGERY             * SIX WEEKS AFTER SURGERY (SPECTACLE REFRACTION AND DILATED EXAMINATION)    4. IT IS IMPORTANT THAT YOU WEAR EYE PROTECTION AT ALL TIMES FOR THE      FIRST WEEK AFTER [facility] CATARACT SURGERY. DURING THE DAY, YOU WILL NEED TO WEAR EITHER OUR CURRENT SPECTACLES WITH A PLAIN WINDOW GLASS LENS OR YOU MAY WISH TO PURCHASE A PAIR OF DRUG STORE BIFOCALS WITH A POWER OF +2.50 OR SO. WHEN OUTSIDE, YOU MUST WEAR THE SOLAR BROWN THAT ARE PROVIDED FOR YOU. AT BEDTIME, YOU MUST WEAR THE EYE SHIELD THAT IS ALSO PROVIDED. AGAIN THIS IS FOR THE FIRST WEEK FOLLOWING YOUR SURGERY. 5. IMPORTANT DOS AND DONTS:             *AVOID CONSTIPATION             *DO NOT PARTICIPATE IN SPORTS OR STRENUOUS PHYSICAL ACTIVITY INCLUDING                         SEXUAL RELATIONS             *DO NOT DRIVE FOR ONE WEEK OFTER EACH CATARACT SURGERY             *AVOID POWDERS, SPRAYS, OR OTHER THINGS THAT MIGHT GET IN YOUR EYES             *DO NOT RUB YOUR EYE OR PUT ANY PRESSURE ON YOUR EYE    6. IF YOUR HAIR IS WASHED BY SOMEONE ELSE, HAVE THEM POSITION YOU SO THAT YOU LEAN YOUR HEAD BACKWARDS INTO THE SINK TO AVOID SOAPY WATER FROM GETTING IN YOUR EYES. YOU SHOULD ALSO WEAR YOUR EYE SHIELD TO PREVENT INJURY OR CONTAMINATION TO THE EYE.             7. DO NOT HESITATE TO CALL OUR OFFICE IF YOU FEEL SOMETHING IS NOT RIGHT OR YOU HAVE ANY QUESTIONS, UNUSUAL SYMPTOMS, OR SUDDEN CHANGES IN YOUR VISION. 8. [facility] OFF[facility]              [phone]              *[place] OFFICE         [phone]    9. Samantha 66 YOU FOR ENTRUSTING YOUR EYE CARE TO US.

## 2021-06-09 NOTE — INTERVAL H&P NOTE
The patient was counseled at length about the risks of tiffanie Covid-19 during their perioperative period and any recovery window from their procedure. The patient was made aware that tiffanie Covid-19  may worsen their prognosis for recovering from their procedure and lend to a higher morbidity and/or mortality risk. All material risks, benefits, and reasonable alternatives including postponing the procedure were discussed. The patient does  wish to proceed with the procedure at this time. Update History & Physical    The Patient's History and Physical of May 19,   2021 was reviewed with the patient and I examined the patient. There was no change. The surgical site was confirmed by the patient and me. Plan:  The risk, benefits, expected outcome, and alternative to the recommended procedure have been discussed with the patient. Patient understands and wants to proceed with the procedure.     Electronically signed by Ezzie Aschoff, MD on 6/9/2021 at 9:58 AM

## 2021-06-09 NOTE — BRIEF OP NOTE
Brief Postoperative Note    Patient: Saint Poche  YOB: 1945  MRN: [id number]    Date of Procedure: 6/9/2021     Pre-Op Diagnosis: cataracts ou    Post-Op Diagnosis: pseudophakia right eye      Procedure(s):  RIGHT CATARACT EXTRACTION WITH INTRA OCULAR LENS IMPLANT    Surgeon(s):  Mariza Caceres MD    Surgical Assistant: None    Anesthesia: MAC     Estimated Blood Loss (mL): none    Complications: None    Specimens: * No specimens in log *     Implants:   Implant Name Type Inv.  Item Serial No.  Lot No. LRB No. Used Action   Acryl Sof IQ [id number] Lens Intraocular Lens  [id number] [employer]  Right 1 Implanted       Drains: * No LDAs found *    Findings: cataract    Electronically Signed by Eneida Ortega MD on 6/9/2021 at 10:44 AM

## 2021-06-09 NOTE — ANESTHESIA POSTPROCEDURE EVALUATION
Procedure(s):  RIGHT CATARACT EXTRACTION WITH INTRA OCULAR LENS IMPLANT.     MAC    Anesthesia Post Evaluation      Multimodal analgesia: multimodal analgesia not used between 6 hours prior to anesthesia start to PACU discharge  Patient location during evaluation: bedside  Patient participation: complete - patient participated  Level of consciousness: awake and alert  Pain score: 0  Pain management: adequate  Airway patency: patent  Anesthetic complications: no  Cardiovascular status: acceptable  Respiratory status: acceptable  Hydration status: acceptable  Post anesthesia nausea and vomiting:  none  Final Post Anesthesia Temperature Assessment:  Normothermia (36.0-37.5 degrees C)      INITIAL Post-op Vital signs:   Vitals Value Taken Time   BP 99/63 06/09/21 1040   Temp 36.5 °C (97.7 °F) 06/09/21 1040   Pulse 86 06/09/21 1040   Resp 16 06/09/21 1040   SpO2 100 % 06/09/21 1040

## 2021-06-09 NOTE — OP NOTES
[facility]  OPERATIVE REPORT    Name:  Dave Montes  MR#:  [id number]  :  1945  ACCOUNT #:  [de-identified]  DATE OF SERVICE:  2021    PREOPERATIVE DIAGNOSIS:  Age-related nuclear cataract, right eye. POSTOPERATIVE DIAGNOSIS:  Pseudophakia, right eye. PROCEDURE PERFORMED:  Phacoemulsification of cataract, right eye with intraocular lens implant. SURGEON:  Sandip Butler MD    ASSISTANT:  None. ANESTHESIA:  Topical 3.5% lidocaine gel, 0.5% tetracaine drops, IV sedation by Anesthesia and 2% preservative-free intraocular lidocaine. COMPLICATIONS:  None. ESTIMATED BLOOD LOSS:  None. SPECIMENS REMOVED:  None. IMPLANTS:  IOL. HISTORY:  This 68-year-old white female noted painless progressive decreased visual acuity in both eyes secondary to cataract formation affecting distance and near vision, driving and reading and not improved by refraction. She underwent phacoemulsification of cataract with lens implant in her left eye on 2021 and presents now for an elective extracapsular cataract extraction lens implant in her right eye to improve vision and lifestyle. In addition to cataracts, she has a history of depression, elevated cholesterol, hypertension, arthritis, vitamin D deficiency and esophagitis. She has allergy to ACE inhibitors and Lipitor and her current medications include Norvasc, Tricor, Crestor, Prozac, Cozaar, vitamin D3 and Tylenol. She was cleared for surgery by Dr. Chepe Ontiveros. Ocular examination at the time of admission reveals a best corrected acuity of 20/200 in the right eye and uncorrected acuity of 20/30 in the left eye with intraocular pressures of 13 mmHg in the right eye and 17 mmHg in the left eye. Extraocular examination reveals full fields to confrontation and normal motility with ptosis and acne rosacea with exophthalmos.   Anterior segment shows normal conjunctiva with clear corneas and healed incisions in the left eye.  The anterior chamber angles were open, chambers deep and clear with normal iris and pupil. She has a 6+ nuclear sclerotic lens in the right eye and a posterior chamber IOL in the left eye. Dilated fundus shows a 0.2 cup-to-disk ratio, normal blood vessels and dull foveal reflexes. DESCRIPTION OF PROCEDURE:  The patient was taken to the main operating room after receiving pupillary dilation, application of Honan balloon and topical anesthesia in the preop area, placed in the supine position and given IV sedation by Anesthesia. The patient was prepped and draped in the standard fashion for intraocular microsurgery of the right eye with a temporal approach. A limbal paracentesis was made with a 15-degree blade and the anterior chamber filled with Viscoat. A [place] keratome was used to enter the anterior chamber from the temporal limbus in a four-plane fashion creating a 3 mm self-sealing corneal tunnel incision. An anterior capsulorrhexis was performed with capsulorrhexis forceps followed by hydrodissection and hydrodelineation of the nucleus in the capsular bag with balanced salt solution achieving rotation. A CDE of 7.31, phacoemulsification time of 44.6 seconds, at an average phaco power of 13.1% was required to remove the nucleus in a phaco chop technique in burst mode at high vacuum using OZil technology followed by irrigation/aspiration of the remaining cortex and vacuum polishing of the posterior capsule. The capsular bag was then filled with Provisc and an Lux AcrySof posterior chamber foldable acrylic intraocular lens, model SN60WF, power +16.0 diopters, was injected into the capsular bag using an Lux cartridge and the lens centered. Irrigation/aspiration was used to remove the Provisc from the anterior chamber and capsular bag. The anterior chamber was filled with balanced salt solution and the incisions tested and found to be watertight without a suture.   A collagen shield soaked in Tobradex ophthalmic drops and tetracaine drops was placed on the cornea followed by Pred-G ophthalmic ointment. The speculum and drape were then removed and an eye shield taped over the eye. The patient tolerated the procedure well and left the operating room for the step-down unit under the care of Anesthesia, in a satisfactory postop condition, alert and awake. The patient is to be discharged home when released by Anesthesia, to remain at bed rest with head elevated for the next 24 hours, resume all customary preop diet and medications and take Tylenol as needed for discomfort. The patient has a followup appointment in my office on 06/10 at 02:45 p.m.       Elvia Valerio MD      [id number]  D:  06/09/2021 10:50  T:  06/09/2021 11:30  JOB #:  [id number]

## 2021-10-13 ENCOUNTER — OFFICE VISIT (OUTPATIENT)
Dept: CARDIOLOGY CLINIC | Age: 76
End: 2021-10-13
Payer: MEDICARE

## 2021-10-13 VITALS
HEART RATE: 62 BPM | HEIGHT: 63 IN | BODY MASS INDEX: 26.93 KG/M2 | DIASTOLIC BLOOD PRESSURE: 80 MMHG | WEIGHT: 152 LBS | TEMPERATURE: 98.6 F | RESPIRATION RATE: 18 BRPM | SYSTOLIC BLOOD PRESSURE: 140 MMHG | OXYGEN SATURATION: 98 %

## 2021-10-13 DIAGNOSIS — I44.7 INCOMPLETE LEFT BUNDLE BRANCH BLOCK (LBBB): Primary | ICD-10-CM

## 2021-10-13 DIAGNOSIS — I10 ESSENTIAL HYPERTENSION WITH GOAL BLOOD PRESSURE LESS THAN 140/90: ICD-10-CM

## 2021-10-13 DIAGNOSIS — E78.2 MIXED HYPERLIPIDEMIA: ICD-10-CM

## 2021-10-13 PROCEDURE — 1101F PT FALLS ASSESS-DOCD LE1/YR: CPT | Performed by: INTERNAL MEDICINE

## 2021-10-13 PROCEDURE — G8754 DIAS BP LESS 90: HCPCS | Performed by: INTERNAL MEDICINE

## 2021-10-13 PROCEDURE — G8419 CALC BMI OUT NRM PARAM NOF/U: HCPCS | Performed by: INTERNAL MEDICINE

## 2021-10-13 PROCEDURE — G9717 DOC PT DX DEP/BP F/U NT REQ: HCPCS | Performed by: INTERNAL MEDICINE

## 2021-10-13 PROCEDURE — 99214 OFFICE O/P EST MOD 30 MIN: CPT | Performed by: INTERNAL MEDICINE

## 2021-10-13 PROCEDURE — G8399 PT W/DXA RESULTS DOCUMENT: HCPCS | Performed by: INTERNAL MEDICINE

## 2021-10-13 PROCEDURE — 1090F PRES/ABSN URINE INCON ASSESS: CPT | Performed by: INTERNAL MEDICINE

## 2021-10-13 PROCEDURE — G8536 NO DOC ELDER MAL SCRN: HCPCS | Performed by: INTERNAL MEDICINE

## 2021-10-13 PROCEDURE — G8427 DOCREV CUR MEDS BY ELIG CLIN: HCPCS | Performed by: INTERNAL MEDICINE

## 2021-10-13 PROCEDURE — G8752 SYS BP LESS 140: HCPCS | Performed by: INTERNAL MEDICINE

## 2021-10-13 NOTE — PROGRESS NOTES
Identified pt with two pt identifiers(name and ). Reviewed record in preparation for visit and have obtained necessary documentation. Chief Complaint   Patient presents with    Hypertension     6 month follow up      Vitals:    10/13/21 1135 10/13/21 1143   BP: 108/64 (!) 140/80   Pulse: 62    Resp: 18    Temp: 98.6 °F (37 °C)    TempSrc: Temporal    SpO2: 98%    Weight: 152 lb (68.9 kg)    Height: 5' 3\" (1.6 m)    PainSc:   0 - No pain        Medications reviewed/approved by Dr. Ranjana Hernandez. Health Maintenance Review: Patient reminded of \"due or due soon\" health maintenance. I have asked the patient to contact his/her primary care provider (PCP) for follow-up on his/her health maintenance. Coordination of Care Questionnaire:  :   1) Have you been to an emergency room, urgent care, or hospitalized since your last visit? If yes, where when, and reason for visit? no       2. Have seen or consulted any other health care provider since your last visit? If yes, where when, and reason for visit? NO      Patient is accompanied by self I have received verbal consent from Alena Barrios to discuss any/all medical information while they are present in the room.

## 2021-10-13 NOTE — PROGRESS NOTES
Leisa Beyer is a 64 y.o. female is here for routine f/u. Hx hypertension, dyslipidemia, metabolic syndrome followed at [facility]. No prior known hx CAD. Seen for pre-op clearance for planned cataract sgy, EKG with new (?age) ILBBB. No current CV sx or complaints. Cardiac w/u with Lexiscan MPI 4/21/21--normal perfusion, no infarct/ischemia, LVEF 74%. Echo 4/12/21:  LVEF 65-70, mild MR, otherwise normal.  The patient denies chest pain/ shortness of breath, orthopnea, PND, LE edema, palpitations, syncope, presyncope or fatigue.        Patient Active Problem List    Diagnosis Date Noted    Chronic superficial gastritis without bleeding 04/23/2018    Esophagitis determined by endoscopy 04/23/2018    Diverticula of colon 04/23/2018    Elevated glucose 12/01/2017    Mixed hyperlipidemia 10/21/2016    Essential hypertension with goal blood pressure less than 140/90 07/18/2016    Advance directive discussed with patient 03/17/2016    Vitamin D deficiency 12/07/2015    Depression     Hyperlipidemia     Arthritis     Metabolic syndrome       Apolonia Dickson  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    Depression     Endocrine disorder     not current      Hyperlipidemia     Hypertension     Menopause     Metabolic syndrome     Vitamin D deficiency       Past Surgical History:   Procedure Laterality Date    COLONOSCOPY N/A 4/23/2018    COLONOSCOPY POSSIBLE DILATION performed by Mau Mckeon MD at [facility] 1827 HX BREAST BIOPSY Left     benign    HX COLONOSCOPY      nl, done in [place]    HX COLONOSCOPY  2018    polyp    HX ENDOSCOPY  2018    HX GYN  1996    [facility]     HX [facility]STERECTOMY      age 52   [facility] HX [facility]OPHORECTOMY      DE BREAST SURGERY PROCEDURE UNLISTED  8/2010    Breast BX; negative     Allergies   Allergen Reactions    Ace Inhibitors Cough    Lipitor [Atorvastatin] Myalgia      Family History   Problem Relation Age of Onset    Hypertension Brother     Elevated Lipids Brother     Breast Cancer Maternal Aunt     No Known Problems Mother       Social History     Socioeconomic History    Marital status: SINGLE     Spouse name: Not on file    Number of children: Not on file    Years of education: Not on file    Highest education level: Not on file   Occupational History    Not on file   Tobacco Use    Smoking status: Never Smoker    Smokeless tobacco: Never Used   Substance and Sexual Activity    Alcohol use: Yes     Alcohol/week: 0.0 standard drinks     Comment: social    Drug use: No    Sexual activity: Never     Partners: Male   Other Topics Concern     Service No    Blood Transfusions No    Caffeine Concern No    Occupational Exposure No    Hobby Hazards No    Sleep Concern No    Stress Concern No    Weight Concern No    Special Diet No    Back Care No    Exercise Yes     Comment: goes to [employer] No    Seat Belt Yes    Self-Exams Yes   Social History Narrative    Not on file     Social Determinants of Health     Financial Resource Strain:     Difficulty of Paying Living Expenses:    Food Insecurity:     Worried About Running Out of Food in the Last Year:     [address] in the Last Year:    Transportation Needs:     Lack of Transportation (Medical):      Lack of Transportation (Non-Medical):    Physical Activity:     Days of Exercise per Week:     Minutes of Exercise per Session:    Stress:     Feeling of Stress :    Social Connections:     Frequency of Communication with Friends and Family:     Frequency of Social Gatherings with Friends and Family:     Attends L[employer] Services:     Active Member of Clubs or Organizations:     Attends Club or Organization Meetings:     Marital Status:    Intimate Partner Violence:     Fear of Current or Ex-Partner:     Emotionally Abused:     Physically Abused:     Sexually Abused:       Current Outpatient Medications Medication Sig    losartan (COZAAR) 100 mg tablet TAKE 1 TABLET BY MOUTH ONCE DAILY    fenofibrate nanocrystallized (TRICOR) 145 mg tablet TAKE 1 TABLET BY MOUTH ONCE DAILY    rosuvastatin (CRESTOR) 40 mg tablet TAKE 1 TABLET BY MOUTH ONCE DAILY    FLUoxetine (PROzac) 20 mg capsule TAKE 1 CAPSULE BY MOUTH ONCE DAILY    tobramycin (TOBREX) 0.3 % ophthalmic solution     prednisoLONE acetate (PRED FORTE) 1 % ophthalmic suspension     amLODIPine (NORVASC) 10 mg tablet Take 1 Tab by mouth daily. (Patient taking differently: Take 5 mg by mouth daily.)    cholecalciferol (VITAMIN D3) (2,000 UNITS /50 MCG) cap capsule Take  by mouth daily.  acetaminophen (TYLENOL) 500 mg tablet Take  by mouth every six (6) hours as needed for Pain. No current facility-administered medications for this visit. Review of Symptoms:    CONST  No weight change. No fever, chills, sweats    ENT No visual changes, URI sx, sore throat    CV  See HPI   RESP  No cough, or sputum, wheezing. Also see HPI   GI  No abdominal pain or change in bowel habits. No heartburn or dysphagia. No melena or rectal bleeding.   No dysuria, urgency, frequency, hematuria   MSKEL  No joint pain, swelling. No muscle pain. SKIN  No rash or lesions. NEURO  No headache, syncope, or seizure. No weakness, loss of sensation, or paresthesias. PSYCH  No low mood or depression  No anxiety. HE/LYMPH  No easy bruising, abnormal bleeding, or enlarged glands. Physical ExamPhysical Exam:    Visit Vitals  BP (!) 140/80 (BP 1 Location: Right arm, BP Patient Position: Sitting, BP Cuff Size: Adult)   Pulse 62   Temp 98.6 °F (37 °C) (Temporal)   Resp 18   Ht 5' 3\" (1.6 m)   Wt 152 lb (68.9 kg)   SpO2 98% Comment: ra   BMI 26.93 kg/m²     Gen: NAD  HEENT:  PERRL, throat clear  Neck: no adenopathy, no thyromegaly, no JVD   Heart:  Regular,Nl S1S2,  no murmur, gallop or rub.    Lungs:  clear  Abdomen:   Soft, non-tender, bowel sounds are active.    Extremities:  No edema  Pulse: symmetric  Neuro: A&O times 3, No focal neuro deficits    Labs:   Lab Results   Component Value Date/Time    Sodium 142 05/19/2021 07:59 AM    Sodium 140 03/11/2021 02:04 PM    Sodium 142 12/03/2020 09:12 AM    Sodium 140 06/03/2020 08:34 AM    Sodium 139 01/13/2020 09:48 AM    Potassium 4.9 05/19/2021 07:59 AM    Potassium 4.4 03/11/2021 02:04 PM    Potassium 4.7 12/03/2020 09:12 AM    Potassium 4.4 06/03/2020 08:34 AM    Potassium 4.4 01/13/2020 09:48 AM    Chloride 111 (H) 05/19/2021 07:59 AM    Chloride 108 03/11/2021 02:04 PM    Chloride 105 12/03/2020 09:12 AM    Chloride 103 06/03/2020 08:34 AM    Chloride 103 01/13/2020 09:48 AM    CO2 25 05/19/2021 07:59 AM    CO2 26 03/11/2021 02:04 PM    CO2 20 12/03/2020 09:12 AM    CO2 21 06/03/2020 08:34 AM    CO2 20 01/13/2020 09:48 AM    Anion gap 6 05/19/2021 07:59 AM    Anion gap 6 03/11/2021 02:04 PM    Glucose 92 05/19/2021 07:59 AM    Glucose 101 (H) 03/11/2021 02:04 PM    Glucose 83 12/03/2020 09:12 AM    Glucose 106 (H) 06/03/2020 08:34 AM    Glucose 101 (H) 01/13/2020 09:48 AM    BUN 16 05/19/2021 07:59 AM    BUN 20 03/11/2021 02:04 PM    BUN 14 12/03/2020 09:12 AM    BUN 18 06/03/2020 08:34 AM    BUN 19 01/13/2020 09:48 AM    Creatinine 1.15 (H) 05/19/2021 07:59 AM    Creatinine 1.12 (H) 03/11/2021 02:04 PM    Creatinine 0.95 12/03/2020 09:12 AM    Creatinine 0.90 06/03/2020 08:34 AM    Creatinine 0.98 01/13/2020 09:48 AM    BUN/Creatinine ratio 14 05/19/2021 07:59 AM    BUN/Creatinine ratio 18 03/11/2021 02:04 PM    BUN/Creatinine ratio 15 12/03/2020 09:12 AM    BUN/Creatinine ratio 20 06/03/2020 08:34 AM    BUN/Creatinine ratio 19 01/13/2020 09:48 AM    GFR est AA 55 (L) 05/19/2021 07:59 AM    GFR est AA 57 (L) 03/11/2021 02:04 PM    GFR est AA 67 12/03/2020 09:12 AM    GFR est AA 71 06/03/2020 08:34 AM    GFR est AA 65 01/13/2020 09:48 AM    GFR est non-AA 46 (L) 05/19/2021 07:59 AM    GFR est non-AA 47 (L) 03/11/2021 02:04 PM    GFR est non-AA 58 (L) 12/03/2020 09:12 AM    GFR est non-AA 62 06/03/2020 08:34 AM    GFR est non-AA 56 (L) 01/13/2020 09:48 AM    Calcium 10.0 05/19/2021 07:59 AM    Calcium 9.9 03/11/2021 02:04 PM    Calcium 10.5 (H) 12/03/2020 09:12 AM    Calcium 10.9 (H) 06/03/2020 08:34 AM    Calcium 10.5 (H) 01/13/2020 09:48 AM    Bilirubin, total 0.3 05/19/2021 07:59 AM    Bilirubin, total 0.3 06/03/2020 08:34 AM    Bilirubin, total 0.3 01/13/2020 09:48 AM    Bilirubin, total 0.3 06/12/2018 07:38 AM    Bilirubin, total 0.4 12/01/2017 09:12 AM    Alk. phosphatase 51 05/19/2021 07:59 AM    Alk. phosphatase 44 06/03/2020 08:34 AM    Alk. phosphatase 43 01/13/2020 09:48 AM    Alk. phosphatase 39 06/12/2018 07:38 AM    Alk.  phosphatase 46 12/01/2017 09:12 AM    Protein, total 7.0 05/19/2021 07:59 AM    Protein, total 6.9 06/03/2020 08:34 AM    Protein, total 6.9 01/13/2020 09:48 AM    Protein, total 7.3 06/12/2018 07:38 AM    Protein, total 7.3 12/01/2017 09:12 AM    Albumin 4.0 05/19/2021 07:59 AM    Albumin 4.5 06/03/2020 08:34 AM    Albumin 4.9 (H) 01/13/2020 09:48 AM    Albumin 4.8 06/12/2018 07:38 AM    Albumin 4.6 12/01/2017 09:12 AM    Globulin 3.0 05/19/2021 07:59 AM    A-G Ratio 1.3 05/19/2021 07:59 AM    A-G Ratio 1.9 06/03/2020 08:34 AM    A-G Ratio 2.5 (H) 01/13/2020 09:48 AM    A-G Ratio 1.9 06/12/2018 07:38 AM    A-G Ratio 1.7 12/01/2017 09:12 AM    ALT (SGPT) 21 05/19/2021 07:59 AM    ALT (SGPT) 13 06/03/2020 08:34 AM    ALT (SGPT) 15 01/13/2020 09:48 AM    ALT (SGPT) 24 06/11/2019 09:04 AM    ALT (SGPT) 19 06/12/2018 07:38 AM     No results found for: CPK, CPKX, CPX  Lab Results   Component Value Date/Time    Cholesterol, total 152 06/03/2020 08:34 AM    Cholesterol, total 155 06/11/2019 09:04 AM    Cholesterol, total 154 06/12/2018 07:38 AM    Cholesterol, total 167 12/01/2017 09:12 AM    Cholesterol, total 227 (H) 04/26/2017 10:47 AM    HDL Cholesterol 69 06/03/2020 08:34 AM    HDL Cholesterol 68 06/11/2019 09:04 AM    HDL Cholesterol 66 06/12/2018 07:38 AM    HDL Cholesterol 71 12/01/2017 09:12 AM    HDL Cholesterol 68 04/26/2017 10:47 AM    LDL, calculated 68 06/03/2020 08:34 AM    LDL, calculated 66 06/11/2019 09:04 AM    LDL, calculated 66 06/12/2018 07:38 AM    LDL, calculated 75 12/01/2017 09:12 AM    LDL, calculated 126 (H) 04/26/2017 10:47 AM    Triglyceride 74 06/03/2020 08:34 AM    Triglyceride 106 06/11/2019 09:04 AM    Triglyceride 111 06/12/2018 07:38 AM    Triglyceride 105 12/01/2017 09:12 AM    Triglyceride 164 (H) 04/26/2017 10:47 AM     No results found for this or any previous visit. Assessment:         Patient Active Problem List    Diagnosis Date Noted    Chronic superficial gastritis without bleeding 04/23/2018    Esophagitis determined by endoscopy 04/23/2018    Diverticula of colon 04/23/2018    Elevated glucose 12/01/2017    Mixed hyperlipidemia 10/21/2016    Essential hypertension with goal blood pressure less than 140/90 07/18/2016    Advance directive discussed with patient 03/17/2016    Vitamin D deficiency 12/07/2015    Depression     Hyperlipidemia     Arthritis     Metabolic syndrome       Hx hypertension, dyslipidemia, metabolic syndrome followed at [facility]. No prior known hx CAD. Seen for pre-op clearance for planned cataract sgy, EKG with new (?age) ILBBB. No current CV sx or complaints. Cardiac w/u with Lexiscan MPI 4/21/21--normal perfusion, no infarct/ischemia, LVEF 74%. Echo 4/12/21:  LVEF 65-70, mild MR, otherwise normal. Has significantly lower BP L arm compared to R, no sx related. (previous dopplers neg per pt)     Plan:     Doing well with no adverse cardiac symptoms--continue current meds/rx. Lipids and labs followed by PCP. Continue current care and f/u with PCP.   Can fu here one year    Valeriy Jackson MD

## 2022-02-03 ENCOUNTER — OFFICE VISIT (OUTPATIENT)
Dept: FAMILY MEDICINE CLINIC | Age: 77
End: 2022-02-03
Payer: MEDICARE

## 2022-02-03 VITALS
WEIGHT: 153 LBS | SYSTOLIC BLOOD PRESSURE: 136 MMHG | DIASTOLIC BLOOD PRESSURE: 69 MMHG | HEART RATE: 71 BPM | TEMPERATURE: 97.2 F | OXYGEN SATURATION: 96 % | BODY MASS INDEX: 27.11 KG/M2 | HEIGHT: 63 IN | RESPIRATION RATE: 14 BRPM

## 2022-02-03 DIAGNOSIS — E78.2 MIXED HYPERLIPIDEMIA: ICD-10-CM

## 2022-02-03 DIAGNOSIS — F32.A DEPRESSION, UNSPECIFIED DEPRESSION TYPE: ICD-10-CM

## 2022-02-03 DIAGNOSIS — Z00.00 MEDICARE ANNUAL WELLNESS VISIT, SUBSEQUENT: ICD-10-CM

## 2022-02-03 DIAGNOSIS — I10 ESSENTIAL HYPERTENSION WITH GOAL BLOOD PRESSURE LESS THAN 140/90: Primary | ICD-10-CM

## 2022-02-03 PROBLEM — F33.0 MAJOR DEPRESSIVE DISORDER, RECURRENT, MILD (HCC): Status: ACTIVE | Noted: 2022-02-03

## 2022-02-03 PROCEDURE — G0439 PPPS, SUBSEQ VISIT: HCPCS | Performed by: PHYSICIAN ASSISTANT

## 2022-02-03 PROCEDURE — 99214 OFFICE O/P EST MOD 30 MIN: CPT | Performed by: PHYSICIAN ASSISTANT

## 2022-02-03 RX ORDER — LOSARTAN POTASSIUM 100 MG/1
100 TABLET ORAL DAILY
Qty: 90 TABLET | Refills: 1 | Status: SHIPPED | OUTPATIENT
Start: 2022-02-03 | End: 2022-08-03 | Stop reason: SDUPTHER

## 2022-02-03 RX ORDER — FENOFIBRATE 145 MG/1
145 TABLET, COATED ORAL DAILY
Qty: 90 TABLET | Refills: 1 | Status: SHIPPED | OUTPATIENT
Start: 2022-02-03 | End: 2022-08-03 | Stop reason: SDUPTHER

## 2022-02-03 RX ORDER — AMLODIPINE BESYLATE 5 MG/1
5 TABLET ORAL DAILY
Qty: 90 TABLET | Refills: 1 | Status: SHIPPED | OUTPATIENT
Start: 2022-02-03 | End: 2022-08-03 | Stop reason: SDUPTHER

## 2022-02-03 RX ORDER — ROSUVASTATIN CALCIUM 40 MG/1
40 TABLET, COATED ORAL DAILY
Qty: 90 TABLET | Refills: 1 | Status: SHIPPED | OUTPATIENT
Start: 2022-02-03 | End: 2022-08-03 | Stop reason: SDUPTHER

## 2022-02-03 RX ORDER — FLUOXETINE HYDROCHLORIDE 20 MG/1
20 CAPSULE ORAL DAILY
Qty: 90 CAPSULE | Refills: 1 | Status: SHIPPED | OUTPATIENT
Start: 2022-02-03 | End: 2022-08-03 | Stop reason: SDUPTHER

## 2022-02-03 NOTE — PROGRESS NOTES
Reji Trujillo is a 64 y.o. female who presents to the office today with the following:  Chief Complaint   Patient presents with   Keyanna Wood Annual Wellness Visit         Medication Refill       HPI  HTN  A little high today. Checks at home, gets \"normal\" better than today, but cannot give numbers. No cardiac complaints. No med SEs. Has done well with her fluoxetine for many years. Started when her son was going through divorce back them for major depression. Has been stable, denies any active depression, anxiety or other psych sxs. Also doing well on current cholesterol meds. No AEs. No myalgias. Is fasting for labs. Lab Results   Component Value Date/Time    Cholesterol, total 152 06/03/2020 08:34 AM    HDL Cholesterol 69 06/03/2020 08:34 AM    LDL, calculated 68 06/03/2020 08:34 AM    VLDL, calculated 15 06/03/2020 08:34 AM    Triglyceride 74 06/03/2020 08:34 AM     Lab Results   Component Value Date/Time    ALT (SGPT) 21 05/19/2021 07:59 AM    AST (SGOT) 24 05/19/2021 07:59 AM    Alk. phosphatase 51 05/19/2021 07:59 AM    Bilirubin, total 0.3 05/19/2021 07:59 AM       ROS  See HPI.     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    Depression     Endocrine disorder     not current      Hyperlipidemia     Hypertension     Menopause     Metabolic syndrome     Vitamin D deficiency        Past Surgical History:   Procedure Laterality Date    COLONOSCOPY N/A 4/23/2018    COLONOSCOPY POSSIBLE DILATION performed by Josi Olivera MD at [facility] 1827 HX BREAST BIOPSY Left     benign    HX COLONOSCOPY      nl, done in [place]    HX COLONOSCOPY  2018    polyp    HX ENDOSCOPY  2018    HX GYN  1996    [facility]     HX HYSTERECTOMY      age 52   [de-identified] OOPHORECTOMY      SC BREAST SURGERY PROCEDURE UNLISTED  8/2010    Breast BX; negative       Allergies   Allergen Reactions    Ace Inhibitors Cough    Lipitor [Atorvastatin] Myalgia       Current Outpatient Medications Medication Sig    FLUoxetine (PROzac) 20 mg capsule Take 1 Capsule by mouth daily.  amLODIPine (NORVASC) 5 mg tablet Take 1 Tablet by mouth daily.  losartan (COZAAR) 100 mg tablet Take 1 Tablet by mouth daily.  fenofibrate nanocrystallized (TRICOR) 145 mg tablet Take 1 Tablet by mouth daily.  rosuvastatin (CRESTOR) 40 mg tablet Take 1 Tablet by mouth daily.  cholecalciferol (VITAMIN D3) (2,000 UNITS /50 MCG) cap capsule Take  by mouth daily.  acetaminophen (TYLENOL) 500 mg tablet Take  by mouth every six (6) hours as needed for Pain. No current facility-administered medications for this visit. Social History     Socioeconomic History    Marital status: SINGLE   Tobacco Use    Smoking status: Never Smoker    Smokeless tobacco: Never Used   Substance and Sexual Activity    Alcohol use: Yes     Alcohol/week: 0.0 standard drinks     Comment: social    Drug use: No    Sexual activity: Never     Partners: Male   Other Topics Concern     Service No    Blood Transfusions No    Caffeine Concern No    Occupational Exposure No    Hobby Hazards No    Sleep Concern No    Stress Concern No    Weight Concern No    Special Diet No    Back Care No    Exercise Yes     Comment: goes to [employer] No    Seat Belt Yes    Self-Exams Yes       Family History   Problem Relation Age of Onset    Hypertension Brother     Elevated Lipids Brother     Breast Cancer Maternal Aunt     No Known Problems Mother          Physical Exam:  Visit Vitals  BP (!) 144/84 (BP 1 Location: Left upper arm, BP Patient Position: Sitting)   Pulse 65   Temp 97.2 °F (36.2 °C)   Resp 14   Ht 5' 3\" (1.6 m)   Wt 153 lb (69.4 kg)   SpO2 96%   BMI 27.10 kg/m²     Physical Exam  Vitals and nursing note reviewed. Constitutional:       Appearance: Normal appearance. HENT:      Head: Normocephalic and atraumatic.    Eyes:      Conjunctiva/sclera: Conjunctivae normal.   Cardiovascular:      Rate and Rhythm: Normal rate and regular rhythm. Pulses: Normal pulses. Heart sounds: Normal heart sounds. Pulmonary:      Effort: Pulmonary effort is normal.      Breath sounds: Normal breath sounds. Musculoskeletal:         General: No swelling. Cervical back: Neck supple. Skin:     General: Skin is warm and dry. Neurological:      Mental Status: She is alert and oriented to person, place, and time. Psychiatric:         Mood and Affect: Mood normal.         Assessment/Plan:    ICD-10-CM ICD-9-CM    1. Essential hypertension with goal blood pressure less than 140/90  I10 401.9 losartan (COZAAR) 100 mg tablet      METABOLIC PANEL, BASIC      METABOLIC PANEL, BASIC   2. Depression, unspecified depression type  F32. A 311 FLUoxetine (PROzac) 20 mg capsule   3. Mixed hyperlipidemia  E78.2 272.2 fenofibrate nanocrystallized (TRICOR) 145 mg tablet      rosuvastatin (CRESTOR) 40 mg tablet      LIPID PANEL      LIPID PANEL   4. Medicare annual wellness visit, subsequent  Z00.00 V70.0      Will notify patient of lab results and any further recommendations. Stable on current medications. Pt doing well. Follow-up and Dispositions    · Return in about 6 months (around 8/3/2022). Seek care in interim for any new sxs or other concerns. Pt verbalizes understanding and agrees with the plan.     Juan José Marques PA-C

## 2022-02-03 NOTE — PROGRESS NOTES
1. Have you been to the ER, urgent care clinic since your last visit? Hospitalized since your last visit? No    2. Have you seen or consulted any other health care providers outside of the [address] [place] [address] since your last visit? Include any pap smears or colon screening. No  This is the Subsequent Medicare Annual Wellness Exam, performed 12 months or more after the Initial AWV or the last Subsequent AWV    I have reviewed the patient's medical history in detail and updated the computerized patient record. Assessment/Plan   Education and counseling provided:  Are appropriate based on today's review and evaluation    1. Medicare annual wellness visit, subsequent      Functional Ability:   Does the patient exhibit a steady gait? yes   How long did it take the patient to get up and walk from a sitting position? 2sec   Is the patient self reliant?  (ie can do own laundry, meals, household chores)  yes     Does the patient handle his/her own medications? yes     Does the patient handle his/her own money? yes     Is the patients home safe (ie good lighting, handrails on stairs and bath, etc.)? yes     Did you notice or did patient express any hearing difficulties? no     Did you notice or did patient express any vision difficulties?   no     Were distance and reading eye charts used? no       Advance Care Planning:   Patient was offered the opportunity to discuss advance care planning:  yes     Does patient have an Advance Directive:  yes   If no, did you provide information on Caring Connections?   yes     ADL Assessment 5/19/2021   Feeding yourself No Help Needed   Getting from bed to chair No Help Needed   Getting dressed No Help Needed   Bathing or showering No Help Needed   Walk across the room (includes cane/walker) No Help Needed   Using the telphone No Help Needed   Taking your medications No Help Needed   Preparing meals No Help Needed   Managing money (expenses/bills) No Help Needed Moderately strenuous housework (laundry) No Help Needed   Shopping for personal items (toiletries/medicines) No Help Needed   Shopping for groceries No Help Needed   Driving No Help Needed   Climbing a flight of stairs No Help Needed   Getting to places beyond walking distances No Help Needed       Abuse Screening Questionnaire 10/13/2021   Do you ever feel afraid of your partner? N   Are you in a relationship with someone who physically or mentally threatens you? N   Is it safe for you to go home? Y          Depression Risk Factor Screening     3 most recent PHQ Screens 5/19/2021   PHQ Not Done Active Diagnosis of Depression or Bipolar Disorder   Little interest or pleasure in doing things Not at all   Feeling down, depressed, irritable, or hopeless Not at all   Total Score PHQ 2 0       Alcohol & Drug Abuse Risk Screen    Do you average more than 1 drink per night or more than 7 drinks a week:  No    On any one occasion in the past three months have you have had more than 3 drinks containing alcohol:  No          Functional Ability and Level of Safety    Hearing: Hearing is good. Activities of Daily Living: The home contains: no safety equipment. Patient does total self care      Ambulation: with no difficulty     Fall Risk:  Fall Risk Assessment, last 12 mths 10/13/2021   Able to walk?  Yes   Fall in past 12 months? 0   Do you feel unsteady? -   Are you worried about falling -      Abuse Screen:  Patient is not abused       Cognitive Screening    Has your family/caregiver stated any concerns about your memory: no     Cognitive Screening: na    Health Maintenance Due     Health Maintenance Due   Topic Date Due    Shingrix Vaccine Age 49> (1 of 2) Never done    Lipid Screen  06/03/2021    Flu Vaccine (1) Never done       Patient Care Team   Patient Care Team:  Courtney Calderón as PCP - General (Physician Assistant)  Charline Cloud PA-C as PCP - [facility] Empaneled Provider  Melita Richter MD (Surgery)    History     Patient Active Problem List   Diagnosis Code    Depression F32. A    Hyperlipidemia E78.5    Arthritis G31.54    Metabolic syndrome X43.79    Vitamin D deficiency E55.9    Advance directive discussed with patient Z70.80    Essential hypertension with goal blood pressure less than 140/90 I10    Mixed hyperlipidemia E78.2    Elevated glucose R73.09    Chronic superficial gastritis without bleeding K29.30    Esophagitis determined by endoscopy K20.90    Diverticula of colon K57.30     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    Depression     Endocrine disorder     not current      Hyperlipidemia     Hypertension     Menopause     Metabolic syndrome     Vitamin D deficiency       Past Surgical History:   Procedure Laterality Date    COLONOSCOPY N/A 4/23/2018    COLONOSCOPY POSSIBLE DILATION performed by Curt Mcdowell MD at [facility] 1827 HX BREAST BIOPSY Left     benign    HX COLONOSCOPY      nl, done in [place]    HX COLONOSCOPY  2018    polyp    HX ENDOSCOPY  2018    HX GYN  1996    [facility]     HX HYSTERECTOMY      age 52   [de-identified] OOPHORECTOMY      ID BREAST SURGERY PROCEDURE UNLISTED  8/2010    Breast BX; negative     Current Outpatient Medications   Medication Sig Dispense Refill    FLUoxetine (PROzac) 20 mg capsule TAKE 1 CAPSULE BY MOUTH ONCE DAILY 30 Capsule 0    rosuvastatin (CRESTOR) 40 mg tablet TAKE 1 TABLET BY MOUTH ONCE DAILY 90 Tablet 1    fenofibrate nanocrystallized (TRICOR) 145 mg tablet TAKE 1 TABLET BY MOUTH ONCE DAILY 90 Tablet 1    losartan (COZAAR) 100 mg tablet TAKE 1 TABLET BY MOUTH ONCE DAILY 90 Tablet 1    amLODIPine (NORVASC) 10 mg tablet Take 1 Tab by mouth daily. (Patient taking differently: Take 5 mg by mouth daily.) 90 Tab 1    cholecalciferol (VITAMIN D3) (2,000 UNITS /50 MCG) cap capsule Take  by mouth daily.       acetaminophen (TYLENOL) 500 mg tablet Take  by mouth every six (6) hours as needed for Pain. Allergies   Allergen Reactions    Ace Inhibitors Cough    Lipitor [Atorvastatin] Myalgia       Family History   Problem Relation Age of Onset    Hypertension Brother     Elevated Lipids Brother     Breast Cancer Maternal Aunt     No Known Problems Mother      Social History     Tobacco Use    Smoking status: Never Smoker    Smokeless tobacco: Never Used   Substance Use Topics    Alcohol use:  Yes     Alcohol/week: 0.0 standard drinks     Comment: social Christina Tuttle LPN

## 2022-02-03 NOTE — PATIENT INSTRUCTIONS
Medicare Wellness Visit, Female     The best way to live healthy is to have a lifestyle where you eat a well-balanced diet, exercise regularly, limit alcohol use, and quit all forms of tobacco/nicotine, if applicable. Regular preventive services are another way to keep healthy. Preventive services (vaccines, screening tests, monitoring & exams) can help personalize your care plan, which helps you manage your own care. Screening tests can find health problems at the earliest stages, when they are easiest to treat. [facility] follows the current, evidence-based guidelines published by the [facility] ([facility] [[email or web address]STF) when recommending preventive services for our patients. Because we follow these guidelines, sometimes recommendations change over time as research supports it. (For example, mammograms used to be recommended annually. Even though Medicare will still pay for an annual mammogram, the newer guidelines recommend a mammogram every two years for women of average risk). Of course, you and your doctor may decide to screen more often for some diseases, based on your risk and your co-morbidities (chronic disease you are already diagnosed with). Preventive services for you include:  - Medicare offers their members a free annual wellness visit, which is time for you and your primary care provider to discuss and plan for your preventive service needs. Take advantage of this benefit every year!  -All adults over the age of 72 should receive the recommended pneumonia vaccines. Current USPSTF guidelines recommend a series of two vaccines for the best pneumonia protection.   -All adults should have a flu vaccine yearly and a tetanus vaccine every 10 years.   -All adults age 48 and older should receive the shingles vaccines (series of two vaccines).       -All adults age 38-68 who are overweight should have a diabetes screening test once every three years.   -All adults born between [birth year] and 1967 should be screened once for Hepatitis C.  -Other screening tests and preventive services for persons with diabetes include: an eye exam to screen for diabetic retinopathy, a kidney function test, a foot exam, and stricter control over your cholesterol.   -Cardiovascular screening for adults with routine risk involves an electrocardiogram (ECG) at intervals determined by your doctor.   -Colorectal cancer screenings should be done for adults age 54-65 with no increased risk factors for colorectal cancer. There are a number of acceptable methods of screening for this type of cancer. Each test has its own benefits and drawbacks. Discuss with your doctor what is most appropriate for you during your annual wellness visit. The different tests include: colonoscopy (considered the best screening method), a fecal occult blood test, a fecal DNA test, and sigmoidoscopy.    -A bone mass density test is recommended when a woman turns 65 to screen for osteoporosis. This test is only recommended one time, as a screening. Some providers will use this same test as a disease monitoring tool if you already have osteoporosis. -Breast cancer screenings are recommended every other year for women of normal risk, age 54-69.  -Cervical cancer screenings for women over age 72 are only recommended with certain risk factors. Here is a list of your current Health Maintenance items (your personalized list of preventive services) with a due date:  Health Maintenance Due   Topic Date Due    Shingles Vaccine (1 of 2) Never done    Cholesterol Test   06/03/2021    Yearly Flu Vaccine (1) Never done            DASH Diet: Care Instructions  Your Care Instructions     The DASH diet is an eating plan that can help lower your blood pressure. DASH stands for Dietary Approaches to Stop Hypertension. Hypertension is high blood pressure.   The DASH diet focuses on eating foods that are high in calcium, potassium, and magnesium. These nutrients can lower blood pressure. The foods that are highest in these nutrients are fruits, vegetables, low-fat dairy products, nuts, seeds, and legumes. But taking calcium, potassium, and magnesium supplements instead of eating foods that are high in those nutrients does not have the same effect. The DASH diet also includes whole grains, fish, and poultry. The DASH diet is one of several lifestyle changes your doctor may recommend to lower your high blood pressure. Your doctor may also want you to decrease the amount of sodium in your diet. Lowering sodium while following the DASH diet can lower blood pressure even further than just the DASH diet alone. Follow-up care is a key part of your treatment and safety. Be sure to make and go to all appointments, and call your doctor if you are having problems. It's also a good idea to know your test results and keep a list of the medicines you take. How can you care for yourself at home? Following the DASH diet  · Eat 4 to 5 servings of fruit each day. A serving is 1 medium-sized piece of fruit, ½ cup chopped or canned fruit, 1/4 cup dried fruit, or 4 ounces (½ cup) of fruit juice. Choose fruit more often than fruit juice. · Eat 4 to 5 servings of vegetables each day. A serving is 1 cup of lettuce or raw leafy vegetables, ½ cup of chopped or cooked vegetables, or 4 ounces (½ cup) of vegetable juice. Choose vegetables more often than vegetable juice. · Get 2 to 3 servings of low-fat and fat-free dairy each day. A serving is 8 ounces of milk, 1 cup of yogurt, or 1 ½ ounces of cheese. · Eat 6 to 8 servings of grains each day. A serving is 1 slice of bread, 1 ounce of dry cereal, or ½ cup of cooked rice, pasta, or cooked cereal. Try to choose whole-grain products as much as possible. · Limit lean meat, poultry, and fish to 2 servings each day. A serving is 3 ounces, about the size of a deck of cards.   · Eat 4 to 5 servings of nuts, seeds, and legumes (cooked dried beans, lentils, and split peas) each week. A serving is 1/3 cup of nuts, 2 tablespoons of seeds, or ½ cup of cooked beans or peas. · Limit fats and oils to 2 to 3 servings each day. A serving is 1 teaspoon of vegetable oil or 2 tablespoons of salad dressing. · Limit sweets and added sugars to 5 servings or less a week. A serving is 1 tablespoon jelly or jam, ½ cup sorbet, or 1 cup of lemonade. · Eat less than 2,300 milligrams (mg) of sodium a day. If you limit your sodium to 1,500 mg a day, you can lower your blood pressure even more. · Be aware that all of these are the suggested number of servings for people who eat 1,800 to 2,000 calories a day. Your recommended number of servings may be different if you need more or fewer calories. Tips for success  · Start small. Do not try to make dramatic changes to your diet all at once. You might feel that you are missing out on your favorite foods and then be more likely to not follow the plan. Make small changes, and stick with them. Once those changes become habit, add a few more changes. · Try some of the following:  ? Make it a goal to eat a fruit or vegetable at every meal and at snacks. This will make it easy to get the recommended amount of fruits and vegetables each day. ? Try yogurt topped with fruit and nuts for a snack or healthy dessert. ? Add lettuce, tomato, cucumber, and onion to sandwiches. ? Combine a ready-made pizza crust with low-fat mozzarella cheese and lots of vegetable toppings. Try using tomatoes, squash, spinach, broccoli, carrots, cauliflower, and onions. ? Have a variety of cut-up vegetables with a low-fat dip as an appetizer instead of chips and dip. ? Sprinkle sunflower seeds or chopped almonds over salads. Or try adding chopped walnuts or almonds to cooked vegetables. ? Try some vegetarian meals using beans and peas. Add garbanzo or kidney beans to salads.  Make burritos and tacos with mashed rene beans or black beans.  Where can you learn more? Go to http://www.[employer].com/  Enter H967 in the search box to learn more about \"DASH Diet: Care Instructions. \"  Current as of: April 29, 2021               Content Version: 13.0  © 2006-2021 [employer]. Care instructions adapted under license by [employer] (which disclaims liability or warranty for this information). If you have questions about a medical condition or this instruction, always ask your healthcare professional. [address] any warranty or liability for your use of this information. High Blood Pressure: Care Instructions  Overview     It's normal for blood pressure to go up and down throughout the day. But if it stays up, you have high blood pressure. Another name for high blood pressure is hypertension. Despite what a lot of people think, high blood pressure usually doesn't cause headaches or make you feel dizzy or lightheaded. It usually has no symptoms. But it does increase your risk of stroke, heart attack, and other problems. You and your doctor will talk about your risks of these problems based on your blood pressure. Your doctor will give you a goal for your blood pressure. Your goal will be based on your health and your age. Lifestyle changes, such as eating healthy and being active, are always important to help lower blood pressure. You might also take medicine to reach your blood pressure goal.  Follow-up care is a key part of your treatment and safety. Be sure to make and go to all appointments, and call your doctor if you are having problems. It's also a good idea to know your test results and keep a list of the medicines you take. How can you care for yourself at home? Medical treatment  · If you stop taking your medicine, your blood pressure will go back up. You may take one or more types of medicine to lower your blood pressure. Be safe with medicines.  Take your medicine exactly as prescribed. Call your doctor if you think you are having a problem with your medicine. · Talk to your doctor before you start taking aspirin every day. Aspirin can help certain people lower their risk of a heart attack or stroke. But taking aspirin isn't right for everyone, because it can cause serious bleeding. · See your doctor regularly. You may need to see the doctor more often at first or until your blood pressure comes down. · If you are taking blood pressure medicine, talk to your doctor before you take decongestants or anti-inflammatory medicine, such as ibuprofen. Some of these medicines can raise blood pressure. · Learn how to check your blood pressure at home. Lifestyle changes  · Stay at a healthy weight. This is especially important if you put on weight around the waist. Losing even 10 pounds can help you lower your blood pressure. · If your doctor recommends it, get more exercise. Walking is a good choice. Bit by bit, increase the amount you walk every day. Try for at least 30 minutes on most days of the week. You also may want to swim, bike, or do other activities. · Avoid or limit alcohol. Talk to your doctor about whether you can drink any alcohol. · Try to limit how much sodium you eat to less than 2,300 milligrams (mg) a day. Your doctor may ask you to try to eat less than 1,500 mg a day. · Eat plenty of fruits (such as bananas and oranges), vegetables, legumes, whole grains, and low-fat dairy products. · Lower the amount of saturated fat in your diet. Saturated fat is found in animal products such as milk, cheese, and meat. Limiting these foods may help you lose weight and also lower your risk for heart disease. · Do not smoke. Smoking increases your risk for heart attack and stroke. If you need help quitting, talk to your doctor about stop-smoking programs and medicines. These can increase your chances of quitting for good. When should you call for help?    Call 911  anytime you think you may need emergency care. This may mean having symptoms that suggest that your blood pressure is causing a serious heart or blood vessel problem. Your blood pressure may be over 180/120. For example, call 911 if:    · You have symptoms of a heart attack. These may include:  ? Chest pain or pressure, or a strange feeling in the chest.  ? Sweating. ? Shortness of breath. ? Nausea or vomiting. ? Pain, pressure, or a strange feeling in the back, neck, jaw, or upper belly or in one or both shoulders or arms. ? Lightheadedness or sudden weakness. ? A fast or irregular heartbeat.     · You have symptoms of a stroke. These may include:  ? Sudden numbness, tingling, weakness, or loss of movement in your face, arm, or leg, especially on only one side of your body. ? Sudden vision changes. ? Sudden trouble speaking. ? Sudden confusion or trouble understanding simple statements. ? Sudden problems with walking or balance. ? A sudden, severe headache that is different from past headaches.     · You have severe back or belly pain. Do not wait until your blood pressure comes down on its own. Get help right away. Call your doctor now or seek immediate care if:    · Your blood pressure is much higher than normal (such as 180/120 or higher), but you don't have symptoms.     · You think high blood pressure is causing symptoms, such as:  ? Severe headache.  ? Blurry vision. Watch closely for changes in your health, and be sure to contact your doctor if:    · Your blood pressure measures higher than your doctor recommends at least 2 times. That means the top number is higher or the bottom number is higher, or both.     · You think you may be having side effects from your blood pressure medicine. Where can you learn more? Go to http://www.gray.com/  Enter [id number] in the search box to learn more about \"High Blood Pressure: Care Instructions. \"  Current as of: April 29, 2021               Content Version: 13.0  © 2006-2021 [employer]. Care instructions adapted under license by [employer] (which disclaims liability or warranty for this information). If you have questions about a medical condition or this instruction, always ask your healthcare professional. [address] any warranty or liability for your use of this information. High Cholesterol: Care Instructions  Overview     Cholesterol is a type of fat in your blood. It is needed for many body functions, such as making new cells. Cholesterol is made by your body. It also comes from food you eat. High cholesterol means that you have too much of the fat in your blood. This raises your risk of a heart attack and stroke. LDL and HDL are part of your total cholesterol. LDL is the \"bad\" cholesterol. High LDL can raise your risk for coronary artery disease, heart attack, and stroke. HDL is the \"good\" cholesterol. It helps clear bad cholesterol from the body. High HDL is linked with a lower risk of coronary artery disease, heart attack, and stroke. Your cholesterol levels help your doctor find out your risk for having a heart attack or stroke. You and your doctor can talk about whether you need to lower your risk and what treatment is best for you. Treatment options include a heart-healthy lifestyle and medicine. Both options can help lower your cholesterol and your risk. The way you choose to lower your risk will depend on how high your risk is for heart attack and stroke. It will also depend on how you feel about taking medicines. Follow-up care is a key part of your treatment and safety. Be sure to make and go to all appointments, and call your doctor if you are having problems. It's also a good idea to know your test results and keep a list of the medicines you take. How can you care for yourself at home? · Eat heart-healthy foods.   ? Eat fruits, vegetables, whole grains, beans, and other high-fiber foods. ? Eat lean proteins, such as seafood, lean meats, beans, nuts, and soy products. ? Eat healthy fats, such as canola and olive oil. ? Choose foods that are low in saturated fat. ? Limit sodium and alcohol. ? Limit drinks and foods with added sugar. · Be physically active. Try to do moderate activity at least 2½ hours a week. Or try to do vigorous activity at least 1¼ hours a week. You may want to walk or try other activities, such as running, swimming, cycling, or playing tennis or team sports. · Stay at a healthy weight or lose weight by making the changes in eating and physical activity listed above. Losing just a small amount of weight, even 5 to 10 pounds, can help reduce your risk for having a heart attack or stroke. · Do not smoke. · Manage other health problems. These include diabetes and high blood pressure. If you think you may have a problem with alcohol or drug use, talk to your doctor. · If you take medicine, take it exactly as prescribed. Call your doctor if you think you are having a problem with your medicine. · Check with your doctor or pharmacist before you use any other medicines, including over-the-counter medicines. Make sure your doctor knows all of the medicines, vitamins, herbal products, and supplements you take. Taking some medicines together can cause problems. When should you call for help? Watch closely for changes in your health, and be sure to contact your doctor if:    · You need help making lifestyle changes.     · You have questions about your medicine. Where can you learn more? Go to http://www.gray.com/  Enter O538 in the search box to learn more about \"High Cholesterol: Care Instructions. \"  Current as of: April 29, 2021               Content Version: 13.0  © 0923-2450 [employer].    Care instructions adapted under license by "[employer] (which disclaims liability or warranty for this information). If you have questions about a medical condition or this instruction, always ask your healthcare professional. [place] [address] any warranty or liability for your use of this information.

## 2022-02-04 LAB
ANION GAP SERPL CALC-SCNC: 5 MMOL/L (ref 5–15)
BUN SERPL-MCNC: 16 MG/DL (ref 6–20)
BUN/CREAT SERPL: 16 (ref 12–20)
CALCIUM SERPL-MCNC: 10.3 MG/DL (ref 8.5–10.1)
CHLORIDE SERPL-SCNC: 111 MMOL/L (ref 97–108)
CHOLEST SERPL-MCNC: 163 MG/DL
CO2 SERPL-SCNC: 24 MMOL/L (ref 21–32)
CREAT SERPL-MCNC: 1.01 MG/DL (ref 0.55–1.02)
GLUCOSE SERPL-MCNC: 83 MG/DL (ref 65–100)
HDLC SERPL-MCNC: 81 MG/DL
HDLC SERPL: 2 {RATIO} (ref 0–5)
LDLC SERPL CALC-MCNC: 65.6 MG/DL (ref 0–100)
POTASSIUM SERPL-SCNC: 4.4 MMOL/L (ref 3.5–5.1)
SODIUM SERPL-SCNC: 140 MMOL/L (ref 136–145)
TRIGL SERPL-MCNC: 82 MG/DL (ref ?–150)
VLDLC SERPL CALC-MCNC: 16.4 MG/DL

## 2022-02-04 NOTE — PROGRESS NOTES
I have mailed this patient a letter to include [facility] lab results [facility]

## 2022-02-04 NOTE — PROGRESS NOTES
Notify pt lipids all wnl and other labs are unremarkable, kidney function improved and mostly wnl now, overall stable.

## 2022-02-11 ENCOUNTER — HOSPITAL ENCOUNTER (OUTPATIENT)
Dept: MAMMOGRAPHY | Age: 77
Discharge: HOME | End: 2022-02-11
Attending: PHYSICIAN ASSISTANT
Payer: MEDICARE

## 2022-02-11 DIAGNOSIS — Z12.31 VISIT FOR SCREENING MAMMOGRAM: ICD-10-CM

## 2022-02-11 PROCEDURE — 77063 BREAST TOMOSYNTHESIS BI: CPT

## 2022-03-18 PROBLEM — F33.0 MAJOR DEPRESSIVE DISORDER, RECURRENT, MILD (HCC): Status: ACTIVE | Noted: 2022-02-03

## 2022-03-19 PROBLEM — R73.09 ELEVATED GLUCOSE: Status: ACTIVE | Noted: 2017-12-01

## 2022-03-19 PROBLEM — K29.30 CHRONIC SUPERFICIAL GASTRITIS WITHOUT BLEEDING: Status: ACTIVE | Noted: 2018-04-23

## 2022-03-19 PROBLEM — K20.90 ESOPHAGITIS DETERMINED BY ENDOSCOPY: Status: ACTIVE | Noted: 2018-04-23

## 2022-03-20 PROBLEM — K57.30 DIVERTICULA OF COLON: Status: ACTIVE | Noted: 2018-04-23

## 2022-05-01 NOTE — PROGRESS NOTES
Funmi Dennis is a 64 y.o. female is here for routine f/u. Hx hypertension, dyslipidemia, metabolic syndrome followed at [facility]. No prior known hx CAD. Seen for pre-op clearance for planned cataract sgy, EKG with new (?age) ILBBB. No current CV sx or complaints. Cardiac w/u with Lexiscan MPI 4/21/21--normal perfusion, no infarct/ischemia, LVEF 74%. Echo 4/12/21:  LVEF 65-70, mild MR, otherwise normal.      Last seen by Dr Cata Givens in 10/2021. Doing well from heart standpoint. Does not engage in regular exercise but able does yardwork / housework with no exertional symptoms. The patient denies chest pain/ shortness of breath, orthopnea, PND, LE edema, palpitations, syncope, presyncope or fatigue.        Patient Active Problem List    Diagnosis Date Noted    Major depressive disorder, recurrent, mild 02/03/2022    Chronic superficial gastritis without bleeding 04/23/2018    Esophagitis determined by endoscopy 04/23/2018    Diverticula of colon 04/23/2018    Elevated glucose 12/01/2017    Mixed hyperlipidemia 10/21/2016    Essential hypertension with goal blood pressure less than 140/90 07/18/2016    Advance directive discussed with patient 03/17/2016    Vitamin D deficiency 12/07/2015    Depression     Hyperlipidemia     Arthritis     Metabolic syndrome       Verner Arn  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    Depression     Endocrine disorder     not current      Hyperlipidemia     Hypertension     Menopause     Metabolic syndrome     Vitamin D deficiency       Past Surgical History:   Procedure Laterality Date    COLONOSCOPY N/A 4/23/2018    COLONOSCOPY POSSIBLE DILATION performed by Radha No MD [facility] HX BREAST BIOPSY Left     benign    HX COLONOSCOPY      nl, done in [place]    HX COLONOSCOPY  2018    polyp    HX ENDOSCOPY  2018    HX GYN  1996    MARY ANN & BSO     HX HYSTERECTOMY age 52    HX OOPHORECTOMY      DE BREAST SURGERY PROCEDURE UNLISTED  8/2010    Breast BX; negative     Allergies   Allergen Reactions    Ace Inhibitors Cough    Lipitor [Atorvastatin] Myalgia      Family History   Problem Relation Age of Onset    Hypertension Brother     Elevated Lipids Brother     Breast Cancer Maternal Aunt     No Known Problems Mother       Social History     Socioeconomic History    Marital status: SINGLE     Spouse name: Not on file    Number of children: Not on file    Years of education: Not on file    Highest education level: Not on file   Occupational History    Not on file   Tobacco Use    Smoking status: Never Smoker    Smokeless tobacco: Never Used   Substance and Sexual Activity    Alcohol use: Yes     Alcohol/week: 0.0 standard drinks     Comment: social    Drug use: No    Sexual activity: Never     Partners: Male   Other Topics Concern     Service No    Blood Transfusions No    Caffeine Concern No    Occupational Exposure No    Hobby Hazards No    Sleep Concern No    Stress Concern No    Weight Concern No    Special Diet No    Back Care No    Exercise Yes     Comment: goes to [employer] No    Seat Belt Yes    Self-Exams Yes   Social History Narrative    Not on file     Social Determinants of Health     Financial Resource Strain:     Difficulty of Paying Living Expenses: Not on file   Food Insecurity:     Worried About Running Out of Food in the Last Year: Not on file    Cosme of Food in the Last Year: Not on file   Transportation Needs:     Lack of Transportation (Medical): Not on file    Lack of Transportation (Non-Medical):  Not on file   Physical Activity:     Days of Exercise per Week: Not on file    Minutes of Exercise per Session: Not on file   Stress:     Feeling of Stress : Not on file   Social Connections:     Frequency of Communication with Friends and Family: Not on file    Frequency of Social Gatherings with Friends and Family: Not on file    Attends Sabianist Services: Not on file    Active Member of Clubs or Organizations: Not on file    Attends Club or Organization Meetings: Not on file    Marital Status: Not on file   Intimate Partner Violence:     Fear of Current or Ex-Partner: Not on file    Emotionally Abused: Not on file    Physically Abused: Not on file    Sexually Abused: Not on file   Housing Stability:     Unable to Pay for Housing in the Last Year: Not on file    Number of [place] in the Last Year: Not on file    Unstable Housing in the Last Year: Not on file      Current Outpatient Medications   Medication Sig    FLUoxetine (PROzac) 20 mg capsule Take 1 Capsule by mouth daily.  amLODIPine (NORVASC) 5 mg tablet Take 1 Tablet by mouth daily.  losartan (COZAAR) 100 mg tablet Take 1 Tablet by mouth daily.  fenofibrate nanocrystallized (TRICOR) 145 mg tablet Take 1 Tablet by mouth daily.  rosuvastatin (CRESTOR) 40 mg tablet Take 1 Tablet by mouth daily.  cholecalciferol (VITAMIN D3) (2,000 UNITS /50 MCG) cap capsule Take  by mouth daily.  acetaminophen (TYLENOL) 500 mg tablet Take  by mouth every six (6) hours as needed for Pain. No current facility-administered medications for this visit. Review of Symptoms:    CONST  No weight change. No fever, chills, sweats    ENT No visual changes, URI sx, sore throat    CV  See HPI   RESP  No cough, or sputum, wheezing. Also see HPI   GI  No abdominal pain or change in bowel habits. No heartburn or dysphagia. No melena or rectal bleeding.   No dysuria, urgency, frequency, hematuria   MSKEL  No joint pain, swelling. No muscle pain. SKIN  No rash or lesions. NEURO  No headache, syncope, or seizure. No weakness, loss of sensation, or paresthesias. PSYCH  No low mood or depression  No anxiety. HE/LYMPH  No easy bruising, abnormal bleeding, or enlarged glands.         Physical ExamPhysical Exam:    There were no vitals taken for this visit. Gen: NAD  HEENT:  PERRL, throat clear  Neck: no adenopathy, no thyromegaly, no JVD   Heart:  Regular,Nl S1S2,  no murmur, gallop or rub. Lungs:  clear  Abdomen:   Soft, non-tender, bowel sounds are active.    Extremities:  No edema  Pulse: symmetric  Neuro: A&O times 3, No focal neuro deficits    EKG: SR LBBB    Labs:   Lab Results   Component Value Date/Time    Sodium 140 02/03/2022 08:36 AM    Sodium 142 05/19/2021 07:59 AM    Sodium 140 03/11/2021 02:04 PM    Sodium 142 12/03/2020 09:12 AM    Sodium 140 06/03/2020 08:34 AM    Potassium 4.4 02/03/2022 08:36 AM    Potassium 4.9 05/19/2021 07:59 AM    Potassium 4.4 03/11/2021 02:04 PM    Potassium 4.7 12/03/2020 09:12 AM    Potassium 4.4 06/03/2020 08:34 AM    Chloride 111 (H) 02/03/2022 08:36 AM    Chloride 111 (H) 05/19/2021 07:59 AM    Chloride 108 03/11/2021 02:04 PM    Chloride 105 12/03/2020 09:12 AM    Chloride 103 06/03/2020 08:34 AM    CO2 24 02/03/2022 08:36 AM    CO2 25 05/19/2021 07:59 AM    CO2 26 03/11/2021 02:04 PM    CO2 20 12/03/2020 09:12 AM    CO2 21 06/03/2020 08:34 AM    Anion gap 5 02/03/2022 08:36 AM    Anion gap 6 05/19/2021 07:59 AM    Anion gap 6 03/11/2021 02:04 PM    Glucose 83 02/03/2022 08:36 AM    Glucose 92 05/19/2021 07:59 AM    Glucose 101 (H) 03/11/2021 02:04 PM    Glucose 83 12/03/2020 09:12 AM    Glucose 106 (H) 06/03/2020 08:34 AM    BUN 16 02/03/2022 08:36 AM    BUN 16 05/19/2021 07:59 AM    BUN 20 03/11/2021 02:04 PM    BUN 14 12/03/2020 09:12 AM    BUN 18 06/03/2020 08:34 AM    Creatinine 1.01 02/03/2022 08:36 AM    Creatinine 1.15 (H) 05/19/2021 07:59 AM    Creatinine 1.12 (H) 03/11/2021 02:04 PM    Creatinine 0.95 12/03/2020 09:12 AM    Creatinine 0.90 06/03/2020 08:34 AM    BUN/Creatinine ratio 16 02/03/2022 08:36 AM    BUN/Creatinine ratio 14 05/19/2021 07:59 AM    BUN/Creatinine ratio 18 03/11/2021 02:04 PM    BUN/Creatinine ratio 15 12/03/2020 09:12 AM    BUN/Creatinine ratio 20 06/03/2020 08:34 AM    GFR est AA >60 02/03/2022 08:36 AM    GFR est AA 55 (L) 05/19/2021 07:59 AM    GFR est AA 57 (L) 03/11/2021 02:04 PM    GFR est AA 67 12/03/2020 09:12 AM    GFR est AA 71 06/03/2020 08:34 AM    GFR est non-AA 53 (L) 02/03/2022 08:36 AM    GFR est non-AA 46 (L) 05/19/2021 07:59 AM    GFR est non-AA 47 (L) 03/11/2021 02:04 PM    GFR est non-AA 58 (L) 12/03/2020 09:12 AM    GFR est non-AA 62 06/03/2020 08:34 AM    Calcium 10.3 (H) 02/03/2022 08:36 AM    Calcium 10.0 05/19/2021 07:59 AM    Calcium 9.9 03/11/2021 02:04 PM    Calcium 10.5 (H) 12/03/2020 09:12 AM    Calcium 10.9 (H) 06/03/2020 08:34 AM    Bilirubin, total 0.3 05/19/2021 07:59 AM    Bilirubin, total 0.3 06/03/2020 08:34 AM    Bilirubin, total 0.3 01/13/2020 09:48 AM    Bilirubin, total 0.3 06/12/2018 07:38 AM    Bilirubin, total 0.4 12/01/2017 09:12 AM    Alk. phosphatase 51 05/19/2021 07:59 AM    Alk. phosphatase 44 06/03/2020 08:34 AM    Alk. phosphatase 43 01/13/2020 09:48 AM    Alk. phosphatase 39 06/12/2018 07:38 AM    Alk.  phosphatase 46 12/01/2017 09:12 AM    Protein, total 7.0 05/19/2021 07:59 AM    Protein, total 6.9 06/03/2020 08:34 AM    Protein, total 6.9 01/13/2020 09:48 AM    Protein, total 7.3 06/12/2018 07:38 AM    Protein, total 7.3 12/01/2017 09:12 AM    Albumin 4.0 05/19/2021 07:59 AM    Albumin 4.5 06/03/2020 08:34 AM    Albumin 4.9 (H) 01/13/2020 09:48 AM    Albumin 4.8 06/12/2018 07:38 AM    Albumin 4.6 12/01/2017 09:12 AM    Globulin 3.0 05/19/2021 07:59 AM    A-G Ratio 1.3 05/19/2021 07:59 AM    A-G Ratio 1.9 06/03/2020 08:34 AM    A-G Ratio 2.5 (H) 01/13/2020 09:48 AM    A-G Ratio 1.9 06/12/2018 07:38 AM    A-G Ratio 1.7 12/01/2017 09:12 AM    ALT (SGPT) 21 05/19/2021 07:59 AM    ALT (SGPT) 13 06/03/2020 08:34 AM    ALT (SGPT) 15 01/13/2020 09:48 AM    ALT (SGPT) 24 06/11/2019 09:04 AM    ALT (SGPT) 19 06/12/2018 07:38 AM     No results found for: CPK, CPKX, CPX  Lab Results   Component Value Date/Time Cholesterol, total 163 02/03/2022 08:36 AM    Cholesterol, total 152 06/03/2020 08:34 AM    Cholesterol, total 155 06/11/2019 09:04 AM    Cholesterol, total 154 06/12/2018 07:38 AM    Cholesterol, total 167 12/01/2017 09:12 AM    HDL Cholesterol 81 02/03/2022 08:36 AM    HDL Cholesterol 69 06/03/2020 08:34 AM    HDL Cholesterol 68 06/11/2019 09:04 AM    HDL Cholesterol 66 06/12/2018 07:38 AM    HDL Cholesterol 71 12/01/2017 09:12 AM    LDL, calculated 65.6 02/03/2022 08:36 AM    LDL, calculated 68 06/03/2020 08:34 AM    LDL, calculated 66 06/11/2019 09:04 AM    LDL, calculated 66 06/12/2018 07:38 AM    LDL, calculated 75 12/01/2017 09:12 AM    Triglyceride 82 02/03/2022 08:36 AM    Triglyceride 74 06/03/2020 08:34 AM    Triglyceride 106 06/11/2019 09:04 AM    Triglyceride 111 06/12/2018 07:38 AM    Triglyceride 105 12/01/2017 09:12 AM    CHOL/HDL Ratio 2.0 02/03/2022 08:36 AM     No results found for this or any previous visit. Assessment:         Patient Active Problem List    Diagnosis Date Noted    Major depressive disorder, recurrent, mild 02/03/2022    Chronic superficial gastritis without bleeding 04/23/2018    Esophagitis determined by endoscopy 04/23/2018    Diverticula of colon 04/23/2018    Elevated glucose 12/01/2017    Mixed hyperlipidemia 10/21/2016    Essential hypertension with goal blood pressure less than 140/90 07/18/2016    Advance directive discussed with patient 03/17/2016    Vitamin D deficiency 12/07/2015    Depression     Hyperlipidemia     Arthritis     Metabolic syndrome       Hx hypertension, dyslipidemia, metabolic syndrome followed at [facility]. No prior known hx CAD. Seen for pre-op clearance for planned cataract sgy, EKG with new (?age) ILBBB. No current CV sx or complaints. Cardiac w/u with Lexiscan MPI 4/21/21--normal perfusion, no infarct/ischemia, LVEF 74%.   Echo 4/12/21:  LVEF 65-70, mild MR, otherwise normal. Has significantly lower BP L arm compared to R, no sx related. (previous dopplers neg per pt)     Plan:     Previous cardiac work up  [employer] MPI 4/21/21--normal perfusion, no infarct/ischemia, LVEF 74%. Echo 4/12/21:  LVEF 65-70, mild MR, otherwise normal  New onset LBBB, will repeat echo and bouchra    HTN  Controlled with current therapy  Serum Cr 1.01 in 2/2022  Recall: Has significantly lower BP L arm compared to R, no sx related. (previous dopplers neg per pt)    HLD  2/2022 LDL 65 On rosuva 40 mg daily  Labs and lipids per PCP    Discussed importance of diet and exercise - eventual goal of 30 - 60 minutes, 5-7 times a week as per AHA guideline. Goal of 10 % (15 lbs) weight loss for 6 months.     F/u 6 mos unless testing abormal    Geri Amado, NP

## 2022-05-04 ENCOUNTER — OFFICE VISIT (OUTPATIENT)
Dept: CARDIOLOGY CLINIC | Age: 77
End: 2022-05-04
Payer: MEDICARE

## 2022-05-04 VITALS
TEMPERATURE: 98.5 F | RESPIRATION RATE: 18 BRPM | WEIGHT: 153 LBS | HEART RATE: 77 BPM | OXYGEN SATURATION: 98 % | HEIGHT: 63 IN | SYSTOLIC BLOOD PRESSURE: 130 MMHG | BODY MASS INDEX: 27.11 KG/M2 | DIASTOLIC BLOOD PRESSURE: 80 MMHG

## 2022-05-04 DIAGNOSIS — I44.7 LEFT BUNDLE BRANCH BLOCK: Primary | ICD-10-CM

## 2022-05-04 DIAGNOSIS — R94.31 ABNORMAL EKG: ICD-10-CM

## 2022-05-04 DIAGNOSIS — I10 ESSENTIAL HYPERTENSION WITH GOAL BLOOD PRESSURE LESS THAN 140/90: ICD-10-CM

## 2022-05-04 DIAGNOSIS — E78.2 MIXED HYPERLIPIDEMIA: ICD-10-CM

## 2022-05-04 PROCEDURE — G8536 NO DOC ELDER MAL SCRN: HCPCS | Performed by: NURSE PRACTITIONER

## 2022-05-04 PROCEDURE — 1101F PT FALLS ASSESS-DOCD LE1/YR: CPT | Performed by: NURSE PRACTITIONER

## 2022-05-04 PROCEDURE — G8752 SYS BP LESS 140: HCPCS | Performed by: NURSE PRACTITIONER

## 2022-05-04 PROCEDURE — 99214 OFFICE O/P EST MOD 30 MIN: CPT | Performed by: NURSE PRACTITIONER

## 2022-05-04 PROCEDURE — 93000 ELECTROCARDIOGRAM COMPLETE: CPT | Performed by: INTERNAL MEDICINE

## 2022-05-04 PROCEDURE — 1090F PRES/ABSN URINE INCON ASSESS: CPT | Performed by: NURSE PRACTITIONER

## 2022-05-04 PROCEDURE — G8754 DIAS BP LESS 90: HCPCS | Performed by: NURSE PRACTITIONER

## 2022-05-04 PROCEDURE — G9717 DOC PT DX DEP/BP F/U NT REQ: HCPCS | Performed by: NURSE PRACTITIONER

## 2022-05-04 PROCEDURE — G8419 CALC BMI OUT NRM PARAM NOF/U: HCPCS | Performed by: NURSE PRACTITIONER

## 2022-05-04 PROCEDURE — G8427 DOCREV CUR MEDS BY ELIG CLIN: HCPCS | Performed by: NURSE PRACTITIONER

## 2022-05-04 PROCEDURE — G8399 PT W/DXA RESULTS DOCUMENT: HCPCS | Performed by: NURSE PRACTITIONER

## 2022-05-04 NOTE — PROGRESS NOTES
Identified pt with two pt identifiers(name and ). Reviewed record in preparation for visit and have obtained necessary documentation. Chief Complaint   Patient presents with    Hypertension     6 month follow up      Vitals:    22 1032   BP: 130/80   Pulse: 77   Resp: 18   Temp: 98.5 °F (36.9 °C)   TempSrc: Temporal   SpO2: 98%   Weight: 153 lb (69.4 kg)   Height: 5' 3\" (1.6 m)   PainSc:   0 - No pain       Medications reviewed/approved by provider. Health Maintenance Review: Patient reminded of \"due or due soon\" health maintenance. I have asked the patient to contact his/her primary care provider (PCP) for follow-up on his/her health maintenance. Coordination of Care Questionnaire:  :   1) Have you been to an emergency room, urgent care, or hospitalized since your last visit? If yes, where when, and reason for visit? no       2. Have seen or consulted any other health care provider since your last visit? If yes, where when, and reason for visit? NO      Patient is accompanied by self I have received verbal consent from Sadi Jean to discuss any/all medical information while they are present in the room.

## 2022-05-09 ENCOUNTER — TELEPHONE (OUTPATIENT)
Dept: CARDIOLOGY CLINIC | Age: 77
End: 2022-05-09

## 2022-05-09 NOTE — TELEPHONE ENCOUNTER
----- Message from Pilar Anderson NP sent at 5/4/2022  3:58 PM EDT -----  Pls call patient--      Dr Angus Carranza reviewed her EKG and there are changes from last year---  New onset left bundle branch block which means that  there is A delay or blockage of electrical impulses to the left side of the heart. He wants to repeat an echocardiogram and stress test (lexiscan) to rule out any structural abnormality and or blockages. I have placed both orders in.     Thanks

## 2022-05-09 NOTE — TELEPHONE ENCOUNTER
Pt has been made aware of the ordered cardiology testing. Verbalized understanding and has been scheduled.

## 2022-07-19 ENCOUNTER — TELEPHONE (OUTPATIENT)
Dept: NON INVASIVE DIAGNOSTICS | Age: 77
End: 2022-07-19

## 2022-07-21 ENCOUNTER — HOSPITAL ENCOUNTER (OUTPATIENT)
Dept: NUCLEAR MEDICINE | Age: 77
Discharge: HOME | End: 2022-07-21
Attending: NURSE PRACTITIONER
Payer: MEDICARE

## 2022-07-21 ENCOUNTER — HOSPITAL ENCOUNTER (OUTPATIENT)
Dept: ULTRASOUND IMAGING | Age: 77
Discharge: HOME | End: 2022-07-21
Attending: NURSE PRACTITIONER
Payer: MEDICARE

## 2022-07-21 ENCOUNTER — HOSPITAL ENCOUNTER (OUTPATIENT)
Dept: NON INVASIVE DIAGNOSTICS | Age: 77
Discharge: HOME | End: 2022-07-21
Attending: NURSE PRACTITIONER
Payer: MEDICARE

## 2022-07-21 DIAGNOSIS — I10 ESSENTIAL HYPERTENSION WITH GOAL BLOOD PRESSURE LESS THAN 140/90: ICD-10-CM

## 2022-07-21 DIAGNOSIS — R94.31 ABNORMAL EKG: ICD-10-CM

## 2022-07-21 DIAGNOSIS — E78.2 MIXED HYPERLIPIDEMIA: ICD-10-CM

## 2022-07-21 DIAGNOSIS — I44.7 LEFT BUNDLE BRANCH BLOCK: ICD-10-CM

## 2022-07-21 PROCEDURE — 74011250636 HC RX REV CODE- 250/636: Performed by: NURSE PRACTITIONER

## 2022-07-21 PROCEDURE — 93017 CV STRESS TEST TRACING ONLY: CPT

## 2022-07-21 PROCEDURE — 93306 TTE W/DOPPLER COMPLETE: CPT

## 2022-07-21 RX ORDER — TETRAKIS(2-METHOXYISOBUTYLISOCYANIDE)COPPER(I) TETRAFLUOROBORATE 1 MG/ML
33 INJECTION, POWDER, LYOPHILIZED, FOR SOLUTION INTRAVENOUS
Status: COMPLETED | OUTPATIENT
Start: 2022-07-21 | End: 2022-07-21

## 2022-07-21 RX ORDER — TETRAKIS(2-METHOXYISOBUTYLISOCYANIDE)COPPER(I) TETRAFLUOROBORATE 1 MG/ML
11 INJECTION, POWDER, LYOPHILIZED, FOR SOLUTION INTRAVENOUS
Status: COMPLETED | OUTPATIENT
Start: 2022-07-21 | End: 2022-07-21

## 2022-07-21 RX ADMIN — TETRAKIS(2-METHOXYISOBUTYLISOCYANIDE)COPPER(I) TETRAFLUOROBORATE 11 MILLICURIE: 1 INJECTION, POWDER, LYOPHILIZED, FOR SOLUTION INTRAVENOUS at 09:05

## 2022-07-21 RX ADMIN — REGADENOSON 0.4 MG: 0.08 INJECTION, SOLUTION INTRAVENOUS at 10:57

## 2022-07-21 RX ADMIN — TETRAKIS(2-METHOXYISOBUTYLISOCYANIDE)COPPER(I) TETRAFLUOROBORATE 33 MILLICURIE: 1 INJECTION, POWDER, LYOPHILIZED, FOR SOLUTION INTRAVENOUS at 10:58

## 2022-07-22 LAB
STRESS BASELINE DIAS BP: 75 MMHG
STRESS BASELINE HR: 57 BPM
STRESS BASELINE SYS BP: 168 MMHG
STRESS ESTIMATED WORKLOAD: 1 METS
STRESS EXERCISE DUR MIN: 7 MIN
STRESS EXERCISE DUR SEC: 27 SEC
STRESS PEAK DIAS BP: 71 MMHG
STRESS PEAK SYS BP: NORMAL MMHG
STRESS PERCENT HR ACHIEVED: 60 %
STRESS POST PEAK HR: 85 BPM
STRESS TARGET HR: 141 BPM

## 2022-07-24 LAB
ECHO AO ROOT DIAM: 2.6 CM
ECHO AV PEAK GRADIENT: 10 MMHG
ECHO AV PEAK VELOCITY: 1.6 M/S
ECHO LA DIAMETER: 4 CM
ECHO LA TO AORTIC ROOT RATIO: 1.54
ECHO LA VOL 2C: 62 ML (ref 22–52)
ECHO LA VOL 4C: 56 ML (ref 22–52)
ECHO LA VOLUME AREA LENGTH: 64 ML
ECHO LV E' SEPTAL VELOCITY: 7 CM/S
ECHO LV EDV A2C: 115 ML
ECHO LV EDV A4C: 123 ML
ECHO LV EDV BP: 120 ML (ref 56–104)
ECHO LV EJECTION FRACTION A2C: 53 %
ECHO LV EJECTION FRACTION A4C: 39 %
ECHO LV EJECTION FRACTION BIPLANE: 45 % (ref 55–100)
ECHO LV ESV A2C: 54 ML
ECHO LV ESV A4C: 75 ML
ECHO LV ESV BP: 66 ML (ref 19–49)
ECHO LV FRACTIONAL SHORTENING: 28 % (ref 28–44)
ECHO LV INTERNAL DIMENSION DIASTOLIC: 5.4 CM (ref 3.9–5.3)
ECHO LV INTERNAL DIMENSION SYSTOLIC: 3.9 CM
ECHO LV IVSD: 1.2 CM (ref 0.6–0.9)
ECHO LV MASS 2D: 264.4 G (ref 67–162)
ECHO LV POSTERIOR WALL DIASTOLIC: 1.2 CM (ref 0.6–0.9)
ECHO LV RELATIVE WALL THICKNESS RATIO: 0.44
ECHO LVOT AREA: 4.5 CM2
ECHO LVOT DIAM: 2.4 CM
ECHO MV A VELOCITY: 1.21 M/S
ECHO MV E DECELERATION TIME (DT): 240.8 MS
ECHO MV E VELOCITY: 0.86 M/S
ECHO MV E/A RATIO: 0.71
ECHO MV E/E' SEPTAL: 12.29
ECHO MV REGURGITANT ALIASING (NYQUIST) VELOCITY: 30 CM/S
ECHO MV REGURGITANT RADIUS PISA: 0.6 CM
ECHO MV REGURGITANT VELOCITY PISA: 4.6 M/S
ECHO MV REGURGITANT VTIA: 205.3 CM

## 2022-07-24 PROCEDURE — 93306 TTE W/DOPPLER COMPLETE: CPT | Performed by: INTERNAL MEDICINE

## 2022-07-25 NOTE — PROGRESS NOTES
Echo showed normal pumping heart strength,  mild-mod leakage or stiffness across mitral valve but stable.   Will await stress test

## 2022-07-25 NOTE — PROGRESS NOTES
Please call the patient and inform that stress test is normal; low concern for significant blockages in the heart arteries.   Will see her in November as scheduled, sooner if she has cardiac symptoms

## 2022-07-26 ENCOUNTER — TELEPHONE (OUTPATIENT)
Dept: CARDIOLOGY CLINIC | Age: 77
End: 2022-07-26

## 2022-07-26 NOTE — TELEPHONE ENCOUNTER
----- Message from Ronnell Duque NP sent at 7/25/2022  7:55 AM EDT -----  Echo showed normal pumping heart strength,  mild-mod leakage or stiffness across mitral valve but stable.   Will await stress test

## 2022-07-26 NOTE — TELEPHONE ENCOUNTER
----- Message from Clayton Teixeira NP sent at 7/25/2022  7:57 AM EDT -----  Please call the patient and inform that stress test is normal; low concern for significant blockages in the heart arteries. Will see her in November as scheduled, sooner if she has cardiac symptoms    Spoke with the patient. Verified patient with two patient identifiers. Results given and questions answered. Patient verbalized understanding.

## 2022-08-03 ENCOUNTER — OFFICE VISIT (OUTPATIENT)
Dept: FAMILY MEDICINE CLINIC | Age: 77
End: 2022-08-03
Payer: MEDICARE

## 2022-08-03 VITALS
HEART RATE: 63 BPM | TEMPERATURE: 97.3 F | WEIGHT: 148.2 LBS | SYSTOLIC BLOOD PRESSURE: 121 MMHG | DIASTOLIC BLOOD PRESSURE: 73 MMHG | RESPIRATION RATE: 16 BRPM | BODY MASS INDEX: 26.26 KG/M2 | HEIGHT: 63 IN | OXYGEN SATURATION: 98 %

## 2022-08-03 DIAGNOSIS — I10 ESSENTIAL HYPERTENSION WITH GOAL BLOOD PRESSURE LESS THAN 140/90: Primary | ICD-10-CM

## 2022-08-03 DIAGNOSIS — E78.2 MIXED HYPERLIPIDEMIA: ICD-10-CM

## 2022-08-03 DIAGNOSIS — F32.A DEPRESSION, UNSPECIFIED DEPRESSION TYPE: ICD-10-CM

## 2022-08-03 PROCEDURE — 99214 OFFICE O/P EST MOD 30 MIN: CPT | Performed by: PHYSICIAN ASSISTANT

## 2022-08-03 PROCEDURE — 36415 COLL VENOUS BLD VENIPUNCTURE: CPT | Performed by: PHYSICIAN ASSISTANT

## 2022-08-03 RX ORDER — LOSARTAN POTASSIUM 100 MG/1
100 TABLET ORAL DAILY
Qty: 90 TABLET | Refills: 1 | Status: SHIPPED | OUTPATIENT
Start: 2022-08-03

## 2022-08-03 RX ORDER — FLUOXETINE HYDROCHLORIDE 20 MG/1
20 CAPSULE ORAL DAILY
Qty: 90 CAPSULE | Refills: 1 | Status: SHIPPED | OUTPATIENT
Start: 2022-08-03

## 2022-08-03 RX ORDER — ROSUVASTATIN CALCIUM 40 MG/1
40 TABLET, COATED ORAL DAILY
Qty: 90 TABLET | Refills: 1 | Status: SHIPPED | OUTPATIENT
Start: 2022-08-03

## 2022-08-03 RX ORDER — AMLODIPINE BESYLATE 5 MG/1
5 TABLET ORAL DAILY
Qty: 90 TABLET | Refills: 1 | Status: SHIPPED | OUTPATIENT
Start: 2022-08-03

## 2022-08-03 RX ORDER — FENOFIBRATE 145 MG/1
145 TABLET, COATED ORAL DAILY
Qty: 90 TABLET | Refills: 1 | Status: SHIPPED | OUTPATIENT
Start: 2022-08-03

## 2022-08-03 NOTE — PROGRESS NOTES
Elizabeth Leggett is a 76 y.o. female who presents to the office today with the following:  Chief Complaint   Patient presents with    Follow-up     6 month follow up of BP       Follow-up  The history is provided by the Patient. This is a chronic problem. Known HTN  Checks at home but cannot recall exact readings. Says she knows when its \"good or bad\" and has been wnl per pt. No cardiac complaints. No med SEs. Is followed by cardiology. Reports just having a negative stress test.     Depression  Controlled for years on fluoxetine. No psych complaints. Vit D def  Lab Results   Component Value Date/Time    VITAMIN D, 25-HYDROXY 33.2 12/03/2020 09:12 AM    Taking 1000IU daily. No fatigue or other symptoms. Hyperlipidemia  Lab Results   Component Value Date/Time    Cholesterol, total 163 02/03/2022 08:36 AM    HDL Cholesterol 81 02/03/2022 08:36 AM    LDL, calculated 65.6 02/03/2022 08:36 AM    VLDL, calculated 16.4 02/03/2022 08:36 AM    Triglyceride 82 02/03/2022 08:36 AM    CHOL/HDL Ratio 2.0 02/03/2022 08:36 AM   No myalgias. Doing well on her cholesterol medication, both crestor and tricor. Lab Results   Component Value Date/Time    ALT (SGPT) 21 05/19/2021 07:59 AM    AST (SGOT) 24 05/19/2021 07:59 AM    Alk. phosphatase 51 05/19/2021 07:59 AM    Bilirubin, total 0.3 05/19/2021 07:59 AM     Counseled on Shingrix, recommended she get at her local pharmacy. Pt otherwise reports feeling well with no other complaints or acute concerns. ROS  See HPI.     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    Depression     Endocrine disorder     not current      Hyperlipidemia     Hypertension     Menopause     Metabolic syndrome     Vitamin D deficiency        Past Surgical History:   Procedure Laterality Date    COLONOSCOPY N/A 4/23/2018    COLONOSCOPY POSSIBLE DILATION performed by Julee Murdock MD at [facility]    HX BREAST BIOPSY Left     benign    HX COLONOSCOPY      nl, done in [address]  2018    polyp    HX ENDOSCOPY  2018    HX GYN  1996    MARY ANN & BSO     HX HYSTERECTOMY      age 52    HX OOPHORECTOMY      MO BREAST SURGERY PROCEDURE UNLISTED  8/2010    Breast BX; negative       Allergies   Allergen Reactions    Ace Inhibitors Cough    Lipitor [Atorvastatin] Myalgia       Current Outpatient Medications   Medication Sig    FLUoxetine (PROzac) 20 mg capsule Take 1 Capsule by mouth in the morning. losartan (COZAAR) 100 mg tablet Take 1 Tablet by mouth in the morning. rosuvastatin (CRESTOR) 40 mg tablet Take 1 Tablet by mouth in the morning. fenofibrate nanocrystallized (TRICOR) 145 mg tablet Take 1 Tablet by mouth in the morning. amLODIPine (NORVASC) 5 mg tablet Take 1 Tablet by mouth in the morning. cholecalciferol (VITAMIN D3) (2,000 UNITS /50 MCG) cap capsule Take  by mouth daily. acetaminophen (TYLENOL) 500 mg tablet Take  by mouth every six (6) hours as needed for Pain. No current facility-administered medications for this visit. Social History     Socioeconomic History    Marital status: SINGLE   Tobacco Use    Smoking status: Never    Smokeless tobacco: Never   Substance and Sexual Activity    Alcohol use:  Yes     Alcohol/week: 0.0 standard drinks     Comment: social    Drug use: No    Sexual activity: Never     Partners: Male   Other Topics Concern     Service No    Blood Transfusions No    Caffeine Concern No    Occupational Exposure No    Hobby Hazards No    Sleep Concern No    Stress Concern No    Weight Concern No    Special Diet No    Back Care No    Exercise Yes     Comment: goes to [employer] No    Seat Belt Yes    Self-Exams Yes       Family History   Problem Relation Age of Onset    Hypertension Brother     Elevated Lipids Brother     Breast Cancer Maternal Aunt     No Known Problems Mother          Physical Exam:  Visit Vitals  /73 (BP 1 Location: Left upper arm, BP Patient Position: Sitting, BP Cuff Size: Adult)   Pulse 63   Temp 97.3 °F (36.3 °C) (Temporal)   Resp 16   Ht 5' 3\" (1.6 m)   Wt 148 lb 3.2 oz (67.2 kg)   SpO2 98%   BMI 26.25 kg/m²     Physical Exam  Vitals and nursing note reviewed. Constitutional:       Appearance: Normal appearance. HENT:      Head: Normocephalic and atraumatic. Cardiovascular:      Rate and Rhythm: Normal rate and regular rhythm. Pulses: Normal pulses. Heart sounds: Normal heart sounds. Pulmonary:      Effort: Pulmonary effort is normal.      Breath sounds: Normal breath sounds. Musculoskeletal:         General: No swelling. Skin:     General: Skin is warm and dry. Neurological:      Mental Status: She is alert and oriented to person, place, and time. Psychiatric:         Mood and Affect: Mood normal.       Assessment/Plan:    ICD-10-CM ICD-9-CM    1. Depression, unspecified depression type  F32. A 311 FLUoxetine (PROzac) 20 mg capsule      METABOLIC PANEL, COMPREHENSIVE      METABOLIC PANEL, COMPREHENSIVE      2. Essential hypertension with goal blood pressure less than 140/90  I10 401.9 losartan (COZAAR) 100 mg tablet      METABOLIC PANEL, COMPREHENSIVE      amLODIPine (NORVASC) 5 mg tablet      METABOLIC PANEL, COMPREHENSIVE      ID HANDLG&/OR CONVEY OF SPEC FOR TR OFFICE TO LAB      COLLECTION VENOUS BLOOD,VENIPUNCTURE      3. Mixed hyperlipidemia  E78.2 272.2 rosuvastatin (CRESTOR) 40 mg tablet      fenofibrate nanocrystallized (TRICOR) 145 mg tablet      METABOLIC PANEL, COMPREHENSIVE      METABOLIC PANEL, COMPREHENSIVE        Pt doing well on current regimen. Will continue current medications. Will notify patient of lab results and any further recommendations. Follow-up and Dispositions    Return in about 6 months (around 2/3/2023). Seek care in interim for any new sxs or other concerns. Pt verbalizes understanding and agrees with the plan.     Radha Coelho PA-C

## 2022-08-03 NOTE — PROGRESS NOTES
1. \"Have you been to the ER, urgent care clinic since your last visit? Hospitalized since your last visit? \" No    2. \"Have you seen or consulted any other health care providers outside of the [address] [place] [address] since your last visit? \" No     3. For patients aged 39-70: Has the patient had a colonoscopy / FIT/ Cologuard? Yes - no Care Gap present      If the patient is female:    4. For patients aged 41-77: Has the patient had a mammogram within the past 2 years? Yes - no Care Gap present      5. For patients aged 21-65: Has the patient had a pap smear?  Yes - no Care Gap present

## 2022-08-04 LAB
ALBUMIN SERPL-MCNC: 3.9 G/DL (ref 3.5–5)
ALBUMIN/GLOB SERPL: 1.2 {RATIO} (ref 1.1–2.2)
ALP SERPL-CCNC: 43 U/L (ref 45–117)
ALT SERPL-CCNC: 20 U/L (ref 12–78)
ANION GAP SERPL CALC-SCNC: 7 MMOL/L (ref 5–15)
AST SERPL-CCNC: 16 U/L (ref 15–37)
BILIRUB SERPL-MCNC: 0.4 MG/DL (ref 0.2–1)
BUN SERPL-MCNC: 22 MG/DL (ref 6–20)
BUN/CREAT SERPL: 21 (ref 12–20)
CALCIUM SERPL-MCNC: 10.3 MG/DL (ref 8.5–10.1)
CHLORIDE SERPL-SCNC: 109 MMOL/L (ref 97–108)
CO2 SERPL-SCNC: 24 MMOL/L (ref 21–32)
CREAT SERPL-MCNC: 1.06 MG/DL (ref 0.55–1.02)
GLOBULIN SER CALC-MCNC: 3.3 G/DL (ref 2–4)
GLUCOSE SERPL-MCNC: 89 MG/DL (ref 65–100)
POTASSIUM SERPL-SCNC: 4.6 MMOL/L (ref 3.5–5.1)
PROT SERPL-MCNC: 7.2 G/DL (ref 6.4–8.2)
SODIUM SERPL-SCNC: 140 MMOL/L (ref 136–145)

## 2022-08-04 NOTE — PROGRESS NOTES
Notify pt labs overall appear stable. Her kidney tests go from slightly elevated to normal limits, she should make sure she drinks enough water and avoids taking nsaids often. A few electrolytes are borderline, but stable overtime. Will continue to monitor. The rest of her labs look okay. I recommend routine 6 month f/up.

## 2022-08-05 NOTE — PROGRESS NOTES
I have called and talked to this patient. I have verified the patient's name and . I have discussed the lab results and recommendations from ZOE Guzman. Patient verbalizes understanding at this time. Call transferred to the PSR's to make a 6 month apt.

## 2022-11-07 NOTE — PROGRESS NOTES
Todd Muñoz is a 76 y.o. female is here for routine f/u. Hx hypertension, dyslipidemia, metabolic syndrome followed at [facility]. No prior known hx CAD. Seen for pre-op clearance for planned cataract sgy, EKG with new (?age) ILBBB. No current CV sx or complaints. Cardiac w/u with Lexiscan MPI 4/21/21--normal perfusion, no infarct/ischemia, LVEF 74%. Echo 4/12/21:  LVEF 65-70, mild MR, otherwise normal.      Last seen by Dr Simin Perez in 10/2021. Seen by me in 5/2022. Normal NST 7/2022. Normal EF mild-mod MR per echo 7/2022. Testing obtained due to new onset LBBB    Today  Doing well from heart standpoint. Does not engage in regular exercise but able does yardwork / housework with no exertional symptoms. Recently started walking,  doing fine. The patient denies chest pain/ shortness of breath, orthopnea, PND, LE edema, palpitations, syncope, presyncope or fatigue.        Patient Active Problem List    Diagnosis Date Noted    Major depressive disorder, recurrent, mild 02/03/2022    Chronic superficial gastritis without bleeding 04/23/2018    Esophagitis determined by endoscopy 04/23/2018    Diverticula of colon 04/23/2018    Elevated glucose 12/01/2017    Mixed hyperlipidemia 10/21/2016    Essential hypertension with goal blood pressure less than 140/90 07/18/2016    Advance directive discussed with patient 03/17/2016    Vitamin D deficiency 12/07/2015    Depression     Hyperlipidemia     Arthritis     Metabolic syndrome       Katherine Vicente  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    Depression     Endocrine disorder     not current      Hyperlipidemia     Hypertension     Menopause     Metabolic syndrome     Vitamin D deficiency       Past Surgical History:   Procedure Laterality Date    COLONOSCOPY N/A 4/23/2018    COLONOSCOPY POSSIBLE DILATION performed by Efren Drew MD at [address] [facility] Left     benign HX COLONOSCOPY      nl, done in [place]    HX COLONOSCOPY  2018    polyp    HX ENDOSCOPY  2018    HX GYN  1996    MARY ANN & BSO     HX HYSTERECTOMY      age 52    HX OOPHORECTOMY      [place] BREAST SURGERY PROCEDURE UNLISTED  8/2010    Breast BX; negative     Allergies   Allergen Reactions    Ace Inhibitors Cough    Lipitor [Atorvastatin] Myalgia      Family History   Problem Relation Age of Onset    Hypertension Brother     Elevated Lipids Brother     Breast Cancer Maternal Aunt     No Known Problems Mother       Social History     Socioeconomic History    Marital status: SINGLE     Spouse name: Not on file    Number of children: Not on file    Years of education: Not on file    Highest education level: Not on file   Occupational History    Not on file   Tobacco Use    Smoking status: Never    Smokeless tobacco: Never   Substance and Sexual Activity    Alcohol use: Yes     Alcohol/week: 0.0 standard drinks     Comment: social    Drug use: No    Sexual activity: Never     Partners: Male   Other Topics Concern     Service No    Blood Transfusions No    Caffeine Concern No    Occupational Exposure No    Hobby Hazards No    Sleep Concern No    Stress Concern No    Weight Concern No    Special Diet No    Back Care No    Exercise Yes     Comment: goes to [employer] Helmet No    Seat Belt Yes    Self-Exams Yes   Social History Narrative    Not on file     Social Determinants of Health     Financial Resource Strain: Not on file   Food Insecurity: Not on file   Transportation Needs: Not on file   Physical Activity: Not on file   Stress: Not on file   Social Connections: Not on file   Intimate Partner Violence: Not on file   Housing Stability: Not on file      Current Outpatient Medications   Medication Sig    FLUoxetine (PROzac) 20 mg capsule Take 1 Capsule by mouth in the morning. losartan (COZAAR) 100 mg tablet Take 1 Tablet by mouth in the morning.     rosuvastatin (CRESTOR) 40 mg tablet Take 1 Tablet by mouth in the morning. fenofibrate nanocrystallized (TRICOR) 145 mg tablet Take 1 Tablet by mouth in the morning. amLODIPine (NORVASC) 5 mg tablet Take 1 Tablet by mouth in the morning. cholecalciferol (VITAMIN D3) (2,000 UNITS /50 MCG) cap capsule Take  by mouth daily. acetaminophen (TYLENOL) 500 mg tablet Take  by mouth every six (6) hours as needed for Pain. No current facility-administered medications for this visit. Review of Symptoms:    CONST  No weight change. No fever, chills, sweats    ENT No visual changes, URI sx, sore throat    CV  See HPI   RESP  No cough, or sputum, wheezing. Also see HPI   GI  No abdominal pain or change in bowel habits. No heartburn or dysphagia. No melena or rectal bleeding.   No dysuria, urgency, frequency, hematuria   MSKEL  No joint pain, swelling. No muscle pain. SKIN  No rash or lesions. NEURO  No headache, syncope, or seizure. No weakness, loss of sensation, or paresthesias. PSYCH  No low mood or depression  No anxiety. HE/LYMPH  No easy bruising, abnormal bleeding, or enlarged glands. Physical ExamPhysical Exam:    There were no vitals taken for this visit. Gen: NAD  HEENT:  PERRL, throat clear  Neck: no adenopathy, no thyromegaly, no JVD   Heart:  Regular,Nl S1S2,  no murmur, gallop or rub. Lungs:  clear  Abdomen:   Soft, non-tender, bowel sounds are active.    Extremities:  No edema  Pulse: symmetric  Neuro: A&O times 3, No focal neuro deficits    EKG:     Labs:   Lab Results   Component Value Date/Time    Sodium 140 08/03/2022 08:39 AM    Sodium 140 02/03/2022 08:36 AM    Sodium 142 05/19/2021 07:59 AM    Sodium 140 03/11/2021 02:04 PM    Sodium 142 12/03/2020 09:12 AM    Potassium 4.6 08/03/2022 08:39 AM    Potassium 4.4 02/03/2022 08:36 AM    Potassium 4.9 05/19/2021 07:59 AM    Potassium 4.4 03/11/2021 02:04 PM    Potassium 4.7 12/03/2020 09:12 AM    Chloride 109 (H) 08/03/2022 08:39 AM    Chloride 111 (H) 02/03/2022 08:36 AM    Chloride 111 (H) 05/19/2021 07:59 AM    Chloride 108 03/11/2021 02:04 PM    Chloride 105 12/03/2020 09:12 AM    CO2 24 08/03/2022 08:39 AM    CO2 24 02/03/2022 08:36 AM    CO2 25 05/19/2021 07:59 AM    CO2 26 03/11/2021 02:04 PM    CO2 20 12/03/2020 09:12 AM    Anion gap 7 08/03/2022 08:39 AM    Anion gap 5 02/03/2022 08:36 AM    Anion gap 6 05/19/2021 07:59 AM    Anion gap 6 03/11/2021 02:04 PM    Glucose 89 08/03/2022 08:39 AM    Glucose 83 02/03/2022 08:36 AM    Glucose 92 05/19/2021 07:59 AM    Glucose 101 (H) 03/11/2021 02:04 PM    Glucose 83 12/03/2020 09:12 AM    BUN 22 (H) 08/03/2022 08:39 AM    BUN 16 02/03/2022 08:36 AM    BUN 16 05/19/2021 07:59 AM    BUN 20 03/11/2021 02:04 PM    BUN 14 12/03/2020 09:12 AM    Creatinine 1.06 (H) 08/03/2022 08:39 AM    Creatinine 1.01 02/03/2022 08:36 AM    Creatinine 1.15 (H) 05/19/2021 07:59 AM    Creatinine 1.12 (H) 03/11/2021 02:04 PM    Creatinine 0.95 12/03/2020 09:12 AM    BUN/Creatinine ratio 21 (H) 08/03/2022 08:39 AM    BUN/Creatinine ratio 16 02/03/2022 08:36 AM    BUN/Creatinine ratio 14 05/19/2021 07:59 AM    BUN/Creatinine ratio 18 03/11/2021 02:04 PM    BUN/Creatinine ratio 15 12/03/2020 09:12 AM    GFR est AA >60 08/03/2022 08:39 AM    GFR est AA >60 02/03/2022 08:36 AM    GFR est AA 55 (L) 05/19/2021 07:59 AM    GFR est AA 57 (L) 03/11/2021 02:04 PM    GFR est AA 67 12/03/2020 09:12 AM    GFR est non-AA 50 (L) 08/03/2022 08:39 AM    GFR est non-AA 53 (L) 02/03/2022 08:36 AM    GFR est non-AA 46 (L) 05/19/2021 07:59 AM    GFR est non-AA 47 (L) 03/11/2021 02:04 PM    GFR est non-AA 58 (L) 12/03/2020 09:12 AM    Calcium 10.3 (H) 08/03/2022 08:39 AM    Calcium 10.3 (H) 02/03/2022 08:36 AM    Calcium 10.0 05/19/2021 07:59 AM    Calcium 9.9 03/11/2021 02:04 PM    Calcium 10.5 (H) 12/03/2020 09:12 AM    Bilirubin, total 0.4 08/03/2022 08:39 AM    Bilirubin, total 0.3 05/19/2021 07:59 AM    Bilirubin, total 0.3 06/03/2020 08:34 AM    Bilirubin, total 0.3 01/13/2020 09:48 AM    Bilirubin, total 0.3 06/12/2018 07:38 AM    Alk. phosphatase 43 (L) 08/03/2022 08:39 AM    Alk. phosphatase 51 05/19/2021 07:59 AM    Alk. phosphatase 44 06/03/2020 08:34 AM    Alk. phosphatase 43 01/13/2020 09:48 AM    Alk.  phosphatase 39 06/12/2018 07:38 AM    Protein, total 7.2 08/03/2022 08:39 AM    Protein, total 7.0 05/19/2021 07:59 AM    Protein, total 6.9 06/03/2020 08:34 AM    Protein, total 6.9 01/13/2020 09:48 AM    Protein, total 7.3 06/12/2018 07:38 AM    Albumin 3.9 08/03/2022 08:39 AM    Albumin 4.0 05/19/2021 07:59 AM    Albumin 4.5 06/03/2020 08:34 AM    Albumin 4.9 (H) 01/13/2020 09:48 AM    Albumin 4.8 06/12/2018 07:38 AM    Globulin 3.3 08/03/2022 08:39 AM    Globulin 3.0 05/19/2021 07:59 AM    A-G Ratio 1.2 08/03/2022 08:39 AM    A-G Ratio 1.3 05/19/2021 07:59 AM    A-G Ratio 1.9 06/03/2020 08:34 AM    A-G Ratio 2.5 (H) 01/13/2020 09:48 AM    A-G Ratio 1.9 06/12/2018 07:38 AM    ALT (SGPT) 20 08/03/2022 08:39 AM    ALT (SGPT) 21 05/19/2021 07:59 AM    ALT (SGPT) 13 06/03/2020 08:34 AM    ALT (SGPT) 15 01/13/2020 09:48 AM    ALT (SGPT) 24 06/11/2019 09:04 AM     No results found for: CPK, CPKX, CPX  Lab Results   Component Value Date/Time    Cholesterol, total 163 02/03/2022 08:36 AM    Cholesterol, total 152 06/03/2020 08:34 AM    Cholesterol, total 155 06/11/2019 09:04 AM    Cholesterol, total 154 06/12/2018 07:38 AM    Cholesterol, total 167 12/01/2017 09:12 AM    HDL Cholesterol 81 02/03/2022 08:36 AM    HDL Cholesterol 69 06/03/2020 08:34 AM    HDL Cholesterol 68 06/11/2019 09:04 AM    HDL Cholesterol 66 06/12/2018 07:38 AM    HDL Cholesterol 71 12/01/2017 09:12 AM    LDL, calculated 65.6 02/03/2022 08:36 AM    LDL, calculated 68 06/03/2020 08:34 AM    LDL, calculated 66 06/11/2019 09:04 AM    LDL, calculated 66 06/12/2018 07:38 AM    LDL, calculated 75 12/01/2017 09:12 AM    Triglyceride 82 02/03/2022 08:36 AM    Triglyceride 74 06/03/2020 08:34 AM    Triglyceride 106 06/11/2019 09:04 AM    Triglyceride 111 06/12/2018 07:38 AM    Triglyceride 105 12/01/2017 09:12 AM    CHOL/HDL Ratio 2.0 02/03/2022 08:36 AM     No results found for this or any previous visit. Assessment:         Patient Active Problem List    Diagnosis Date Noted    Major depressive disorder, recurrent, mild 02/03/2022    Chronic superficial gastritis without bleeding 04/23/2018    Esophagitis determined by endoscopy 04/23/2018    Diverticula of colon 04/23/2018    Elevated glucose 12/01/2017    Mixed hyperlipidemia 10/21/2016    Essential hypertension with goal blood pressure less than 140/90 07/18/2016    Advance directive discussed with patient 03/17/2016    Vitamin D deficiency 12/07/2015    Depression     Hyperlipidemia     Arthritis     Metabolic syndrome       Hx hypertension, dyslipidemia, metabolic syndrome followed at [facility]. No prior known hx CAD. Seen for pre-op clearance for planned cataract sgy, EKG with new (?age) ILBBB. No current CV sx or complaints. Cardiac w/u with Lexiscan MPI 4/21/21--normal perfusion, no infarct/ischemia, LVEF 74%. Echo 4/12/21:  LVEF 65-70, mild MR, otherwise normal. Has significantly lower BP L arm compared to R, no sx related. (previous dopplers neg per pt)    07/21/22    ECHO ADULT COMPLETE 07/24/2022 7/24/2022    Interpretation Summary    Left Ventricle: Low normal left ventricular systolic function with a visually estimated EF of 50 - 55%. Left ventricle size is normal. Mildly increased wall thickness. Normal wall motion. Normal diastolic function for age. Left Atrium: Left atrium is mildly dilated. Left atrial volume index is mildly increased (35-41 mL/m2). Mitral Valve: Mild to moderate regurgitation.     Signed by: Karen Melgar MD on 7/24/2022 11:23 AM    07/21/22    NUCLEAR CARDIAC STRESS TEST 07/22/2022, 07/22/2022 7/22/2022    Interpretation Summary  LEXT NUCLEAR STRESS TEST    Pt completed the Bella Nuclear Protocol  No c/o chest pain  Pt c/o mild dyspnea. No HA or GI symptoms  Resting EKG noted NSR with LBBB  Appropriate pulse and BP response to stress  No significant ST change or ectopy  No changes concerning for ischemia  No complications  Negative Bella Stress Exam for ishcemia  Nuclear results reported separately  POLO NUR MD  [phone]      INDICATION: Left bundle branch block, history of hypertension. COMPARISON:  4/21/2021    CORRELATIVE IMAGING STUDIES:  None    TRACER: Tc 99m Sestamibi    TECHNIQUE:  Resting SPECT images of the heart were obtained following the uneventful intravenous administration of 11.0 mCi of Tc 99m Sestamibi. Gated stress SPECT images of the heart were obtained following Lexiscan protocol and the uneventful intravenous administration of 33.0 mCi of Tc 99m Sestamibi. FINDINGS:  The rest and stress perfusion images demonstrate no significant perfusion defect or evidence of myocardial reversibility. The gated images demonstrate normal global and regional wall motion and thickening. Left ventricular ejection fraction is 53 %. Impression:  Normal gated rest/stress myocardial perfusion imaging study. No evidence of myocardial ischemia or infarction. Left ventricular ejection fraction is 53 %. Signed by: Marianna Mckenzie MD on 7/22/2022 11:39 AM, Signed by: Cameron Chen MD on 7/22/2022 11:19 PM       Plan:     Previous cardiac work up  [employer] MPI 4/21/21--normal perfusion, no infarct/ischemia, LVEF 74%. Echo 4/12/21:  LVEF 65-70, mild MR, otherwise normal      LBBB, new onset 5/2022  LVEF 50-55% mild-mod MR per echo in 7/24/2022  NST 7/222:  Normal gated rest/stress myocardial perfusion imaging study. No evidence of myocardial ischemia or infarction. Left ventricular ejection fraction is 53 %.     HTN  Controlled with current therapy  Serum Cr 1.06 in 8/2022  Recall: Has significantly lower BP L arm compared to R, no sx related. (previous dopplers neg per pt)    HLD  2/2022 LDL 65 On rosuva 40 mg daily  Labs and lipids per PCP    Discussed importance of diet and exercise - eventual goal of 30 - 60 minutes, 5-7 times a week as per AHA guideline. Goal of 10 % (15 lbs) weight loss for 6 months.       RTC in 6 mos    Lin Resendiz NP

## 2022-11-10 ENCOUNTER — OFFICE VISIT (OUTPATIENT)
Dept: CARDIOLOGY CLINIC | Age: 77
End: 2022-11-10
Payer: MEDICARE

## 2022-11-10 VITALS
BODY MASS INDEX: 25.87 KG/M2 | SYSTOLIC BLOOD PRESSURE: 114 MMHG | WEIGHT: 146 LBS | TEMPERATURE: 97.7 F | HEART RATE: 66 BPM | DIASTOLIC BLOOD PRESSURE: 70 MMHG | OXYGEN SATURATION: 97 % | HEIGHT: 63 IN | RESPIRATION RATE: 18 BRPM

## 2022-11-10 DIAGNOSIS — E78.2 MIXED HYPERLIPIDEMIA: ICD-10-CM

## 2022-11-10 DIAGNOSIS — R60.0 LOCALIZED EDEMA: ICD-10-CM

## 2022-11-10 DIAGNOSIS — R94.31 ABNORMAL EKG: ICD-10-CM

## 2022-11-10 DIAGNOSIS — I44.7 LEFT BUNDLE BRANCH BLOCK: ICD-10-CM

## 2022-11-10 DIAGNOSIS — I10 ESSENTIAL HYPERTENSION WITH GOAL BLOOD PRESSURE LESS THAN 140/90: Primary | ICD-10-CM

## 2022-11-10 PROCEDURE — G8536 NO DOC ELDER MAL SCRN: HCPCS | Performed by: NURSE PRACTITIONER

## 2022-11-10 PROCEDURE — 1123F ACP DISCUSS/DSCN MKR DOCD: CPT | Performed by: NURSE PRACTITIONER

## 2022-11-10 PROCEDURE — G8427 DOCREV CUR MEDS BY ELIG CLIN: HCPCS | Performed by: NURSE PRACTITIONER

## 2022-11-10 PROCEDURE — 99214 OFFICE O/P EST MOD 30 MIN: CPT | Performed by: NURSE PRACTITIONER

## 2022-11-10 PROCEDURE — G8399 PT W/DXA RESULTS DOCUMENT: HCPCS | Performed by: NURSE PRACTITIONER

## 2022-11-10 PROCEDURE — G8752 SYS BP LESS 140: HCPCS | Performed by: NURSE PRACTITIONER

## 2022-11-10 PROCEDURE — 3078F DIAST BP <80 MM HG: CPT | Performed by: NURSE PRACTITIONER

## 2022-11-10 PROCEDURE — G8754 DIAS BP LESS 90: HCPCS | Performed by: NURSE PRACTITIONER

## 2022-11-10 PROCEDURE — 1090F PRES/ABSN URINE INCON ASSESS: CPT | Performed by: NURSE PRACTITIONER

## 2022-11-10 PROCEDURE — 3074F SYST BP LT 130 MM HG: CPT | Performed by: NURSE PRACTITIONER

## 2022-11-10 PROCEDURE — G8417 CALC BMI ABV UP PARAM F/U: HCPCS | Performed by: NURSE PRACTITIONER

## 2022-11-10 PROCEDURE — G9717 DOC PT DX DEP/BP F/U NT REQ: HCPCS | Performed by: NURSE PRACTITIONER

## 2022-11-10 PROCEDURE — 1101F PT FALLS ASSESS-DOCD LE1/YR: CPT | Performed by: NURSE PRACTITIONER

## 2022-11-10 NOTE — PROGRESS NOTES
Identified pt with two pt identifiers(name and ). Reviewed record in preparation for visit and have obtained necessary documentation. Chief Complaint   Patient presents with    Cholesterol Problem    Hypertension      Vitals:    11/10/22 1017   BP: 114/70   Pulse: 66   Resp: 18   Temp: 97.7 °F (36.5 °C)   TempSrc: Temporal   SpO2: 97%   Weight: 146 lb (66.2 kg)   Height: 5' 3\" (1.6 m)   PainSc:   0 - No pain       Medications reviewed/approved by provider. Health Maintenance Review: Patient reminded of \"due or due soon\" health maintenance. I have asked the patient to contact his/her primary care provider (PCP) for follow-up on his/her health maintenance. Coordination of Care Questionnaire:  :   1) Have you been to an emergency room, urgent care, or hospitalized since your last visit? If yes, where when, and reason for visit? no       2. Have seen or consulted any other health care provider since your last visit? If yes, where when, and reason for visit? NO      Patient is accompanied by self I have received verbal consent from Benny Roth to discuss any/all medical information while they are present in the room.

## 2022-11-30 ENCOUNTER — DOCUMENTATION ONLY (OUTPATIENT)
Dept: FAMILY MEDICINE CLINIC | Age: 77
End: 2022-11-30

## 2022-11-30 NOTE — PROGRESS NOTES
Patient walks into the office. She reports that while walking her son's dog, she fell against the house. She has a bruise and knot on her right forehead and a cut above her lip just about in the middle. She denies LOC or any other symptoms. I have attempted to open the cut on her lip and it does not open up. I have looked under her top lip and the cut does not go all the way through. I have consulted with Dr Martin Mayorga, he tells me that since the cut does not open, it does not require stitches. Also, discuss concussion protocol with the patient. I have done this. Patient verbalizes understanding. If her vision changes or her LOC status changes, she will got to the ED.

## 2023-01-05 ENCOUNTER — VIRTUAL VISIT (OUTPATIENT)
Dept: FAMILY MEDICINE CLINIC | Age: 78
End: 2023-01-05
Payer: MEDICARE

## 2023-01-05 DIAGNOSIS — J06.9 UPPER RESPIRATORY TRACT INFECTION, UNSPECIFIED TYPE: Primary | ICD-10-CM

## 2023-01-05 PROCEDURE — 99442 PR PHYS/QHP TELEPHONE EVALUATION 11-20 MIN: CPT | Performed by: PHYSICIAN ASSISTANT

## 2023-01-05 RX ORDER — AZITHROMYCIN 250 MG/1
TABLET, FILM COATED ORAL
Qty: 6 TABLET | Refills: 0 | Status: SHIPPED | OUTPATIENT
Start: 2023-01-05

## 2023-01-05 NOTE — PROGRESS NOTES
Kathrin Reeves is a 13 year old female presents today for   Chief Complaint   Patient presents with   • Ear Drainage     right     Kathrin Reeves is a 13 year old female presenting with right ear pain x 4 days.  Yellow drainage per patient.    Took Aleve this morning at approx 0615, helped with the pain.      ALLERGIES:  No Known Allergies    /85   Pulse 76   Temp 98.2 °F (36.8 °C) (Oral)   Resp 16   Wt 78.5 kg   LMP 09/06/2020 (Approximate)   Ob/Gyn Status: Having periods  Medications: currently is not taking any medications        Health Maintenance Due   Topic Date Due   • Hepatitis B Vaccine (1 of 3 - 3-dose primary series) Never done   • IPV Vaccine (1 of 3 - 4-dose series) Never done   • MMR Vaccine (1 of 2 - Standard series) Never done   • Varicella Vaccine (1 of 2 - 2-dose childhood series) Never done   • Hepatitis A Vaccine (1 of 2 - 2-dose series) Never done   • Annual Physical (ages 3-18)  Never done   • DTaP/Tdap/Td Vaccine (1 - Tdap) Never done   • Meningococcal Vaccine (1 - 2-dose series) Never done   • HPV Vaccine (1 - 2-dose series) Never done   • Depression Screening  Never done   • Influenza Vaccine (1) Never done       PCP: SAHIL Valle    Patient here with father.    Patient would like communication of their results via:    Cell Phone: father  Telephone Information:   Mobile [phone]     Okay to leave a message containing results? Yes       Renuka Beck is a 76 y.o. female, evaluated via audio-only technology on 1/5/2023 for Cold Symptoms (X 1 week, alvin, cough, afebrile)    Assessment & Plan:   Diagnoses and all orders for this visit:    1. Upper respiratory tract infection, unspecified type  -     azithromycin (ZITHROMAX) 250 mg tablet; Take 2 tablets today, then take 1 tablet daily    Encourage rest & fluids. Discussed otc medications for symptomatic relief. RTO if sxs persist/worsen or develops any additional sxs/concerns. Seek immediate care/to ER for any \"red flag\" sxs. Pt verbalizes understanding and agrees with the plan. The complexity of medical decision making for this visit is moderate       Subjective:   \"Chest cold and cough for about a week\"  Feels a bit worse today, better when gets up and moving. She c/o \"just congestion and cough\"  Has nasal congestion, nothing in chest.  Is not able to bring anything up with cough. She denies fever/chills, ST, ear pain, HA, sinus pain, cp, sob, wheeze, or GI symptoms. No other symptoms. Taking Mucinex, helps temporarily. Has been fully vaccined for covid. No testing. No recent sick contacts or travel. Prior to Admission medications    Medication Sig Start Date End Date Taking? Authorizing Provider   azithromycin (ZITHROMAX) 250 mg tablet Take 2 tablets today, then take 1 tablet daily 1/5/23  Yes Glenn Graf PA-C   FLUoxetine (PROzac) 20 mg capsule Take 1 Capsule by mouth in the morning. 8/3/22  Yes Glenn Graf PA-C   losartan (COZAAR) 100 mg tablet Take 1 Tablet by mouth in the morning. 8/3/22  Yes Glenn Graf PA-C   rosuvastatin (CRESTOR) 40 mg tablet Take 1 Tablet by mouth in the morning. 8/3/22  Yes Glenn Graf PA-C   fenofibrate nanocrystallized (TRICOR) 145 mg tablet Take 1 Tablet by mouth in the morning. 8/3/22  Yes Glenn Graf PA-C   amLODIPine (NORVASC) 5 mg tablet Take 1 Tablet by mouth in the morning.  8/3/22  Yes Glenn Graf PA-C cholecalciferol (VITAMIN D3) (2,000 UNITS /50 MCG) cap capsule Take  by mouth daily. Yes Provider, Historical   acetaminophen (TYLENOL) 500 mg tablet Take  by mouth every six (6) hours as needed for Pain. Yes Provider, Historical     Patient Active Problem List   Diagnosis Code    Depression F32. A    Hyperlipidemia E78.5    Arthritis R54.76    Metabolic syndrome X94.28    Vitamin D deficiency E55.9    Advance directive discussed with patient Z71.89    Essential hypertension with goal blood pressure less than 140/90 I10    Mixed hyperlipidemia E78.2    Elevated glucose R73.09    Chronic superficial gastritis without bleeding K29.30    Esophagitis determined by endoscopy K20.90    Diverticula of colon K57.30    Major depressive disorder, recurrent, mild F33.0     Patient Active Problem List    Diagnosis Date Noted    Major depressive disorder, recurrent, mild 02/03/2022    Chronic superficial gastritis without bleeding 04/23/2018    Esophagitis determined by endoscopy 04/23/2018    Diverticula of colon 04/23/2018    Elevated glucose 12/01/2017    Mixed hyperlipidemia 10/21/2016    Essential hypertension with goal blood pressure less than 140/90 07/18/2016    Advance directive discussed with patient 03/17/2016    Vitamin D deficiency 12/07/2015    Depression     Hyperlipidemia     Arthritis     Metabolic syndrome      Current Outpatient Medications   Medication Sig Dispense Refill    azithromycin (ZITHROMAX) 250 mg tablet Take 2 tablets today, then take 1 tablet daily 6 Tablet 0    FLUoxetine (PROzac) 20 mg capsule Take 1 Capsule by mouth in the morning. 90 Capsule 1    losartan (COZAAR) 100 mg tablet Take 1 Tablet by mouth in the morning. 90 Tablet 1    rosuvastatin (CRESTOR) 40 mg tablet Take 1 Tablet by mouth in the morning. 90 Tablet 1    fenofibrate nanocrystallized (TRICOR) 145 mg tablet Take 1 Tablet by mouth in the morning.  90 Tablet 1    amLODIPine (NORVASC) 5 mg tablet Take 1 Tablet by mouth in the morning. 90 Tablet 1    cholecalciferol (VITAMIN D3) (2,000 UNITS /50 MCG) cap capsule Take  by mouth daily. acetaminophen (TYLENOL) 500 mg tablet Take  by mouth every six (6) hours as needed for Pain. Allergies   Allergen Reactions    Ace Inhibitors Cough    Lipitor [Atorvastatin] Myalgia     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    Depression     Endocrine disorder     not current      Hyperlipidemia     Hypertension     Menopause     Metabolic syndrome     Vitamin D deficiency      Past Surgical History:   Procedure Laterality Date    COLONOSCOPY N/A 4/23/2018    COLONOSCOPY POSSIBLE DILATION performed by Loco Blankenship MD at [facility]    HX BREAST BIOPSY Left     benign    HX COLONOSCOPY      nl, done in [place]    HX COLONOSCOPY  2018    polyp    HX ENDOSCOPY  2018    HX GYN  1996    MARY ANN & BSO     HX HYSTERECTOMY      age 52    HX OOPHORECTOMY      [place] BREAST SURGERY PROCEDURE UNLISTED  8/2010    Breast BX; negative     Family History   Problem Relation Age of Onset    Hypertension Brother     Elevated Lipids Brother     Breast Cancer Maternal Aunt     No Known Problems Mother      Social History     Tobacco Use    Smoking status: Never    Smokeless tobacco: Never   Substance Use Topics    Alcohol use: Yes     Alcohol/week: 0.0 standard drinks     Comment: social       ROS    No data recorded     Nithya Ha was evaluated through a patient-initiated, synchronous (real-time) audio only encounter. She (or guardian if applicable) is aware that it is a billable service, which includes applicable co-pays, with coverage as determined by her insurance carrier. This visit was conducted with the patient's (and/or Davidsonlitzy Rayo guardian's) verbal consent. She has not had a related appointment within my department in the past 7 days or scheduled within the next 24 hours. The patient was located in a state where the provider was licensed to provide care.   The patient was located at: Home: [place] [phone]  The provider was located at:  Other: home in [place]    Total Time: minutes: 11-20 minutes    Mildred Batres PA-C

## 2023-02-06 ENCOUNTER — OFFICE VISIT (OUTPATIENT)
Dept: FAMILY MEDICINE CLINIC | Age: 78
End: 2023-02-06
Payer: MEDICARE

## 2023-02-06 VITALS
BODY MASS INDEX: 25.87 KG/M2 | RESPIRATION RATE: 18 BRPM | HEART RATE: 57 BPM | SYSTOLIC BLOOD PRESSURE: 120 MMHG | WEIGHT: 146 LBS | OXYGEN SATURATION: 98 % | HEIGHT: 63 IN | DIASTOLIC BLOOD PRESSURE: 74 MMHG

## 2023-02-06 DIAGNOSIS — E78.2 MIXED HYPERLIPIDEMIA: ICD-10-CM

## 2023-02-06 DIAGNOSIS — F32.A DEPRESSION, UNSPECIFIED DEPRESSION TYPE: ICD-10-CM

## 2023-02-06 DIAGNOSIS — E78.00 PURE HYPERCHOLESTEROLEMIA: ICD-10-CM

## 2023-02-06 DIAGNOSIS — I10 ESSENTIAL HYPERTENSION WITH GOAL BLOOD PRESSURE LESS THAN 140/90: Primary | ICD-10-CM

## 2023-02-06 PROCEDURE — 3078F DIAST BP <80 MM HG: CPT | Performed by: PHYSICIAN ASSISTANT

## 2023-02-06 PROCEDURE — G8427 DOCREV CUR MEDS BY ELIG CLIN: HCPCS | Performed by: PHYSICIAN ASSISTANT

## 2023-02-06 PROCEDURE — G8417 CALC BMI ABV UP PARAM F/U: HCPCS | Performed by: PHYSICIAN ASSISTANT

## 2023-02-06 PROCEDURE — 1101F PT FALLS ASSESS-DOCD LE1/YR: CPT | Performed by: PHYSICIAN ASSISTANT

## 2023-02-06 PROCEDURE — G9717 DOC PT DX DEP/BP F/U NT REQ: HCPCS | Performed by: PHYSICIAN ASSISTANT

## 2023-02-06 PROCEDURE — 3074F SYST BP LT 130 MM HG: CPT | Performed by: PHYSICIAN ASSISTANT

## 2023-02-06 PROCEDURE — G8399 PT W/DXA RESULTS DOCUMENT: HCPCS | Performed by: PHYSICIAN ASSISTANT

## 2023-02-06 PROCEDURE — G8536 NO DOC ELDER MAL SCRN: HCPCS | Performed by: PHYSICIAN ASSISTANT

## 2023-02-06 PROCEDURE — 1090F PRES/ABSN URINE INCON ASSESS: CPT | Performed by: PHYSICIAN ASSISTANT

## 2023-02-06 PROCEDURE — 99214 OFFICE O/P EST MOD 30 MIN: CPT | Performed by: PHYSICIAN ASSISTANT

## 2023-02-06 PROCEDURE — 36415 COLL VENOUS BLD VENIPUNCTURE: CPT | Performed by: PHYSICIAN ASSISTANT

## 2023-02-06 PROCEDURE — 1123F ACP DISCUSS/DSCN MKR DOCD: CPT | Performed by: PHYSICIAN ASSISTANT

## 2023-02-06 RX ORDER — FENOFIBRATE 145 MG/1
145 TABLET, COATED ORAL DAILY
Qty: 90 TABLET | Refills: 1 | Status: SHIPPED | OUTPATIENT
Start: 2023-02-06

## 2023-02-06 RX ORDER — FLUOXETINE HYDROCHLORIDE 20 MG/1
20 CAPSULE ORAL DAILY
Qty: 90 CAPSULE | Refills: 1 | Status: SHIPPED | OUTPATIENT
Start: 2023-02-06

## 2023-02-06 RX ORDER — LOSARTAN POTASSIUM 100 MG/1
100 TABLET ORAL DAILY
Qty: 90 TABLET | Refills: 1 | Status: SHIPPED | OUTPATIENT
Start: 2023-02-06

## 2023-02-06 RX ORDER — AMLODIPINE BESYLATE 5 MG/1
5 TABLET ORAL DAILY
Qty: 90 TABLET | Refills: 1 | Status: SHIPPED | OUTPATIENT
Start: 2023-02-06

## 2023-02-06 RX ORDER — ROSUVASTATIN CALCIUM 40 MG/1
40 TABLET, COATED ORAL DAILY
Qty: 90 TABLET | Refills: 1 | Status: SHIPPED | OUTPATIENT
Start: 2023-02-06

## 2023-02-06 NOTE — PROGRESS NOTES
Meka Horvath is a 76 y.o. female who presents to the office today with the following:  Chief Complaint   Patient presents with    Follow-up     6 months    Hypertension    Cholesterol Problem       Follow-up  Pertinent negatives include no chest pain and no shortness of breath. Hypertension   Pertinent negatives include no chest pain and no shortness of breath. Cholesterol Problem  Pertinent negatives include no chest pain and no shortness of breath. HTN  No recent checks at home. Reports doing well on current losartan and amlodipine. No med SEs. The patient denies CP, SOB, LOMELI, orthopnea, PND, LE swelling, palpitations, syncope, pre-syncope or fatigue. Pt doing well on current statin therapy. Also taking fenofibrate. Denies med SEs or myalgias. Is fasting today for labs. Lab Results   Component Value Date/Time    Cholesterol, total 163 02/03/2022 08:36 AM    HDL Cholesterol 81 02/03/2022 08:36 AM    LDL, calculated 65.6 02/03/2022 08:36 AM    VLDL, calculated 16.4 02/03/2022 08:36 AM    Triglyceride 82 02/03/2022 08:36 AM    CHOL/HDL Ratio 2.0 02/03/2022 08:36 AM     Lab Results   Component Value Date/Time    ALT (SGPT) 20 08/03/2022 08:39 AM    AST (SGOT) 16 08/03/2022 08:39 AM    Alk. phosphatase 43 (L) 08/03/2022 08:39 AM    Bilirubin, total 0.4 08/03/2022 08:39 AM     H/o depression. Well controlled on fluoxetine. No recurrence or new concerns. Admits to \"normal lows\" but nothing consistent of severe, denies any SI/HI. Pt tells me she has her mammogram scheduled next week. Pt otherwise reports feeling well with no other complaints or acute concerns. Review of Systems   Constitutional: Negative. HENT: Negative. Respiratory: Negative. Negative for shortness of breath. Cardiovascular: Negative. Negative for chest pain. Neurological: Negative. Psychiatric/Behavioral:  Negative for depression, hallucinations, memory loss, substance abuse and suicidal ideas.  The patient is not nervous/anxious and does not have insomnia. See HPI. 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    Depression     Endocrine disorder     not current      Hyperlipidemia     Hypertension     Menopause     Metabolic syndrome     Vitamin D deficiency        Past Surgical History:   Procedure Laterality Date    COLONOSCOPY N/A 4/23/2018    COLONOSCOPY POSSIBLE DILATION performed by Shayy Lomeli MD at [facility]    HX BREAST BIOPSY Left     benign    HX COLONOSCOPY      nl, done in [place]    HX COLONOSCOPY  2018    polyp    HX ENDOSCOPY  2018    HX GYN  1996    [facility] & BSO     HX HYSTERECTOMY      age 52    HX OOPHORECTOMY      [place] UNLISTED PROCEDURE BREAST  8/2010    Breast BX; negative       Allergies   Allergen Reactions    Ace Inhibitors Cough    Lipitor [Atorvastatin] Myalgia       Current Outpatient Medications   Medication Sig    FLUoxetine (PROzac) 20 mg capsule Take 1 Capsule by mouth daily. amLODIPine (NORVASC) 5 mg tablet Take 1 Tablet by mouth daily. losartan (COZAAR) 100 mg tablet Take 1 Tablet by mouth daily. rosuvastatin (CRESTOR) 40 mg tablet Take 1 Tablet by mouth daily. fenofibrate nanocrystallized (TRICOR) 145 mg tablet Take 1 Tablet by mouth daily. cholecalciferol (VITAMIN D3) (2,000 UNITS /50 MCG) cap capsule Take  by mouth daily. acetaminophen (TYLENOL) 500 mg tablet Take  by mouth every six (6) hours as needed for Pain. No current facility-administered medications for this visit. Social History     Socioeconomic History    Marital status: SINGLE   Tobacco Use    Smoking status: Never    Smokeless tobacco: Never   Substance and Sexual Activity    Alcohol use:  Yes     Alcohol/week: 0.0 standard drinks     Comment: social    Drug use: No    Sexual activity: Never     Partners: Male   Other Topics Concern     Service No    Blood Transfusions No    Caffeine Concern No    Occupational Exposure No    Hobby Hazards No    Sleep Concern No    Stress Concern No    Weight Concern No    Special Diet No    Back Care No    Exercise Yes     Comment: goes to [employer] Helmet No    Seat Belt Yes    Self-Exams Yes       Family History   Problem Relation Age of Onset    Hypertension Brother     Elevated Lipids Brother     Breast Cancer Maternal Aunt     No Known Problems Mother          Physical Exam:  Visit Vitals  /74 (BP 1 Location: Left upper arm, BP Patient Position: At rest, BP Cuff Size: Adult)   Pulse (!) 57   Resp 18   Ht 5' 3\" (1.6 m)   Wt 146 lb (66.2 kg)   SpO2 98%   BMI 25.86 kg/m²     Physical Exam  Vitals and nursing note reviewed. Constitutional:       Appearance: Normal appearance. HENT:      Head: Normocephalic and atraumatic. Eyes:      Conjunctiva/sclera: Conjunctivae normal.   Cardiovascular:      Rate and Rhythm: Normal rate and regular rhythm. Pulses: Normal pulses. Heart sounds: Normal heart sounds. Pulmonary:      Effort: Pulmonary effort is normal.      Breath sounds: Normal breath sounds. Musculoskeletal:         General: No swelling. Cervical back: Neck supple. Skin:     General: Skin is warm and dry. Neurological:      Mental Status: She is alert and oriented to person, place, and time. Psychiatric:         Mood and Affect: Mood normal.       Assessment/Plan:    ICD-10-CM ICD-9-CM    1. Essential hypertension with goal blood pressure less than 140/90  I10 401.9 amLODIPine (NORVASC) 5 mg tablet      losartan (COZAAR) 100 mg tablet      CBC WITH AUTOMATED DIFF      METABOLIC PANEL, COMPREHENSIVE      METABOLIC PANEL, COMPREHENSIVE      CBC WITH AUTOMATED DIFF      2. Pure hypercholesterolemia  E78.00 272.0 ME COLLECTION VENOUS BLOOD VENIPUNCTURE      LIPID PANEL      LIPID PANEL      3. Depression, unspecified depression type  F32. A 311 FLUoxetine (PROzac) 20 mg capsule      4.  Mixed hyperlipidemia  E78.2 272.2 rosuvastatin (CRESTOR) 40 mg tablet      fenofibrate nanocrystallized (TRICOR) 145 mg tablet        stable. Continue current meds. Routine 6 mo f/up. Will notify patient of lab results and any further recommendations. Counseled on care gaps. Follow-up and Dispositions    Return in about 6 months (around 8/6/2023). Seek care in interim for any new sxs or other concerns. Pt verbalizes understanding and agrees with the plan.     Dillon Henderson PA-C

## 2023-02-06 NOTE — PROGRESS NOTES
1. \"Have you been to the ER, urgent care clinic since your last visit? Hospitalized since your last visit? \" No    2. \"Have you seen or consulted any other health care providers outside of the [address] [place] [address] since your last visit? \" No     3. For patients aged 39-70: Has the patient had a colonoscopy / FIT/ Cologuard? No      If the patient is female:    4. For patients aged 41-77: Has the patient had a mammogram within the past 2 years? NA - based on age or sex      11. For patients aged 21-65: Has the patient had a pap smear?  NA - based on age or sex

## 2023-02-06 NOTE — PATIENT INSTRUCTIONS
High Blood Pressure: Care Instructions  Overview     It's normal for blood pressure to go up and down throughout the day. But if it stays up, you have high blood pressure. Another name for high blood pressure is hypertension. Despite what a lot of people think, high blood pressure usually doesn't cause headaches or make you feel dizzy or lightheaded. It usually has no symptoms. But it does increase your risk of stroke, heart attack, and other problems. You and your doctor will talk about your risks of these problems based on your blood pressure. Your doctor will give you a goal for your blood pressure. Your goal will be based on your health and your age. Lifestyle changes, such as eating healthy and being active, are always important to help lower blood pressure. You might also take medicine to reach your blood pressure goal.  Follow-up care is a key part of your treatment and safety. Be sure to make and go to all appointments, and call your doctor if you are having problems. It's also a good idea to know your test results and keep a list of the medicines you take. How can you care for yourself at home? Medical treatment  If you stop taking your medicine, your blood pressure will go back up. You may take one or more types of medicine to lower your blood pressure. Be safe with medicines. Take your medicine exactly as prescribed. Call your doctor if you think you are having a problem with your medicine. Talk to your doctor before you start taking aspirin every day. Aspirin can help certain people lower their risk of a heart attack or stroke. But taking aspirin isn't right for everyone, because it can cause serious bleeding. See your doctor regularly. You may need to see the doctor more often at first or until your blood pressure comes down. If you are taking blood pressure medicine, talk to your doctor before you take decongestants or anti-inflammatory medicine, such as ibuprofen.  Some of these medicines can raise blood pressure. Learn how to check your blood pressure at home. Lifestyle changes  Stay at a healthy weight. This is especially important if you put on weight around the waist. Losing even 10 pounds can help you lower your blood pressure. If your doctor recommends it, get more exercise. Walking is a good choice. Bit by bit, increase the amount you walk every day. Try for at least 30 minutes on most days of the week. You also may want to swim, bike, or do other activities. Avoid or limit alcohol. Talk to your doctor about whether you can drink any alcohol. Try to limit how much sodium you eat to less than 2,300 milligrams (mg) a day. Your doctor may ask you to try to eat less than 1,500 mg a day. Eat plenty of fruits (such as bananas and oranges), vegetables, legumes, whole grains, and low-fat dairy products. Lower the amount of saturated fat in your diet. Saturated fat is found in animal products such as milk, cheese, and meat. Limiting these foods may help you lose weight and also lower your risk for heart disease. Do not smoke. Smoking increases your risk for heart attack and stroke. If you need help quitting, talk to your doctor about stop-smoking programs and medicines. These can increase your chances of quitting for good. When should you call for help? Call 911  anytime you think you may need emergency care. This may mean having symptoms that suggest that your blood pressure is causing a serious heart or blood vessel problem. Your blood pressure may be over 180/120. For example, call 911 if:    You have symptoms of a heart attack. These may include:  Chest pain or pressure, or a strange feeling in the chest.  Sweating. Shortness of breath. Nausea or vomiting. Pain, pressure, or a strange feeling in the back, neck, jaw, or upper belly or in one or both shoulders or arms. Lightheadedness or sudden weakness. A fast or irregular heartbeat. You have symptoms of a stroke.  These may include:  Sudden numbness, tingling, weakness, or loss of movement in your face, arm, or leg, especially on only one side of your body. Sudden vision changes. Sudden trouble speaking. Sudden confusion or trouble understanding simple statements. Sudden problems with walking or balance. A sudden, severe headache that is different from past headaches. You have severe back or belly pain. Do not wait until your blood pressure comes down on its own. Get help right away. Call your doctor now or seek immediate care if:    Your blood pressure is much higher than normal (such as 180/120 or higher), but you don't have symptoms. You think high blood pressure is causing symptoms, such as:  Severe headache. Blurry vision. Watch closely for changes in your health, and be sure to contact your doctor if:    Your blood pressure measures higher than your doctor recommends at least 2 times. That means the top number is higher or the bottom number is higher, or both. You think you may be having side effects from your blood pressure medicine. Where can you learn more? Go to http://www.gray.com/  Enter [id number] in the search box to learn more about \"High Blood Pressure: Care Instructions. \"  Current as of: October 6, 2021               Content Version: 13.4  © 2006-2022 [employer]. Care instructions adapted under license by [employer] (which disclaims liability or warranty for this information). If you have questions about a medical condition or this instruction, always ask your healthcare professional. [place] [address] any warranty or liability for your use of this information.

## 2023-02-07 LAB
ALBUMIN SERPL-MCNC: 3.8 G/DL (ref 3.5–5)
ALBUMIN/GLOB SERPL: 1.1 (ref 1.1–2.2)
ALP SERPL-CCNC: 40 U/L (ref 45–117)
ALT SERPL-CCNC: 19 U/L (ref 12–78)
ANION GAP SERPL CALC-SCNC: 5 MMOL/L (ref 5–15)
AST SERPL-CCNC: 23 U/L (ref 15–37)
BASOPHILS # BLD: 0 K/UL (ref 0–0.1)
BASOPHILS NFR BLD: 1 % (ref 0–1)
BILIRUB SERPL-MCNC: 0.3 MG/DL (ref 0.2–1)
BUN SERPL-MCNC: 15 MG/DL (ref 6–20)
BUN/CREAT SERPL: 16 (ref 12–20)
CALCIUM SERPL-MCNC: 10.1 MG/DL (ref 8.5–10.1)
CHLORIDE SERPL-SCNC: 110 MMOL/L (ref 97–108)
CHOLEST SERPL-MCNC: 162 MG/DL
CO2 SERPL-SCNC: 23 MMOL/L (ref 21–32)
COMMENT, HOLDF: NORMAL
CREAT SERPL-MCNC: 0.92 MG/DL (ref 0.55–1.02)
DIFFERENTIAL METHOD BLD: ABNORMAL
EOSINOPHIL # BLD: 0.3 K/UL (ref 0–0.4)
EOSINOPHIL NFR BLD: 4 % (ref 0–7)
ERYTHROCYTE [DISTWIDTH] IN BLOOD BY AUTOMATED COUNT: 14.3 % (ref 11.5–14.5)
GLOBULIN SER CALC-MCNC: 3.4 G/DL (ref 2–4)
GLUCOSE SERPL-MCNC: 86 MG/DL (ref 65–100)
HCT VFR BLD AUTO: 35.5 % (ref 35–47)
HDLC SERPL-MCNC: 84 MG/DL
HDLC SERPL: 1.9 (ref 0–5)
HGB BLD-MCNC: 10.8 G/DL (ref 11.5–16)
IMM GRANULOCYTES # BLD AUTO: 0.1 K/UL (ref 0–0.04)
IMM GRANULOCYTES NFR BLD AUTO: 1 % (ref 0–0.5)
LDLC SERPL CALC-MCNC: 62.4 MG/DL (ref 0–100)
LYMPHOCYTES # BLD: 2.3 K/UL (ref 0.8–3.5)
LYMPHOCYTES NFR BLD: 34 % (ref 12–49)
MCH RBC QN AUTO: 27.8 PG (ref 26–34)
MCHC RBC AUTO-ENTMCNC: 30.4 G/DL (ref 30–36.5)
MCV RBC AUTO: 91.3 FL (ref 80–99)
MONOCYTES # BLD: 0.4 K/UL (ref 0–1)
MONOCYTES NFR BLD: 6 % (ref 5–13)
NEUTS SEG # BLD: 3.6 K/UL (ref 1.8–8)
NEUTS SEG NFR BLD: 54 % (ref 32–75)
NRBC # BLD: 0 K/UL (ref 0–0.01)
NRBC BLD-RTO: 0 PER 100 WBC
PLATELET # BLD AUTO: 283 K/UL (ref 150–400)
PMV BLD AUTO: 10.8 FL (ref 8.9–12.9)
POTASSIUM SERPL-SCNC: 4.4 MMOL/L (ref 3.5–5.1)
PROT SERPL-MCNC: 7.2 G/DL (ref 6.4–8.2)
RBC # BLD AUTO: 3.89 M/UL (ref 3.8–5.2)
SAMPLES BEING HELD,HOLD: NORMAL
SODIUM SERPL-SCNC: 138 MMOL/L (ref 136–145)
TRIGL SERPL-MCNC: 78 MG/DL (ref ?–150)
VLDLC SERPL CALC-MCNC: 15.6 MG/DL
WBC # BLD AUTO: 6.6 K/UL (ref 3.6–11)

## 2023-02-15 ENCOUNTER — HOSPITAL ENCOUNTER (OUTPATIENT)
Dept: MAMMOGRAPHY | Age: 78
Discharge: HOME | End: 2023-02-15
Attending: PHYSICIAN ASSISTANT
Payer: MEDICARE

## 2023-02-15 DIAGNOSIS — Z12.31 VISIT FOR SCREENING MAMMOGRAM: ICD-10-CM

## 2023-02-15 PROCEDURE — 77063 BREAST TOMOSYNTHESIS BI: CPT

## 2023-05-27 PROBLEM — I44.7 LBBB (LEFT BUNDLE BRANCH BLOCK): Status: ACTIVE | Noted: 2023-05-27

## 2023-06-01 ENCOUNTER — OFFICE VISIT (OUTPATIENT)
Age: 78
End: 2023-06-01
Payer: MEDICARE

## 2023-06-01 VITALS
RESPIRATION RATE: 20 BRPM | SYSTOLIC BLOOD PRESSURE: 118 MMHG | HEIGHT: 63 IN | OXYGEN SATURATION: 99 % | DIASTOLIC BLOOD PRESSURE: 66 MMHG | WEIGHT: 149 LBS | HEART RATE: 69 BPM | TEMPERATURE: 97.7 F | BODY MASS INDEX: 26.4 KG/M2

## 2023-06-01 DIAGNOSIS — E78.2 MIXED HYPERLIPIDEMIA: ICD-10-CM

## 2023-06-01 DIAGNOSIS — I44.7 LBBB (LEFT BUNDLE BRANCH BLOCK): ICD-10-CM

## 2023-06-01 DIAGNOSIS — R09.89 RIGHT CAROTID BRUIT: ICD-10-CM

## 2023-06-01 DIAGNOSIS — I10 ESSENTIAL HYPERTENSION: Primary | ICD-10-CM

## 2023-06-01 PROCEDURE — 99214 OFFICE O/P EST MOD 30 MIN: CPT | Performed by: INTERNAL MEDICINE

## 2023-06-01 PROCEDURE — 3078F DIAST BP <80 MM HG: CPT | Performed by: INTERNAL MEDICINE

## 2023-06-01 PROCEDURE — 3074F SYST BP LT 130 MM HG: CPT | Performed by: INTERNAL MEDICINE

## 2023-06-01 PROCEDURE — 1123F ACP DISCUSS/DSCN MKR DOCD: CPT | Performed by: INTERNAL MEDICINE

## 2023-06-01 PROCEDURE — 93000 ELECTROCARDIOGRAM COMPLETE: CPT | Performed by: INTERNAL MEDICINE

## 2023-06-01 ASSESSMENT — PATIENT HEALTH QUESTIONNAIRE - PHQ9
SUM OF ALL RESPONSES TO PHQ QUESTIONS 1-9: 0
SUM OF ALL RESPONSES TO PHQ9 QUESTIONS 1 & 2: 0
2. FEELING DOWN, DEPRESSED OR HOPELESS: 0
SUM OF ALL RESPONSES TO PHQ QUESTIONS 1-9: 0
1. LITTLE INTEREST OR PLEASURE IN DOING THINGS: 0
SUM OF ALL RESPONSES TO PHQ QUESTIONS 1-9: 0
SUM OF ALL RESPONSES TO PHQ QUESTIONS 1-9: 0

## 2023-06-01 NOTE — PROGRESS NOTES
Identified pt with two pt identifiers(name and ). Reviewed record in preparation for visit and have obtained necessary documentation. Chief Complaint   Patient presents with    6 Month Follow-Up    Hypertension    Cholesterol Problem      /66 (Site: Left Upper Arm, Position: Sitting, Cuff Size: Medium Adult)   Pulse 69   Temp 97.7 °F (36.5 °C) (Temporal)   Resp 20   Ht 5' 3\" (1.6 m)   Wt 149 lb (67.6 kg)   SpO2 99%   BMI 26.39 kg/m²       Medications reviewed/approved by provider. Health Maintenance Review: Patient reminded of \"due or due soon\" health maintenance. I have asked the patient to contact his/her primary care provider (PCP) for follow-up on his/her health maintenance. Coordination of Care Questionnaire:  :   1) Have you been to an emergency room, urgent care, or hospitalized since your last visit? If yes, where when, and reason for visit? no       2. Have seen or consulted any other health care provider since your last visit? If yes, where when, and reason for visit?  no      Patient is accompanied by Self I have received verbal consent from Alex Cunningham to discuss any/all medical information while they are present in the room.

## 2023-06-19 ENCOUNTER — TELEPHONE (OUTPATIENT)
Age: 78
End: 2023-06-19

## 2023-06-19 NOTE — TELEPHONE ENCOUNTER
----- Message from Avril Resendez MD sent at 6/14/2023 12:57 PM EDT -----  Please inform Ms. Niru Leticia of her carotid duplex results. There is mild cholesterol plaque present but no significant or severe blockages. Continue current medications and add aspirin 81 mg daily. No other testing necessary at this time. Thanks!   EB

## 2023-08-07 ENCOUNTER — OFFICE VISIT (OUTPATIENT)
Dept: FAMILY MEDICINE CLINIC | Age: 78
End: 2023-08-07
Payer: MEDICARE

## 2023-08-07 VITALS
HEART RATE: 75 BPM | TEMPERATURE: 97 F | OXYGEN SATURATION: 98 % | HEIGHT: 63 IN | BODY MASS INDEX: 25.87 KG/M2 | DIASTOLIC BLOOD PRESSURE: 63 MMHG | RESPIRATION RATE: 18 BRPM | WEIGHT: 146 LBS | SYSTOLIC BLOOD PRESSURE: 110 MMHG

## 2023-08-07 DIAGNOSIS — D64.9 LOW HEMOGLOBIN: ICD-10-CM

## 2023-08-07 DIAGNOSIS — E78.2 MIXED HYPERLIPIDEMIA: ICD-10-CM

## 2023-08-07 DIAGNOSIS — I10 ESSENTIAL HYPERTENSION WITH GOAL BLOOD PRESSURE LESS THAN 140/90: Primary | ICD-10-CM

## 2023-08-07 DIAGNOSIS — F33.0 MAJOR DEPRESSIVE DISORDER, RECURRENT, MILD (HCC): ICD-10-CM

## 2023-08-07 PROCEDURE — G8399 PT W/DXA RESULTS DOCUMENT: HCPCS | Performed by: PHYSICIAN ASSISTANT

## 2023-08-07 PROCEDURE — 36415 COLL VENOUS BLD VENIPUNCTURE: CPT | Performed by: PHYSICIAN ASSISTANT

## 2023-08-07 PROCEDURE — G8427 DOCREV CUR MEDS BY ELIG CLIN: HCPCS | Performed by: PHYSICIAN ASSISTANT

## 2023-08-07 PROCEDURE — G8419 CALC BMI OUT NRM PARAM NOF/U: HCPCS | Performed by: PHYSICIAN ASSISTANT

## 2023-08-07 PROCEDURE — 1036F TOBACCO NON-USER: CPT | Performed by: PHYSICIAN ASSISTANT

## 2023-08-07 PROCEDURE — 99214 OFFICE O/P EST MOD 30 MIN: CPT | Performed by: PHYSICIAN ASSISTANT

## 2023-08-07 PROCEDURE — 3078F DIAST BP <80 MM HG: CPT | Performed by: PHYSICIAN ASSISTANT

## 2023-08-07 PROCEDURE — 1123F ACP DISCUSS/DSCN MKR DOCD: CPT | Performed by: PHYSICIAN ASSISTANT

## 2023-08-07 PROCEDURE — 1090F PRES/ABSN URINE INCON ASSESS: CPT | Performed by: PHYSICIAN ASSISTANT

## 2023-08-07 PROCEDURE — 3074F SYST BP LT 130 MM HG: CPT | Performed by: PHYSICIAN ASSISTANT

## 2023-08-07 RX ORDER — LOSARTAN POTASSIUM 100 MG/1
100 TABLET ORAL DAILY
Qty: 90 TABLET | Refills: 1 | Status: SHIPPED | OUTPATIENT
Start: 2023-08-07

## 2023-08-07 RX ORDER — FLUOXETINE HYDROCHLORIDE 20 MG/1
20 CAPSULE ORAL DAILY
Qty: 90 CAPSULE | Refills: 1 | Status: SHIPPED | OUTPATIENT
Start: 2023-08-07

## 2023-08-07 RX ORDER — ROSUVASTATIN CALCIUM 40 MG/1
40 TABLET, COATED ORAL DAILY
Qty: 90 TABLET | Refills: 1 | Status: SHIPPED | OUTPATIENT
Start: 2023-08-07

## 2023-08-07 RX ORDER — AMLODIPINE BESYLATE 5 MG/1
5 TABLET ORAL DAILY
Qty: 90 TABLET | Refills: 1 | Status: SHIPPED | OUTPATIENT
Start: 2023-08-07

## 2023-08-07 RX ORDER — FENOFIBRATE 145 MG/1
145 TABLET, COATED ORAL DAILY
Qty: 90 TABLET | Refills: 1 | Status: SHIPPED | OUTPATIENT
Start: 2023-08-07

## 2023-08-07 SDOH — ECONOMIC STABILITY: INCOME INSECURITY: HOW HARD IS IT FOR YOU TO PAY FOR THE VERY BASICS LIKE FOOD, HOUSING, MEDICAL CARE, AND HEATING?: NOT HARD AT ALL

## 2023-08-07 SDOH — ECONOMIC STABILITY: FOOD INSECURITY: WITHIN THE PAST 12 MONTHS, YOU WORRIED THAT YOUR FOOD WOULD RUN OUT BEFORE YOU GOT MONEY TO BUY MORE.: NEVER TRUE

## 2023-08-07 SDOH — ECONOMIC STABILITY: FOOD INSECURITY: WITHIN THE PAST 12 MONTHS, THE FOOD YOU BOUGHT JUST DIDN'T LAST AND YOU DIDN'T HAVE MONEY TO GET MORE.: NEVER TRUE

## 2023-08-07 SDOH — ECONOMIC STABILITY: HOUSING INSECURITY
IN THE LAST 12 MONTHS, WAS THERE A TIME WHEN YOU DID NOT HAVE A STEADY PLACE TO SLEEP OR SLEPT IN A SHELTER (INCLUDING NOW)?: NO

## 2023-08-07 ASSESSMENT — PATIENT HEALTH QUESTIONNAIRE - PHQ9
8. MOVING OR SPEAKING SO SLOWLY THAT OTHER PEOPLE COULD HAVE NOTICED. OR THE OPPOSITE, BEING SO FIGETY OR RESTLESS THAT YOU HAVE BEEN MOVING AROUND A LOT MORE THAN USUAL: 0
SUM OF ALL RESPONSES TO PHQ QUESTIONS 1-9: 0
1. LITTLE INTEREST OR PLEASURE IN DOING THINGS: 0
3. TROUBLE FALLING OR STAYING ASLEEP: 0
7. TROUBLE CONCENTRATING ON THINGS, SUCH AS READING THE NEWSPAPER OR WATCHING TELEVISION: 0
SUM OF ALL RESPONSES TO PHQ QUESTIONS 1-9: 0
4. FEELING TIRED OR HAVING LITTLE ENERGY: 0
10. IF YOU CHECKED OFF ANY PROBLEMS, HOW DIFFICULT HAVE THESE PROBLEMS MADE IT FOR YOU TO DO YOUR WORK, TAKE CARE OF THINGS AT HOME, OR GET ALONG WITH OTHER PEOPLE: 0
SUM OF ALL RESPONSES TO PHQ9 QUESTIONS 1 & 2: 0
5. POOR APPETITE OR OVEREATING: 0
6. FEELING BAD ABOUT YOURSELF - OR THAT YOU ARE A FAILURE OR HAVE LET YOURSELF OR YOUR FAMILY DOWN: 0
2. FEELING DOWN, DEPRESSED OR HOPELESS: 0
SUM OF ALL RESPONSES TO PHQ QUESTIONS 1-9: 0
SUM OF ALL RESPONSES TO PHQ QUESTIONS 1-9: 0
9. THOUGHTS THAT YOU WOULD BE BETTER OFF DEAD, OR OF HURTING YOURSELF: 0

## 2023-08-07 ASSESSMENT — ENCOUNTER SYMPTOMS
RESPIRATORY NEGATIVE: 1
COUGH: 0
SHORTNESS OF BREATH: 0

## 2023-08-07 NOTE — PROGRESS NOTES
Duane Barrios is a 78 y.o. female who presents to the office today with the following:  Chief Complaint   Patient presents with    Follow-up     6 month    Hypertension       Hypertension  Pertinent negatives include no chest pain or shortness of breath. Doing well on current regimen. Home readings similar to office, 110-120s/60-70s. No cardiac or neuro complaints. Pt just had visit with cardiology and had carotid dopplers demonstrating on mild plaque. Had updated EKG with LBBB but no other concerns. Depression  Well-managed on fluoxetine. No psych complaints/concerns. Pt doing well on current statin therapy. Has had good readings for several years now. Denies med SEs or myalgias. Is fasting today for labs, but will get annual lipids. Lab Results   Component Value Date/Time    CHOL 162 02/06/2023 08:30 AM    HDL 84 02/06/2023 08:30 AM     Lab Results   Component Value Date/Time    ALT 19 02/06/2023 08:30 AM    AST 23 02/06/2023 08:30 AM     Pt otherwise reports feeling well with no other complaints or acute concerns. Review of Systems   Constitutional:  Negative for chills and fever. Respiratory: Negative. Negative for cough and shortness of breath. Cardiovascular: Negative. Negative for chest pain. Neurological: Negative. Psychiatric/Behavioral:  Negative for behavioral problems, confusion, hallucinations, sleep disturbance and suicidal ideas. The patient is not nervous/anxious. See HPI.     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    Depression     Endocrine disorder     not current      Hyperlipidemia     Hypertension     Menopause     Metabolic syndrome     Vitamin D deficiency        Past Surgical History:   Procedure Laterality Date    BREAST BIOPSY Left     benign    BREAST SURGERY  8/2010    Breast BX; negative    COLONOSCOPY      nl, done in [place]    COLONOSCOPY  2018    polyp    COLONOSCOPY N/A 4/23/2018    COLONOSCOPY POSSIBLE

## 2023-08-08 LAB
ANION GAP SERPL CALC-SCNC: 5 MMOL/L (ref 5–15)
BASOPHILS # BLD: 0 K/UL (ref 0–0.1)
BASOPHILS NFR BLD: 1 % (ref 0–1)
BUN SERPL-MCNC: 22 MG/DL (ref 6–20)
BUN/CREAT SERPL: 20 (ref 12–20)
CALCIUM SERPL-MCNC: 10.3 MG/DL (ref 8.5–10.1)
CHLORIDE SERPL-SCNC: 111 MMOL/L (ref 97–108)
CO2 SERPL-SCNC: 23 MMOL/L (ref 21–32)
CREAT SERPL-MCNC: 1.12 MG/DL (ref 0.55–1.02)
DIFFERENTIAL METHOD BLD: ABNORMAL
EOSINOPHIL # BLD: 0.3 K/UL (ref 0–0.4)
EOSINOPHIL NFR BLD: 6 % (ref 0–7)
ERYTHROCYTE [DISTWIDTH] IN BLOOD BY AUTOMATED COUNT: 14.3 % (ref 11.5–14.5)
GLUCOSE SERPL-MCNC: 80 MG/DL (ref 65–100)
HCT VFR BLD AUTO: 35.7 % (ref 35–47)
HGB BLD-MCNC: 10.7 G/DL (ref 11.5–16)
IMM GRANULOCYTES # BLD AUTO: 0 K/UL (ref 0–0.04)
IMM GRANULOCYTES NFR BLD AUTO: 1 % (ref 0–0.5)
LYMPHOCYTES # BLD: 1.9 K/UL (ref 0.8–3.5)
LYMPHOCYTES NFR BLD: 33 % (ref 12–49)
MCH RBC QN AUTO: 27.8 PG (ref 26–34)
MCHC RBC AUTO-ENTMCNC: 30 G/DL (ref 30–36.5)
MCV RBC AUTO: 92.7 FL (ref 80–99)
MONOCYTES # BLD: 0.3 K/UL (ref 0–1)
MONOCYTES NFR BLD: 6 % (ref 5–13)
NEUTS SEG # BLD: 3.1 K/UL (ref 1.8–8)
NEUTS SEG NFR BLD: 53 % (ref 32–75)
NRBC # BLD: 0 K/UL (ref 0–0.01)
NRBC BLD-RTO: 0 PER 100 WBC
PLATELET # BLD AUTO: 269 K/UL (ref 150–400)
PMV BLD AUTO: 11.1 FL (ref 8.9–12.9)
POTASSIUM SERPL-SCNC: 4.5 MMOL/L (ref 3.5–5.1)
RBC # BLD AUTO: 3.85 M/UL (ref 3.8–5.2)
SODIUM SERPL-SCNC: 139 MMOL/L (ref 136–145)
WBC # BLD AUTO: 5.7 K/UL (ref 3.6–11)

## 2023-10-16 ENCOUNTER — OFFICE VISIT (OUTPATIENT)
Dept: FAMILY MEDICINE CLINIC | Age: 78
End: 2023-10-16
Payer: MEDICARE

## 2023-10-16 VITALS
HEART RATE: 57 BPM | WEIGHT: 146 LBS | BODY MASS INDEX: 25.87 KG/M2 | HEIGHT: 63 IN | SYSTOLIC BLOOD PRESSURE: 162 MMHG | DIASTOLIC BLOOD PRESSURE: 74 MMHG | RESPIRATION RATE: 18 BRPM | OXYGEN SATURATION: 97 %

## 2023-10-16 DIAGNOSIS — S46.212A STRAIN OF LEFT BICEPS, INITIAL ENCOUNTER: ICD-10-CM

## 2023-10-16 DIAGNOSIS — M79.602 LEFT ARM PAIN: Primary | ICD-10-CM

## 2023-10-16 PROCEDURE — 99213 OFFICE O/P EST LOW 20 MIN: CPT | Performed by: PHYSICIAN ASSISTANT

## 2023-10-16 PROCEDURE — 1090F PRES/ABSN URINE INCON ASSESS: CPT | Performed by: PHYSICIAN ASSISTANT

## 2023-10-16 PROCEDURE — 1036F TOBACCO NON-USER: CPT | Performed by: PHYSICIAN ASSISTANT

## 2023-10-16 PROCEDURE — G8399 PT W/DXA RESULTS DOCUMENT: HCPCS | Performed by: PHYSICIAN ASSISTANT

## 2023-10-16 PROCEDURE — 3078F DIAST BP <80 MM HG: CPT | Performed by: PHYSICIAN ASSISTANT

## 2023-10-16 PROCEDURE — G8484 FLU IMMUNIZE NO ADMIN: HCPCS | Performed by: PHYSICIAN ASSISTANT

## 2023-10-16 PROCEDURE — G8427 DOCREV CUR MEDS BY ELIG CLIN: HCPCS | Performed by: PHYSICIAN ASSISTANT

## 2023-10-16 PROCEDURE — G8419 CALC BMI OUT NRM PARAM NOF/U: HCPCS | Performed by: PHYSICIAN ASSISTANT

## 2023-10-16 PROCEDURE — 1123F ACP DISCUSS/DSCN MKR DOCD: CPT | Performed by: PHYSICIAN ASSISTANT

## 2023-10-16 PROCEDURE — 3077F SYST BP >= 140 MM HG: CPT | Performed by: PHYSICIAN ASSISTANT

## 2023-10-16 SDOH — ECONOMIC STABILITY: INCOME INSECURITY: HOW HARD IS IT FOR YOU TO PAY FOR THE VERY BASICS LIKE FOOD, HOUSING, MEDICAL CARE, AND HEATING?: NOT HARD AT ALL

## 2023-10-16 SDOH — ECONOMIC STABILITY: FOOD INSECURITY: WITHIN THE PAST 12 MONTHS, YOU WORRIED THAT YOUR FOOD WOULD RUN OUT BEFORE YOU GOT MONEY TO BUY MORE.: NEVER TRUE

## 2023-10-16 SDOH — ECONOMIC STABILITY: FOOD INSECURITY: WITHIN THE PAST 12 MONTHS, THE FOOD YOU BOUGHT JUST DIDN'T LAST AND YOU DIDN'T HAVE MONEY TO GET MORE.: NEVER TRUE

## 2023-10-16 NOTE — PROGRESS NOTES
tablet 1    acetaminophen (TYLENOL) 500 MG tablet Take by mouth every 6 hours as needed      Cholecalciferol 50 MCG (2000 UT) CAPS Take by mouth daily       No current facility-administered medications for this visit. Social History     Socioeconomic History    Marital status: Single     Spouse name: None    Number of children: None    Years of education: None    Highest education level: None   Tobacco Use    Smoking status: Never     Passive exposure: Never    Smokeless tobacco: Never   Vaping Use    Vaping Use: Never used   Substance and Sexual Activity    Alcohol use: Yes     Alcohol/week: 0.0 standard drinks of alcohol    Drug use: No    Sexual activity: Not Currently     Social Determinants of Health     Financial Resource Strain: Low Risk  (10/16/2023)    Overall Financial Resource Strain (CARDIA)     Difficulty of Paying Living Expenses: Not hard at all   Food Insecurity: No Food Insecurity (10/16/2023)    Hunger Vital Sign     Worried About Running Out of Food in the Last Year: Never true     Ran Out of Food in the Last Year: Never true   Transportation Needs: Unknown (10/16/2023)    PRAPARE - Transportation     Lack of Transportation (Non-Medical): No   Housing Stability: Unknown (8/7/2023)    Housing Stability Vital Sign     Unstable Housing in the Last Year: No       Family History   Problem Relation Age of Onset    Hypertension Brother     Elevated Lipids Brother     Breast Cancer Maternal Aunt     No Known Problems Mother          Physical Exam:  Vitals:    10/16/23 0957   BP: (!) 162/74   Pulse: 57   Resp: 18   SpO2: 97%     Physical Exam  Vitals and nursing note reviewed. Constitutional:       Appearance: Normal appearance. HENT:      Head: Normocephalic and atraumatic. Cardiovascular:      Rate and Rhythm: Normal rate and regular rhythm. Pulses: Normal pulses. Heart sounds: Normal heart sounds.    Pulmonary:      Effort: Pulmonary effort is normal.      Breath sounds: Normal

## 2023-11-07 ENCOUNTER — HOSPITAL ENCOUNTER (OUTPATIENT)
Facility: HOSPITAL | Age: 78
Discharge: HOME | End: 2023-11-10
Payer: MEDICARE

## 2023-11-07 DIAGNOSIS — M79.602 LEFT ARM PAIN: ICD-10-CM

## 2023-11-07 PROCEDURE — 73060 X-RAY EXAM OF HUMERUS: CPT

## 2023-11-13 ENCOUNTER — HOSPITAL ENCOUNTER (OUTPATIENT)
Facility: HOSPITAL | Age: 78
Discharge: HOME | End: 2023-11-16
Payer: MEDICARE

## 2023-11-13 ENCOUNTER — OFFICE VISIT (OUTPATIENT)
Dept: FAMILY MEDICINE CLINIC | Age: 78
End: 2023-11-13
Payer: MEDICARE

## 2023-11-13 VITALS
HEART RATE: 69 BPM | SYSTOLIC BLOOD PRESSURE: 102 MMHG | DIASTOLIC BLOOD PRESSURE: 65 MMHG | HEIGHT: 63 IN | WEIGHT: 149.4 LBS | OXYGEN SATURATION: 98 % | TEMPERATURE: 98.4 F | RESPIRATION RATE: 14 BRPM | BODY MASS INDEX: 26.47 KG/M2

## 2023-11-13 DIAGNOSIS — S46.212A STRAIN OF LEFT BICEPS, INITIAL ENCOUNTER: ICD-10-CM

## 2023-11-13 DIAGNOSIS — M79.602 LEFT ARM PAIN: ICD-10-CM

## 2023-11-13 DIAGNOSIS — M79.602 LEFT ARM PAIN: Primary | ICD-10-CM

## 2023-11-13 DIAGNOSIS — M25.512 LEFT SHOULDER PAIN, UNSPECIFIED CHRONICITY: ICD-10-CM

## 2023-11-13 DIAGNOSIS — S46.212D STRAIN OF LEFT BICEPS, SUBSEQUENT ENCOUNTER: ICD-10-CM

## 2023-11-13 DIAGNOSIS — M25.612 DECREASED ROM OF LEFT SHOULDER: ICD-10-CM

## 2023-11-13 PROCEDURE — 73030 X-RAY EXAM OF SHOULDER: CPT

## 2023-11-13 PROCEDURE — 99213 OFFICE O/P EST LOW 20 MIN: CPT | Performed by: PHYSICIAN ASSISTANT

## 2023-11-13 PROCEDURE — G8399 PT W/DXA RESULTS DOCUMENT: HCPCS | Performed by: PHYSICIAN ASSISTANT

## 2023-11-13 PROCEDURE — G8419 CALC BMI OUT NRM PARAM NOF/U: HCPCS | Performed by: PHYSICIAN ASSISTANT

## 2023-11-13 PROCEDURE — 1123F ACP DISCUSS/DSCN MKR DOCD: CPT | Performed by: PHYSICIAN ASSISTANT

## 2023-11-13 PROCEDURE — G8428 CUR MEDS NOT DOCUMENT: HCPCS | Performed by: PHYSICIAN ASSISTANT

## 2023-11-13 PROCEDURE — G8484 FLU IMMUNIZE NO ADMIN: HCPCS | Performed by: PHYSICIAN ASSISTANT

## 2023-11-13 PROCEDURE — 1090F PRES/ABSN URINE INCON ASSESS: CPT | Performed by: PHYSICIAN ASSISTANT

## 2023-11-13 PROCEDURE — 1036F TOBACCO NON-USER: CPT | Performed by: PHYSICIAN ASSISTANT

## 2023-11-13 PROCEDURE — 3078F DIAST BP <80 MM HG: CPT | Performed by: PHYSICIAN ASSISTANT

## 2023-11-13 PROCEDURE — 3074F SYST BP LT 130 MM HG: CPT | Performed by: PHYSICIAN ASSISTANT

## 2024-01-19 DIAGNOSIS — I10 ESSENTIAL HYPERTENSION WITH GOAL BLOOD PRESSURE LESS THAN 140/90: ICD-10-CM

## 2024-01-22 RX ORDER — AMLODIPINE BESYLATE 5 MG/1
5 TABLET ORAL DAILY
Qty: 90 TABLET | Refills: 1 | Status: SHIPPED | OUTPATIENT
Start: 2024-01-22

## 2024-01-31 ENCOUNTER — TRANSCRIBE ORDERS (OUTPATIENT)
Facility: HOSPITAL | Age: 79
End: 2024-01-31

## 2024-01-31 DIAGNOSIS — Z12.31 SCREENING MAMMOGRAM FOR BREAST CANCER: Primary | ICD-10-CM

## 2024-02-07 ENCOUNTER — OFFICE VISIT (OUTPATIENT)
Dept: FAMILY MEDICINE CLINIC | Age: 79
End: 2024-02-07
Payer: MEDICARE

## 2024-02-07 VITALS
SYSTOLIC BLOOD PRESSURE: 88 MMHG | HEIGHT: 63 IN | OXYGEN SATURATION: 97 % | TEMPERATURE: 98.1 F | HEART RATE: 70 BPM | WEIGHT: 146.4 LBS | BODY MASS INDEX: 25.94 KG/M2 | RESPIRATION RATE: 16 BRPM | DIASTOLIC BLOOD PRESSURE: 52 MMHG

## 2024-02-07 DIAGNOSIS — E78.2 MIXED HYPERLIPIDEMIA: ICD-10-CM

## 2024-02-07 DIAGNOSIS — Z00.00 MEDICARE ANNUAL WELLNESS VISIT, SUBSEQUENT: ICD-10-CM

## 2024-02-07 DIAGNOSIS — F33.0 MAJOR DEPRESSIVE DISORDER, RECURRENT, MILD (HCC): ICD-10-CM

## 2024-02-07 DIAGNOSIS — I10 ESSENTIAL HYPERTENSION WITH GOAL BLOOD PRESSURE LESS THAN 140/90: Primary | ICD-10-CM

## 2024-02-07 LAB
ALBUMIN SERPL-MCNC: 3.9 G/DL (ref 3.5–5)
ALBUMIN/GLOB SERPL: 1.2 (ref 1.1–2.2)
ALP SERPL-CCNC: 43 U/L (ref 45–117)
ALT SERPL-CCNC: 20 U/L (ref 12–78)
ANION GAP SERPL CALC-SCNC: 4 MMOL/L (ref 5–15)
AST SERPL-CCNC: 20 U/L (ref 15–37)
BILIRUB SERPL-MCNC: 0.4 MG/DL (ref 0.2–1)
BUN SERPL-MCNC: 17 MG/DL (ref 6–20)
BUN/CREAT SERPL: 15 (ref 12–20)
CALCIUM SERPL-MCNC: 10.8 MG/DL (ref 8.5–10.1)
CHLORIDE SERPL-SCNC: 109 MMOL/L (ref 97–108)
CHOLEST SERPL-MCNC: 171 MG/DL
CO2 SERPL-SCNC: 26 MMOL/L (ref 21–32)
COMMENT:: NORMAL
CREAT SERPL-MCNC: 1.1 MG/DL (ref 0.55–1.02)
GLOBULIN SER CALC-MCNC: 3.3 G/DL (ref 2–4)
GLUCOSE SERPL-MCNC: 86 MG/DL (ref 65–100)
HDLC SERPL-MCNC: 100 MG/DL
HDLC SERPL: 1.7 (ref 0–5)
LDLC SERPL CALC-MCNC: 58.8 MG/DL (ref 0–100)
POTASSIUM SERPL-SCNC: 4.5 MMOL/L (ref 3.5–5.1)
PROT SERPL-MCNC: 7.2 G/DL (ref 6.4–8.2)
SODIUM SERPL-SCNC: 139 MMOL/L (ref 136–145)
SPECIMEN HOLD: NORMAL
TRIGL SERPL-MCNC: 61 MG/DL
VLDLC SERPL CALC-MCNC: 12.2 MG/DL

## 2024-02-07 PROCEDURE — G0439 PPPS, SUBSEQ VISIT: HCPCS | Performed by: PHYSICIAN ASSISTANT

## 2024-02-07 PROCEDURE — 99214 OFFICE O/P EST MOD 30 MIN: CPT | Performed by: PHYSICIAN ASSISTANT

## 2024-02-07 PROCEDURE — 1090F PRES/ABSN URINE INCON ASSESS: CPT | Performed by: PHYSICIAN ASSISTANT

## 2024-02-07 PROCEDURE — G8399 PT W/DXA RESULTS DOCUMENT: HCPCS | Performed by: PHYSICIAN ASSISTANT

## 2024-02-07 PROCEDURE — 1123F ACP DISCUSS/DSCN MKR DOCD: CPT | Performed by: PHYSICIAN ASSISTANT

## 2024-02-07 PROCEDURE — G8427 DOCREV CUR MEDS BY ELIG CLIN: HCPCS | Performed by: PHYSICIAN ASSISTANT

## 2024-02-07 PROCEDURE — 36415 COLL VENOUS BLD VENIPUNCTURE: CPT | Performed by: PHYSICIAN ASSISTANT

## 2024-02-07 PROCEDURE — 3074F SYST BP LT 130 MM HG: CPT | Performed by: PHYSICIAN ASSISTANT

## 2024-02-07 PROCEDURE — G8484 FLU IMMUNIZE NO ADMIN: HCPCS | Performed by: PHYSICIAN ASSISTANT

## 2024-02-07 PROCEDURE — 3078F DIAST BP <80 MM HG: CPT | Performed by: PHYSICIAN ASSISTANT

## 2024-02-07 PROCEDURE — 1036F TOBACCO NON-USER: CPT | Performed by: PHYSICIAN ASSISTANT

## 2024-02-07 PROCEDURE — G8419 CALC BMI OUT NRM PARAM NOF/U: HCPCS | Performed by: PHYSICIAN ASSISTANT

## 2024-02-07 RX ORDER — AMLODIPINE BESYLATE 5 MG/1
5 TABLET ORAL DAILY
Qty: 90 TABLET | Refills: 1 | Status: SHIPPED | OUTPATIENT
Start: 2024-02-07

## 2024-02-07 RX ORDER — ROSUVASTATIN CALCIUM 40 MG/1
40 TABLET, COATED ORAL DAILY
Qty: 90 TABLET | Refills: 1 | Status: SHIPPED | OUTPATIENT
Start: 2024-02-07

## 2024-02-07 RX ORDER — FLUOXETINE HYDROCHLORIDE 20 MG/1
20 CAPSULE ORAL DAILY
Qty: 90 CAPSULE | Refills: 1 | Status: SHIPPED | OUTPATIENT
Start: 2024-02-07

## 2024-02-07 RX ORDER — LOSARTAN POTASSIUM 100 MG/1
100 TABLET ORAL DAILY
Qty: 90 TABLET | Refills: 1 | Status: SHIPPED | OUTPATIENT
Start: 2024-02-07

## 2024-02-07 RX ORDER — FENOFIBRATE 145 MG/1
145 TABLET, COATED ORAL DAILY
Qty: 90 TABLET | Refills: 1 | Status: SHIPPED | OUTPATIENT
Start: 2024-02-07

## 2024-02-07 ASSESSMENT — PATIENT HEALTH QUESTIONNAIRE - PHQ9
SUM OF ALL RESPONSES TO PHQ9 QUESTIONS 1 & 2: 0
8. MOVING OR SPEAKING SO SLOWLY THAT OTHER PEOPLE COULD HAVE NOTICED. OR THE OPPOSITE, BEING SO FIGETY OR RESTLESS THAT YOU HAVE BEEN MOVING AROUND A LOT MORE THAN USUAL: 0
6. FEELING BAD ABOUT YOURSELF - OR THAT YOU ARE A FAILURE OR HAVE LET YOURSELF OR YOUR FAMILY DOWN: 0
10. IF YOU CHECKED OFF ANY PROBLEMS, HOW DIFFICULT HAVE THESE PROBLEMS MADE IT FOR YOU TO DO YOUR WORK, TAKE CARE OF THINGS AT HOME, OR GET ALONG WITH OTHER PEOPLE: 0
SUM OF ALL RESPONSES TO PHQ QUESTIONS 1-9: 0
SUM OF ALL RESPONSES TO PHQ QUESTIONS 1-9: 0
5. POOR APPETITE OR OVEREATING: 0
9. THOUGHTS THAT YOU WOULD BE BETTER OFF DEAD, OR OF HURTING YOURSELF: 0
2. FEELING DOWN, DEPRESSED OR HOPELESS: 0
1. LITTLE INTEREST OR PLEASURE IN DOING THINGS: 0
3. TROUBLE FALLING OR STAYING ASLEEP: 0
4. FEELING TIRED OR HAVING LITTLE ENERGY: 0
7. TROUBLE CONCENTRATING ON THINGS, SUCH AS READING THE NEWSPAPER OR WATCHING TELEVISION: 0
SUM OF ALL RESPONSES TO PHQ QUESTIONS 1-9: 0
SUM OF ALL RESPONSES TO PHQ QUESTIONS 1-9: 0

## 2024-02-07 ASSESSMENT — LIFESTYLE VARIABLES
HOW OFTEN DO YOU HAVE A DRINK CONTAINING ALCOHOL: 2-3 TIMES A WEEK
HOW MANY STANDARD DRINKS CONTAINING ALCOHOL DO YOU HAVE ON A TYPICAL DAY: 1 OR 2

## 2024-02-07 NOTE — PATIENT INSTRUCTIONS
every 2 to 3 hours or after sweating, drying off with a towel, or swimming.  Always wear a seat belt when traveling in a car. Always wear a helmet when riding a bicycle or motorcycle.

## 2024-02-07 NOTE — PROGRESS NOTES
\"Have you been to the ER, urgent care clinic since your last visit?  Hospitalized since your last visit?\"    NO    “Have you seen or consulted any other health care providers outside of [facility] since your last visit?”    YES - When: approximately 3 months ago.  Where and Why: Ortho for shoulder Dr Ba at .       Successful venipuncture in patient's Left AC X 1 sticks.  The patient tolerated this procedure w/o c/o pain or discomfort.

## 2024-02-07 NOTE — PROGRESS NOTES
Medicare Annual Wellness Visit    Nydia Aburto is here for Medicare AWV (/And 6 month follow up)    Assessment & Plan   Essential hypertension with goal blood pressure less than 140/90  -     amLODIPine (NORVASC) 5 MG tablet; Take 1 tablet by mouth daily, Disp-90 tablet, R-1Normal  -     losartan (COZAAR) 100 MG tablet; Take 1 tablet by mouth daily, Disp-90 tablet, R-1Normal  -     Comprehensive Metabolic Panel; Future  -     IN COLLECTION VENOUS BLOOD VENIPUNCTURE  Medicare annual wellness visit, subsequent  Mixed hyperlipidemia  -     fenofibrate (TRICOR) 145 MG tablet; Take 1 tablet by mouth daily, Disp-90 tablet, R-1This prescription was filled on 12/18/2023. Any refills authorized will be placed on file.Normal  -     rosuvastatin (CRESTOR) 40 MG tablet; Take 1 tablet by mouth daily, Disp-90 tablet, R-1Normal  -     Comprehensive Metabolic Panel; Future  -     Lipid Panel; Future  Major depressive disorder, recurrent, mild (HCC)  -     FLUoxetine (PROZAC) 20 MG capsule; Take 1 capsule by mouth daily, Disp-90 capsule, R-1Normal  Two variable readings on BP. Recommend pt resume home checks and f/up if BP consistently high or low at home.  As of now she reports feeling great and has no concerns or symptoms. Will continue current regimen and routine 6 mo f/up otherwise.  Rtc sooner prn.    Recommendations for Preventive Services Due: see orders and patient instructions/AVS.  Recommended screening schedule for the next 5-10 years is provided to the patient in written form: see Patient Instructions/AVS.     No follow-ups on file.     Subjective   The following acute and/or chronic problems were also addressed today:    HTN  No checks at home.   A bit high in office, but pt says she was talking about something stressful.  No cardiac complaints.  Tolerating medication well.    H/o depression.  Well-managed on fluoxetine.  Denies psych symptoms.    Pt doing well on current statin therapy, rosuvastatin and

## 2024-02-16 ENCOUNTER — HOSPITAL ENCOUNTER (OUTPATIENT)
Facility: HOSPITAL | Age: 79
End: 2024-02-16
Payer: MEDICARE

## 2024-02-16 DIAGNOSIS — Z12.31 SCREENING MAMMOGRAM FOR BREAST CANCER: ICD-10-CM

## 2024-02-16 PROCEDURE — 77063 BREAST TOMOSYNTHESIS BI: CPT

## 2024-03-07 ENCOUNTER — OFFICE VISIT (OUTPATIENT)
Dept: FAMILY MEDICINE CLINIC | Age: 79
End: 2024-03-07
Payer: MEDICARE

## 2024-03-07 VITALS
OXYGEN SATURATION: 96 % | HEIGHT: 63 IN | DIASTOLIC BLOOD PRESSURE: 72 MMHG | SYSTOLIC BLOOD PRESSURE: 118 MMHG | BODY MASS INDEX: 25.73 KG/M2 | HEART RATE: 67 BPM | RESPIRATION RATE: 16 BRPM | WEIGHT: 145.2 LBS | TEMPERATURE: 97.8 F

## 2024-03-07 DIAGNOSIS — E83.52 SERUM CALCIUM ELEVATED: ICD-10-CM

## 2024-03-07 DIAGNOSIS — L98.9 SKIN LESION OF FACE: Primary | ICD-10-CM

## 2024-03-07 DIAGNOSIS — I10 ESSENTIAL HYPERTENSION WITH GOAL BLOOD PRESSURE LESS THAN 140/90: ICD-10-CM

## 2024-03-07 PROCEDURE — 1090F PRES/ABSN URINE INCON ASSESS: CPT | Performed by: PHYSICIAN ASSISTANT

## 2024-03-07 PROCEDURE — 3074F SYST BP LT 130 MM HG: CPT | Performed by: PHYSICIAN ASSISTANT

## 2024-03-07 PROCEDURE — G8484 FLU IMMUNIZE NO ADMIN: HCPCS | Performed by: PHYSICIAN ASSISTANT

## 2024-03-07 PROCEDURE — G8399 PT W/DXA RESULTS DOCUMENT: HCPCS | Performed by: PHYSICIAN ASSISTANT

## 2024-03-07 PROCEDURE — 1036F TOBACCO NON-USER: CPT | Performed by: PHYSICIAN ASSISTANT

## 2024-03-07 PROCEDURE — G8428 CUR MEDS NOT DOCUMENT: HCPCS | Performed by: PHYSICIAN ASSISTANT

## 2024-03-07 PROCEDURE — 3078F DIAST BP <80 MM HG: CPT | Performed by: PHYSICIAN ASSISTANT

## 2024-03-07 PROCEDURE — 36415 COLL VENOUS BLD VENIPUNCTURE: CPT | Performed by: PHYSICIAN ASSISTANT

## 2024-03-07 PROCEDURE — 99214 OFFICE O/P EST MOD 30 MIN: CPT | Performed by: PHYSICIAN ASSISTANT

## 2024-03-07 PROCEDURE — 1123F ACP DISCUSS/DSCN MKR DOCD: CPT | Performed by: PHYSICIAN ASSISTANT

## 2024-03-07 PROCEDURE — G8419 CALC BMI OUT NRM PARAM NOF/U: HCPCS | Performed by: PHYSICIAN ASSISTANT

## 2024-03-07 RX ORDER — AMLODIPINE BESYLATE 10 MG/1
10 TABLET ORAL DAILY
Qty: 90 TABLET | Refills: 0 | Status: SHIPPED | OUTPATIENT
Start: 2024-03-07

## 2024-03-07 ASSESSMENT — PATIENT HEALTH QUESTIONNAIRE - PHQ9
SUM OF ALL RESPONSES TO PHQ QUESTIONS 1-9: 1
2. FEELING DOWN, DEPRESSED OR HOPELESS: 1
1. LITTLE INTEREST OR PLEASURE IN DOING THINGS: 0
SUM OF ALL RESPONSES TO PHQ9 QUESTIONS 1 & 2: 1
SUM OF ALL RESPONSES TO PHQ QUESTIONS 1-9: 1

## 2024-03-07 NOTE — PROGRESS NOTES
\"Have you been to the ER, urgent care clinic since your last visit?  Hospitalized since your last visit?\"    NO    “Have you seen or consulted any other health care providers outside of [facility] since your last visit?”    NO       Successful venipuncture in patient's left ac X 1 sticks.  The patient tolerated this procedure w/o c/o pain or discomfort.

## 2024-03-07 NOTE — PROGRESS NOTES
Nydia Aburto is a 78 y.o. female who presents to the office today with the following:  Chief Complaint   Patient presents with    Medication Refill     And follow up high CA in [place]       Medication Refill      Pt does not need refills, thought she would need more since I will not be available at her f/up, but she is good until August and will see another provider then.    Pt does need further labs to further eval elevated Calcium.  Asymptomatic.   Lab Results   Component Value Date     02/07/2024    K 4.5 02/07/2024     (H) 02/07/2024    CO2 26 02/07/2024    BUN 17 02/07/2024    CREATININE 1.10 (H) 02/07/2024    GLUCOSE 86 02/07/2024    CALCIUM 10.8 (H) 02/07/2024    PROT 7.2 02/07/2024    LABALBU 3.9 02/07/2024    BILITOT 0.4 02/07/2024    ALKPHOS 43 (L) 02/07/2024    AST 20 02/07/2024    ALT 20 02/07/2024    LABGLOM 51 (L) 02/07/2024    GFRAA >60 08/03/2022    AGRATIO 1.2 02/07/2024    GLOB 3.3 02/07/2024       UTD on CA screenings (mammo and colon).    She also would like for me to check a spot on her face and scalp.  Spot on face started only in the last few days.  Looked like a zit and popped it, now its bigger and more tender.  No systemic symptoms or other lesions.   Has been over 3 years since seen by her Derm. Needs new referral.  Has had skin can over her lip, unsure type.     Spot on scalp is just a scab from a fall about 1-2 weeks ago.  Denies LOC, HA, or other injuries. Did catch herself.   Just wanted it looked at it but is not really bothersome. No other complaints today.    Also BP has been up.  Is not checking at home, though rec before.  No cardiac complaints.  On amlodipine 5 mg daily and losartan 100 mg. No med SE.    Review of Systems    See HPI.    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    Depression     Endocrine disorder     not current      Hyperlipidemia     Hypertension     Menopause     Metabolic syndrome     Vitamin D deficiency        Past

## 2024-03-08 ENCOUNTER — CLINICAL DOCUMENTATION (OUTPATIENT)
Dept: FAMILY MEDICINE CLINIC | Age: 79
End: 2024-03-08

## 2024-03-08 LAB
CALCIUM SERPL-MCNC: 10.8 MG/DL (ref 8.5–10.1)
PTH-INTACT SERPL-MCNC: 30.5 PG/ML (ref 18.4–88)

## 2024-03-10 LAB — CA-I SERPL ISE-MCNC: 5.6 MG/DL (ref 4.5–5.6)

## 2024-04-30 ENCOUNTER — OFFICE VISIT (OUTPATIENT)
Dept: FAMILY MEDICINE CLINIC | Age: 79
End: 2024-04-30

## 2024-04-30 VITALS
BODY MASS INDEX: 26.58 KG/M2 | TEMPERATURE: 97.2 F | WEIGHT: 150 LBS | HEART RATE: 74 BPM | RESPIRATION RATE: 12 BRPM | OXYGEN SATURATION: 97 % | HEIGHT: 63 IN | SYSTOLIC BLOOD PRESSURE: 124 MMHG | DIASTOLIC BLOOD PRESSURE: 68 MMHG

## 2024-04-30 DIAGNOSIS — T14.8XXA HEMATOMA: ICD-10-CM

## 2024-04-30 DIAGNOSIS — I10 PRIMARY HYPERTENSION: ICD-10-CM

## 2024-04-30 DIAGNOSIS — S81.012A LACERATION OF LEFT KNEE, INITIAL ENCOUNTER: Primary | ICD-10-CM

## 2024-04-30 DIAGNOSIS — R60.0 LOCALIZED EDEMA: ICD-10-CM

## 2024-04-30 RX ORDER — AMLODIPINE BESYLATE 5 MG/1
5 TABLET ORAL DAILY
Qty: 30 TABLET | Refills: 0 | Status: SHIPPED | OUTPATIENT
Start: 2024-04-30

## 2024-04-30 RX ORDER — HYDROCHLOROTHIAZIDE 12.5 MG/1
12.5 CAPSULE, GELATIN COATED ORAL EVERY MORNING
Qty: 30 CAPSULE | Refills: 0 | Status: SHIPPED | OUTPATIENT
Start: 2024-04-30

## 2024-04-30 ASSESSMENT — PATIENT HEALTH QUESTIONNAIRE - PHQ9
SUM OF ALL RESPONSES TO PHQ QUESTIONS 1-9: 0
2. FEELING DOWN, DEPRESSED OR HOPELESS: NOT AT ALL
SUM OF ALL RESPONSES TO PHQ QUESTIONS 1-9: 0
SUM OF ALL RESPONSES TO PHQ9 QUESTIONS 1 & 2: 0
1. LITTLE INTEREST OR PLEASURE IN DOING THINGS: NOT AT ALL

## 2024-04-30 NOTE — PROGRESS NOTES
Nydia Aburto is a 78 y.o. female who presents to the office today with the following:  Chief Complaint   Patient presents with    Fall     Contusions on knees, on 4/29/24    Foot Swelling     Bilateral, x 1 week       Fall    Foot Swelling       Swelling in legs started a week ago  Better in am  No travel  Not sedentary    Then fell last night going up a step  Fell onto deck onto knees  Got bleeding on left knee  And bruising  Taking ASA 81    Denies being at risk of falling    HTN  On Amlodipine 10  And Losartan  Never tried HCTZ      Review of Systems    See HPI.    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    Depression     Endocrine disorder     not current      Hyperlipidemia     Hypertension     Menopause     Metabolic syndrome     Vitamin D deficiency        Past Surgical History:   Procedure Laterality Date    BREAST BIOPSY Left     benign    BREAST SURGERY  8/2010    Breast BX; negative    COLONOSCOPY      nl, done in [place]    COLONOSCOPY  2018    polyp    COLONOSCOPY N/A 4/23/2018    COLONOSCOPY POSSIBLE DILATION performed by Ev Michaels MD at [facility] MAIN OR    GYN  1996    PAUL & BSO     HYSTERECTOMY (CERVIX STATUS UNKNOWN)      age 53    OVARY REMOVAL      UPPER GASTROINTESTINAL ENDOSCOPY  2018       Allergies   Allergen Reactions    Ace Inhibitors Cough    Atorvastatin Myalgia       Current Outpatient Medications   Medication Sig Dispense Refill    amLODIPine (NORVASC) 5 MG tablet Take 1 tablet by mouth daily 30 tablet 0    hydroCHLOROthiazide 12.5 MG capsule Take 1 capsule by mouth every morning 30 capsule 0    ASPIRIN 81 PO Take 814 mg by mouth daily      fenofibrate (TRICOR) 145 MG tablet Take 1 tablet by mouth daily 90 tablet 1    FLUoxetine (PROZAC) 20 MG capsule Take 1 capsule by mouth daily 90 capsule 1    losartan (COZAAR) 100 MG tablet Take 1 tablet by mouth daily 90 tablet 1    rosuvastatin (CRESTOR) 40 MG tablet Take 1 tablet by mouth daily 90 tablet 1

## 2024-04-30 NOTE — PROGRESS NOTES
\"Have you been to the ER, urgent care clinic since your last visit?  Hospitalized since your last visit?\"    NO    “Have you seen or consulted any other health care providers outside of [facility] since your last visit?”    NO            Click Here for Release of Records Request1. \"Have you been to the ER, urgent care clinic since your last visit?  Hospitalized since your last visit?\" No    2. \"Have you seen or consulted any other health care providers outside of the [facility] since your last visit?\" No     3. For patients aged 45-75: Has the patient had a colonoscopy / FIT/ Cologuard? No     If the patient is female:    4. For patients aged 40-74: Has the patient had a mammogram within the past 2 years? No    5. For patients aged 21-65: Has the patient had a pap smear? No    Chief Complaint   Patient presents with    Fall     Contusions on knees, on 4/29/24    Foot Swelling     Bilateral, x 1 week       There were no vitals filed for this visit.

## 2024-05-21 ENCOUNTER — OFFICE VISIT (OUTPATIENT)
Dept: FAMILY MEDICINE CLINIC | Age: 79
End: 2024-05-21
Payer: MEDICARE

## 2024-05-21 VITALS
OXYGEN SATURATION: 98 % | RESPIRATION RATE: 12 BRPM | DIASTOLIC BLOOD PRESSURE: 61 MMHG | SYSTOLIC BLOOD PRESSURE: 136 MMHG | HEIGHT: 63 IN | WEIGHT: 149 LBS | HEART RATE: 71 BPM | BODY MASS INDEX: 26.4 KG/M2 | TEMPERATURE: 97.8 F

## 2024-05-21 DIAGNOSIS — Z91.81 AT HIGH RISK FOR FALLS: ICD-10-CM

## 2024-05-21 DIAGNOSIS — R60.0 LOCALIZED EDEMA: Primary | ICD-10-CM

## 2024-05-21 DIAGNOSIS — M70.42 PREPATELLAR BURSITIS OF LEFT KNEE: ICD-10-CM

## 2024-05-21 DIAGNOSIS — I10 PRIMARY HYPERTENSION: ICD-10-CM

## 2024-05-21 PROCEDURE — 1090F PRES/ABSN URINE INCON ASSESS: CPT | Performed by: FAMILY MEDICINE

## 2024-05-21 PROCEDURE — 3078F DIAST BP <80 MM HG: CPT | Performed by: FAMILY MEDICINE

## 2024-05-21 PROCEDURE — G8399 PT W/DXA RESULTS DOCUMENT: HCPCS | Performed by: FAMILY MEDICINE

## 2024-05-21 PROCEDURE — G8428 CUR MEDS NOT DOCUMENT: HCPCS | Performed by: FAMILY MEDICINE

## 2024-05-21 PROCEDURE — 1036F TOBACCO NON-USER: CPT | Performed by: FAMILY MEDICINE

## 2024-05-21 PROCEDURE — 3075F SYST BP GE 130 - 139MM HG: CPT | Performed by: FAMILY MEDICINE

## 2024-05-21 PROCEDURE — 99213 OFFICE O/P EST LOW 20 MIN: CPT | Performed by: FAMILY MEDICINE

## 2024-05-21 PROCEDURE — G8419 CALC BMI OUT NRM PARAM NOF/U: HCPCS | Performed by: FAMILY MEDICINE

## 2024-05-21 PROCEDURE — 1123F ACP DISCUSS/DSCN MKR DOCD: CPT | Performed by: FAMILY MEDICINE

## 2024-05-21 RX ORDER — HYDROCHLOROTHIAZIDE 12.5 MG/1
12.5 CAPSULE, GELATIN COATED ORAL EVERY MORNING
Qty: 90 CAPSULE | Refills: 0 | Status: SHIPPED | OUTPATIENT
Start: 2024-05-21

## 2024-05-21 RX ORDER — AMLODIPINE BESYLATE 5 MG/1
5 TABLET ORAL DAILY
Qty: 90 TABLET | Refills: 0 | Status: SHIPPED | OUTPATIENT
Start: 2024-05-21

## 2024-05-21 ASSESSMENT — PATIENT HEALTH QUESTIONNAIRE - PHQ9
SUM OF ALL RESPONSES TO PHQ QUESTIONS 1-9: 2
1. LITTLE INTEREST OR PLEASURE IN DOING THINGS: SEVERAL DAYS
SUM OF ALL RESPONSES TO PHQ QUESTIONS 1-9: 2
1. LITTLE INTEREST OR PLEASURE IN DOING THINGS: SEVERAL DAYS
SUM OF ALL RESPONSES TO PHQ QUESTIONS 1-9: 2
SUM OF ALL RESPONSES TO PHQ QUESTIONS 1-9: 1
2. FEELING DOWN, DEPRESSED OR HOPELESS: SEVERAL DAYS
SUM OF ALL RESPONSES TO PHQ9 QUESTIONS 1 & 2: 2
SUM OF ALL RESPONSES TO PHQ QUESTIONS 1-9: 1
SUM OF ALL RESPONSES TO PHQ QUESTIONS 1-9: 2
SUM OF ALL RESPONSES TO PHQ QUESTIONS 1-9: 1
SUM OF ALL RESPONSES TO PHQ QUESTIONS 1-9: 1

## 2024-05-21 NOTE — PROGRESS NOTES
\"Have you been to the ER, urgent care clinic since your last visit?  Hospitalized since your last visit?\"    NO    “Have you seen or consulted any other health care providers outside of [facility] since your last visit?”    NO            Click Here for Release of Records Request  
present.      Comments: Left knee with swelling ant over patella and 2 scabs, healing, no sign of infection, normal ROM   Lymphadenopathy:      Cervical: No cervical adenopathy.   Skin:     General: Skin is warm and dry.      Findings: Bruising (left knee) present.   Neurological:      Mental Status: She is alert and oriented to person, place, and time.   Psychiatric:         Mood and Affect: Mood normal.         Behavior: Behavior normal.          Assessment/Plan:   Diagnosis Orders   1. Localized edema  hydroCHLOROthiazide 12.5 MG capsule      2. Primary hypertension  amLODIPine (NORVASC) 5 MG tablet    hydroCHLOROthiazide 12.5 MG capsule      3. Prepatellar bursitis of left knee  May take an Aleve a day and apply heat  May resolve      4. At high risk for falls        Cont current meds  Recheck in 3 mo    No follow-up provider specified.    Nina Galloway MD

## 2024-06-03 ENCOUNTER — OFFICE VISIT (OUTPATIENT)
Age: 79
End: 2024-06-03
Payer: MEDICARE

## 2024-06-03 VITALS
WEIGHT: 147 LBS | HEART RATE: 69 BPM | OXYGEN SATURATION: 98 % | SYSTOLIC BLOOD PRESSURE: 150 MMHG | BODY MASS INDEX: 26.05 KG/M2 | HEIGHT: 63 IN | DIASTOLIC BLOOD PRESSURE: 80 MMHG | TEMPERATURE: 97.2 F | RESPIRATION RATE: 18 BRPM

## 2024-06-03 DIAGNOSIS — I10 ESSENTIAL HYPERTENSION: ICD-10-CM

## 2024-06-03 DIAGNOSIS — I44.7 LBBB (LEFT BUNDLE BRANCH BLOCK): Primary | ICD-10-CM

## 2024-06-03 PROCEDURE — 93000 ELECTROCARDIOGRAM COMPLETE: CPT | Performed by: INTERNAL MEDICINE

## 2024-06-03 PROCEDURE — G8399 PT W/DXA RESULTS DOCUMENT: HCPCS | Performed by: INTERNAL MEDICINE

## 2024-06-03 PROCEDURE — G8419 CALC BMI OUT NRM PARAM NOF/U: HCPCS | Performed by: INTERNAL MEDICINE

## 2024-06-03 PROCEDURE — 3079F DIAST BP 80-89 MM HG: CPT | Performed by: INTERNAL MEDICINE

## 2024-06-03 PROCEDURE — 1123F ACP DISCUSS/DSCN MKR DOCD: CPT | Performed by: INTERNAL MEDICINE

## 2024-06-03 PROCEDURE — 1036F TOBACCO NON-USER: CPT | Performed by: INTERNAL MEDICINE

## 2024-06-03 PROCEDURE — G8428 CUR MEDS NOT DOCUMENT: HCPCS | Performed by: INTERNAL MEDICINE

## 2024-06-03 PROCEDURE — 3074F SYST BP LT 130 MM HG: CPT | Performed by: INTERNAL MEDICINE

## 2024-06-03 PROCEDURE — 99213 OFFICE O/P EST LOW 20 MIN: CPT | Performed by: INTERNAL MEDICINE

## 2024-06-03 PROCEDURE — 1090F PRES/ABSN URINE INCON ASSESS: CPT | Performed by: INTERNAL MEDICINE

## 2024-06-03 ASSESSMENT — PATIENT HEALTH QUESTIONNAIRE - PHQ9
SUM OF ALL RESPONSES TO PHQ QUESTIONS 1-9: 2
SUM OF ALL RESPONSES TO PHQ9 QUESTIONS 1 & 2: 2
SUM OF ALL RESPONSES TO PHQ QUESTIONS 1-9: 2
1. LITTLE INTEREST OR PLEASURE IN DOING THINGS: SEVERAL DAYS
SUM OF ALL RESPONSES TO PHQ QUESTIONS 1-9: 2
2. FEELING DOWN, DEPRESSED OR HOPELESS: SEVERAL DAYS
SUM OF ALL RESPONSES TO PHQ QUESTIONS 1-9: 2

## 2024-06-03 NOTE — PROGRESS NOTES
Identified pt with two pt identifiers(name and ). Reviewed record in preparation for visit and have obtained necessary documentation.  Chief Complaint   Patient presents with    Other     LBBB    Hypertension    Cholesterol Problem      BP (!) 150/80 (Site: Right Upper Arm, Position: Sitting, Cuff Size: Medium Adult)   Pulse 69   Temp 97.2 °F (36.2 °C) (Temporal)   Resp 18   Ht 1.6 m (5' 3\")   Wt 66.7 kg (147 lb)   LMP  (LMP Unknown)   SpO2 98%   BMI 26.04 kg/m²       Medications reviewed/approved by provider.      Health Maintenance Review: Patient reminded of \"due or due soon\" health maintenance. I have asked the patient to contact his/her primary care provider (PCP) for follow-up on his/her health maintenance.    Coordination of Care Questionnaire:  :   1) Have you been to an emergency room, urgent care, or hospitalized since your last visit?  If yes, where when, and reason for visit? no       2. Have seen or consulted any other health care provider since your last visit?   If yes, where when, and reason for visit?  no      Patient is accompanied by self I have received verbal consent from Nydia Aburto to discuss any/all medical information while they are present in the room.

## 2024-08-14 DIAGNOSIS — I10 ESSENTIAL HYPERTENSION WITH GOAL BLOOD PRESSURE LESS THAN 140/90: ICD-10-CM

## 2024-08-14 DIAGNOSIS — E78.2 MIXED HYPERLIPIDEMIA: ICD-10-CM

## 2024-08-15 RX ORDER — ROSUVASTATIN CALCIUM 40 MG/1
40 TABLET, COATED ORAL DAILY
Qty: 90 TABLET | Refills: 0 | Status: SHIPPED | OUTPATIENT
Start: 2024-08-15

## 2024-08-15 RX ORDER — LOSARTAN POTASSIUM 100 MG/1
100 TABLET ORAL DAILY
Qty: 90 TABLET | Refills: 0 | Status: SHIPPED | OUTPATIENT
Start: 2024-08-15

## 2024-08-15 NOTE — TELEPHONE ENCOUNTER
Requested Prescriptions     Pending Prescriptions Disp Refills    rosuvastatin (CRESTOR) 40 MG tablet [Pharmacy Med Name: rosuvastatin 40 mg tablet] 90 tablet 1     Sig: TAKE 1 TABLET BY MOUTH EVERY DAY    losartan (COZAAR) 100 MG tablet [Pharmacy Med Name: losartan 100 mg tablet] 90 tablet 1     Sig: TAKE 1 TABLET BY MOUTH EVERY DAY     Last seen:  5/21/24

## 2024-08-20 ENCOUNTER — OFFICE VISIT (OUTPATIENT)
Dept: FAMILY MEDICINE CLINIC | Age: 79
End: 2024-08-20
Payer: MEDICARE

## 2024-08-20 VITALS
DIASTOLIC BLOOD PRESSURE: 78 MMHG | SYSTOLIC BLOOD PRESSURE: 138 MMHG | TEMPERATURE: 97.7 F | BODY MASS INDEX: 25.16 KG/M2 | HEIGHT: 63 IN | HEART RATE: 71 BPM | OXYGEN SATURATION: 96 % | RESPIRATION RATE: 12 BRPM | WEIGHT: 142 LBS

## 2024-08-20 DIAGNOSIS — F33.0 MAJOR DEPRESSIVE DISORDER, RECURRENT, MILD (HCC): ICD-10-CM

## 2024-08-20 DIAGNOSIS — I10 PRIMARY HYPERTENSION: Primary | ICD-10-CM

## 2024-08-20 DIAGNOSIS — E83.52 HYPERCALCEMIA: ICD-10-CM

## 2024-08-20 DIAGNOSIS — E78.2 MIXED HYPERLIPIDEMIA: ICD-10-CM

## 2024-08-20 PROCEDURE — 1090F PRES/ABSN URINE INCON ASSESS: CPT | Performed by: FAMILY MEDICINE

## 2024-08-20 PROCEDURE — G8419 CALC BMI OUT NRM PARAM NOF/U: HCPCS | Performed by: FAMILY MEDICINE

## 2024-08-20 PROCEDURE — 36415 COLL VENOUS BLD VENIPUNCTURE: CPT | Performed by: FAMILY MEDICINE

## 2024-08-20 PROCEDURE — G8399 PT W/DXA RESULTS DOCUMENT: HCPCS | Performed by: FAMILY MEDICINE

## 2024-08-20 PROCEDURE — 1036F TOBACCO NON-USER: CPT | Performed by: FAMILY MEDICINE

## 2024-08-20 PROCEDURE — G8427 DOCREV CUR MEDS BY ELIG CLIN: HCPCS | Performed by: FAMILY MEDICINE

## 2024-08-20 PROCEDURE — 3077F SYST BP >= 140 MM HG: CPT | Performed by: FAMILY MEDICINE

## 2024-08-20 PROCEDURE — 3078F DIAST BP <80 MM HG: CPT | Performed by: FAMILY MEDICINE

## 2024-08-20 PROCEDURE — 99214 OFFICE O/P EST MOD 30 MIN: CPT | Performed by: FAMILY MEDICINE

## 2024-08-20 PROCEDURE — 1123F ACP DISCUSS/DSCN MKR DOCD: CPT | Performed by: FAMILY MEDICINE

## 2024-08-20 RX ORDER — HYDROCHLOROTHIAZIDE 12.5 MG/1
12.5 CAPSULE, GELATIN COATED ORAL EVERY MORNING
Qty: 90 CAPSULE | Refills: 1 | Status: SHIPPED | OUTPATIENT
Start: 2024-08-20

## 2024-08-20 RX ORDER — FLUOXETINE HYDROCHLORIDE 20 MG/1
20 CAPSULE ORAL DAILY
Qty: 90 CAPSULE | Refills: 1 | Status: SHIPPED | OUTPATIENT
Start: 2024-08-20

## 2024-08-20 RX ORDER — LOSARTAN POTASSIUM 100 MG/1
100 TABLET ORAL DAILY
Qty: 90 TABLET | Refills: 1 | Status: SHIPPED | OUTPATIENT
Start: 2024-08-20

## 2024-08-20 RX ORDER — AMLODIPINE BESYLATE 5 MG/1
5 TABLET ORAL DAILY
Qty: 90 TABLET | Refills: 1 | Status: SHIPPED | OUTPATIENT
Start: 2024-08-20

## 2024-08-20 RX ORDER — ROSUVASTATIN CALCIUM 40 MG/1
40 TABLET, COATED ORAL DAILY
Qty: 90 TABLET | Refills: 0 | Status: SHIPPED | OUTPATIENT
Start: 2024-08-20

## 2024-08-20 RX ORDER — FENOFIBRATE 145 MG/1
145 TABLET, COATED ORAL DAILY
Qty: 90 TABLET | Refills: 1 | Status: SHIPPED | OUTPATIENT
Start: 2024-08-20

## 2024-08-20 ASSESSMENT — PATIENT HEALTH QUESTIONNAIRE - PHQ9
SUM OF ALL RESPONSES TO PHQ9 QUESTIONS 1 & 2: 0
2. FEELING DOWN, DEPRESSED OR HOPELESS: NOT AT ALL
1. LITTLE INTEREST OR PLEASURE IN DOING THINGS: NOT AT ALL
SUM OF ALL RESPONSES TO PHQ QUESTIONS 1-9: 0

## 2024-08-20 ASSESSMENT — ENCOUNTER SYMPTOMS
COUGH: 0
ABDOMINAL PAIN: 0

## 2024-08-20 NOTE — PROGRESS NOTES
Successful venipuncture in patient's rac X 1 sticks.  The patient tolerated this procedure w/o c/o pain or discomfort.  
\"Have you been to the ER, urgent care clinic since your last visit?  Hospitalized since your last visit?\"    NO    “Have you seen or consulted any other health care providers outside of [facility] since your last visit?”    NO            Click Here for Release of Records Request  
Normocephalic and atraumatic.      Right Ear: Tympanic membrane, ear canal and external ear normal.      Left Ear: There is impacted cerumen.      Mouth/Throat:      Mouth: Mucous membranes are moist.      Pharynx: Oropharynx is clear.   Eyes:      Extraocular Movements: Extraocular movements intact.      Conjunctiva/sclera: Conjunctivae normal.      Pupils: Pupils are equal, round, and reactive to light.   Cardiovascular:      Rate and Rhythm: Normal rate and regular rhythm.      Heart sounds: Normal heart sounds.   Pulmonary:      Effort: Pulmonary effort is normal.      Breath sounds: Normal breath sounds.   Abdominal:      Tenderness: There is no right CVA tenderness or left CVA tenderness.   Musculoskeletal:      Right lower leg: No edema.      Left lower leg: No edema.   Lymphadenopathy:      Cervical: No cervical adenopathy.   Skin:     General: Skin is warm and dry.   Neurological:      Mental Status: She is alert and oriented to person, place, and time.   Psychiatric:         Mood and Affect: Mood normal.         Behavior: Behavior normal.          Assessment/Plan:   Diagnosis Orders   1. Primary hypertension  Basic Metabolic Panel    hydroCHLOROthiazide 12.5 MG capsule    losartan (COZAAR) 100 MG tablet    amLODIPine (NORVASC) 5 MG tablet    Basic Metabolic Panel      2. Hypercalcemia  Calcium Ionized Serum    PTH, Intact    Calcium Ionized Serum    PTH, Intact      3. Mixed hyperlipidemia  fenofibrate (TRICOR) 145 MG tablet    rosuvastatin (CRESTOR) 40 MG tablet      4. Major depressive disorder, recurrent, mild (HCC)  FLUoxetine (PROZAC) 20 MG capsule          No follow-up provider specified.    Nina Galloway MD

## 2024-08-21 LAB
ANION GAP SERPL CALC-SCNC: 7 MMOL/L (ref 5–15)
BUN SERPL-MCNC: 23 MG/DL (ref 6–20)
BUN/CREAT SERPL: 20 (ref 12–20)
CALCIUM SERPL-MCNC: 10.2 MG/DL (ref 8.5–10.1)
CALCIUM SERPL-MCNC: 10.6 MG/DL (ref 8.5–10.1)
CHLORIDE SERPL-SCNC: 110 MMOL/L (ref 97–108)
CO2 SERPL-SCNC: 25 MMOL/L (ref 21–32)
CREAT SERPL-MCNC: 1.13 MG/DL (ref 0.55–1.02)
GLUCOSE SERPL-MCNC: 90 MG/DL (ref 65–100)
POTASSIUM SERPL-SCNC: 4.2 MMOL/L (ref 3.5–5.1)
PTH-INTACT SERPL-MCNC: 20.5 PG/ML (ref 18.4–88)
SODIUM SERPL-SCNC: 142 MMOL/L (ref 136–145)

## 2024-08-22 LAB — CA-I SERPL ISE-MCNC: 5.6 MG/DL (ref 4.5–5.6)

## 2025-02-05 ENCOUNTER — TRANSCRIBE ORDERS (OUTPATIENT)
Facility: HOSPITAL | Age: 80
End: 2025-02-05

## 2025-02-05 DIAGNOSIS — Z12.31 SCREENING MAMMOGRAM FOR BREAST CANCER: Primary | ICD-10-CM

## 2025-02-14 DIAGNOSIS — E78.2 MIXED HYPERLIPIDEMIA: ICD-10-CM

## 2025-02-14 DIAGNOSIS — F33.0 MAJOR DEPRESSIVE DISORDER, RECURRENT, MILD (HCC): ICD-10-CM

## 2025-02-14 RX ORDER — ROSUVASTATIN CALCIUM 40 MG/1
40 TABLET, COATED ORAL DAILY
Qty: 30 TABLET | Refills: 0 | Status: SHIPPED | OUTPATIENT
Start: 2025-02-14

## 2025-02-14 NOTE — TELEPHONE ENCOUNTER
Patient requesting refill on     Requested Prescriptions     Pending Prescriptions Disp Refills    rosuvastatin (CRESTOR) 40 MG tablet [Pharmacy Med Name: rosuvastatin 40 mg tablet] 90 tablet 0     Sig: TAKE 1 TABLET BY MOUTH EVERY DAY    FLUoxetine (PROZAC) 20 MG capsule [Pharmacy Med Name: fluoxetine 20 mg capsule] 90 capsule 0     Sig: TAKE 1 CAPSULE BY MOUTH EVERY DAY        Last OV 8/20/2024

## 2025-02-25 ENCOUNTER — HOSPITAL ENCOUNTER (OUTPATIENT)
Facility: HOSPITAL | Age: 80
Discharge: HOME | End: 2025-02-28
Attending: FAMILY MEDICINE
Payer: MEDICARE

## 2025-02-25 DIAGNOSIS — Z12.31 SCREENING MAMMOGRAM FOR BREAST CANCER: ICD-10-CM

## 2025-02-25 PROCEDURE — 77063 BREAST TOMOSYNTHESIS BI: CPT

## 2025-03-03 ENCOUNTER — OFFICE VISIT (OUTPATIENT)
Dept: FAMILY MEDICINE CLINIC | Age: 80
End: 2025-03-03
Payer: MEDICARE

## 2025-03-03 VITALS
BODY MASS INDEX: 24.8 KG/M2 | HEART RATE: 73 BPM | HEIGHT: 63 IN | WEIGHT: 140 LBS | DIASTOLIC BLOOD PRESSURE: 68 MMHG | TEMPERATURE: 97.8 F | SYSTOLIC BLOOD PRESSURE: 114 MMHG

## 2025-03-03 DIAGNOSIS — I10 PRIMARY HYPERTENSION: ICD-10-CM

## 2025-03-03 DIAGNOSIS — F33.0 MAJOR DEPRESSIVE DISORDER, RECURRENT, MILD: ICD-10-CM

## 2025-03-03 DIAGNOSIS — Z00.00 MEDICARE ANNUAL WELLNESS VISIT, SUBSEQUENT: Primary | ICD-10-CM

## 2025-03-03 DIAGNOSIS — E78.2 MIXED HYPERLIPIDEMIA: ICD-10-CM

## 2025-03-03 DIAGNOSIS — L98.9 SKIN LESION OF FACE: ICD-10-CM

## 2025-03-03 PROCEDURE — G8420 CALC BMI NORM PARAMETERS: HCPCS | Performed by: FAMILY MEDICINE

## 2025-03-03 PROCEDURE — G8399 PT W/DXA RESULTS DOCUMENT: HCPCS | Performed by: FAMILY MEDICINE

## 2025-03-03 PROCEDURE — 1126F AMNT PAIN NOTED NONE PRSNT: CPT | Performed by: FAMILY MEDICINE

## 2025-03-03 PROCEDURE — 1159F MED LIST DOCD IN RCRD: CPT | Performed by: FAMILY MEDICINE

## 2025-03-03 PROCEDURE — 99214 OFFICE O/P EST MOD 30 MIN: CPT | Performed by: FAMILY MEDICINE

## 2025-03-03 PROCEDURE — 36415 COLL VENOUS BLD VENIPUNCTURE: CPT | Performed by: FAMILY MEDICINE

## 2025-03-03 PROCEDURE — 1123F ACP DISCUSS/DSCN MKR DOCD: CPT | Performed by: FAMILY MEDICINE

## 2025-03-03 PROCEDURE — 3078F DIAST BP <80 MM HG: CPT | Performed by: FAMILY MEDICINE

## 2025-03-03 PROCEDURE — 3074F SYST BP LT 130 MM HG: CPT | Performed by: FAMILY MEDICINE

## 2025-03-03 PROCEDURE — 1090F PRES/ABSN URINE INCON ASSESS: CPT | Performed by: FAMILY MEDICINE

## 2025-03-03 PROCEDURE — G0439 PPPS, SUBSEQ VISIT: HCPCS | Performed by: FAMILY MEDICINE

## 2025-03-03 PROCEDURE — 1036F TOBACCO NON-USER: CPT | Performed by: FAMILY MEDICINE

## 2025-03-03 PROCEDURE — G8427 DOCREV CUR MEDS BY ELIG CLIN: HCPCS | Performed by: FAMILY MEDICINE

## 2025-03-03 RX ORDER — FENOFIBRATE 145 MG/1
145 TABLET, COATED ORAL DAILY
Qty: 90 TABLET | Refills: 1 | Status: SHIPPED | OUTPATIENT
Start: 2025-03-03

## 2025-03-03 RX ORDER — AMLODIPINE BESYLATE 5 MG/1
5 TABLET ORAL DAILY
Qty: 90 TABLET | Refills: 1 | Status: SHIPPED | OUTPATIENT
Start: 2025-03-03

## 2025-03-03 RX ORDER — HYDROCHLOROTHIAZIDE 12.5 MG/1
12.5 CAPSULE ORAL EVERY MORNING
Qty: 90 CAPSULE | Refills: 1 | Status: SHIPPED | OUTPATIENT
Start: 2025-03-03

## 2025-03-03 RX ORDER — LOSARTAN POTASSIUM 100 MG/1
100 TABLET ORAL DAILY
Qty: 90 TABLET | Refills: 1 | Status: SHIPPED | OUTPATIENT
Start: 2025-03-03

## 2025-03-03 RX ORDER — ROSUVASTATIN CALCIUM 40 MG/1
40 TABLET, COATED ORAL DAILY
Qty: 90 TABLET | Refills: 1 | Status: SHIPPED | OUTPATIENT
Start: 2025-03-03

## 2025-03-03 SDOH — ECONOMIC STABILITY: FOOD INSECURITY: WITHIN THE PAST 12 MONTHS, THE FOOD YOU BOUGHT JUST DIDN'T LAST AND YOU DIDN'T HAVE MONEY TO GET MORE.: NEVER TRUE

## 2025-03-03 SDOH — ECONOMIC STABILITY: FOOD INSECURITY: WITHIN THE PAST 12 MONTHS, YOU WORRIED THAT YOUR FOOD WOULD RUN OUT BEFORE YOU GOT MONEY TO BUY MORE.: NEVER TRUE

## 2025-03-03 ASSESSMENT — PATIENT HEALTH QUESTIONNAIRE - PHQ9
3. TROUBLE FALLING OR STAYING ASLEEP: NOT AT ALL
SUM OF ALL RESPONSES TO PHQ QUESTIONS 1-9: 0
SUM OF ALL RESPONSES TO PHQ QUESTIONS 1-9: 0
10. IF YOU CHECKED OFF ANY PROBLEMS, HOW DIFFICULT HAVE THESE PROBLEMS MADE IT FOR YOU TO DO YOUR WORK, TAKE CARE OF THINGS AT HOME, OR GET ALONG WITH OTHER PEOPLE: NOT DIFFICULT AT ALL
2. FEELING DOWN, DEPRESSED OR HOPELESS: NOT AT ALL
5. POOR APPETITE OR OVEREATING: NOT AT ALL
4. FEELING TIRED OR HAVING LITTLE ENERGY: NOT AT ALL
SUM OF ALL RESPONSES TO PHQ QUESTIONS 1-9: 0
1. LITTLE INTEREST OR PLEASURE IN DOING THINGS: NOT AT ALL
7. TROUBLE CONCENTRATING ON THINGS, SUCH AS READING THE NEWSPAPER OR WATCHING TELEVISION: NOT AT ALL
SUM OF ALL RESPONSES TO PHQ QUESTIONS 1-9: 0
9. THOUGHTS THAT YOU WOULD BE BETTER OFF DEAD, OR OF HURTING YOURSELF: NOT AT ALL
8. MOVING OR SPEAKING SO SLOWLY THAT OTHER PEOPLE COULD HAVE NOTICED. OR THE OPPOSITE, BEING SO FIGETY OR RESTLESS THAT YOU HAVE BEEN MOVING AROUND A LOT MORE THAN USUAL: NOT AT ALL
6. FEELING BAD ABOUT YOURSELF - OR THAT YOU ARE A FAILURE OR HAVE LET YOURSELF OR YOUR FAMILY DOWN: NOT AT ALL

## 2025-03-03 ASSESSMENT — LIFESTYLE VARIABLES
HOW MANY STANDARD DRINKS CONTAINING ALCOHOL DO YOU HAVE ON A TYPICAL DAY: 1 OR 2
HOW OFTEN DO YOU HAVE A DRINK CONTAINING ALCOHOL: 2-4 TIMES A MONTH

## 2025-03-03 ASSESSMENT — ENCOUNTER SYMPTOMS
COUGH: 0
ABDOMINAL PAIN: 0
NAUSEA: 0
VOMITING: 0

## 2025-03-03 NOTE — PROGRESS NOTES
Nydia Aburto is a 79 y.o. female who presents to the office today with the following:  Chief Complaint   Patient presents with    Medicare AWV       HPI  HM reviewed    HTN  BP is good  No SE with refill    Hyperlipidemia  Fasting for BW    Depression  On Prozac  Helping and wants to cont    Skin lesion on forehead bothersome with combing and fooling with it  Would like to have it removed    Review of Systems   Constitutional:  Negative for unexpected weight change.   HENT:  Negative for congestion.    Respiratory:  Negative for cough.    Cardiovascular:  Negative for chest pain.   Gastrointestinal:  Negative for abdominal pain, nausea and vomiting.   Genitourinary:  Positive for frequency. Negative for dysuria.   Neurological:  Negative for dizziness and headaches.       See HPI.    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    Depression     Endocrine disorder     not current      Hyperlipidemia     Hypertension     Menopause     Metabolic syndrome     Vitamin D deficiency        Past Surgical History:   Procedure Laterality Date    BREAST BIOPSY Left     benign    BREAST SURGERY  8/2010    Breast BX; negative    CATARACT EXTRACTION N/A     COLONOSCOPY      nl, done in [place]    COLONOSCOPY  2018    polyp    COLONOSCOPY N/A 4/23/2018    COLONOSCOPY POSSIBLE DILATION performed by Ev Michaels MD at [facility] MAIN OR    GYN  1996    PAUL & BSO     HYSTERECTOMY (CERVIX STATUS UNKNOWN)      age 53    OVARY REMOVAL      UPPER GASTROINTESTINAL ENDOSCOPY  2018       Allergies   Allergen Reactions    Ace Inhibitors Cough    Atorvastatin Myalgia       Current Outpatient Medications   Medication Sig Dispense Refill    amLODIPine (NORVASC) 5 MG tablet Take 1 tablet by mouth daily 90 tablet 1    fenofibrate (TRICOR) 145 MG tablet Take 1 tablet by mouth daily 90 tablet 1    FLUoxetine (PROZAC) 20 MG capsule Take 1 capsule by mouth daily 90 capsule 1    hydroCHLOROthiazide 12.5 MG capsule Take 1

## 2025-03-03 NOTE — PATIENT INSTRUCTIONS
instruction, always ask your healthcare professional. [employer], [facility], disclaims any warranty or liability for your use of this information.    Personalized Preventive Plan for Nydia Aburto - 3/3/2025  Medicare offers a range of preventive health benefits. Some of the tests and screenings are paid in full while other may be subject to a deductible, co-insurance, and/or copay.  Some of these benefits include a comprehensive review of your medical history including lifestyle, illnesses that may run in your family, and various assessments and screenings as appropriate.  After reviewing your medical record and screening and assessments performed today your provider may have ordered immunizations, labs, imaging, and/or referrals for you.  A list of these orders (if applicable) as well as your Preventive Care list are included within your After Visit Summary for your review.

## 2025-03-04 LAB
ALBUMIN SERPL-MCNC: 3.3 G/DL (ref 3.5–5)
ALBUMIN/GLOB SERPL: 1.1 (ref 1.1–2.2)
ALP SERPL-CCNC: 38 U/L (ref 45–117)
ALT SERPL-CCNC: 20 U/L (ref 12–78)
ANION GAP SERPL CALC-SCNC: 5 MMOL/L (ref 2–12)
AST SERPL-CCNC: 25 U/L (ref 15–37)
BASOPHILS # BLD: 0.05 K/UL (ref 0–0.1)
BASOPHILS NFR BLD: 0.9 % (ref 0–1)
BILIRUB SERPL-MCNC: 0.5 MG/DL (ref 0.2–1)
BUN SERPL-MCNC: 18 MG/DL (ref 6–20)
BUN/CREAT SERPL: 16 (ref 12–20)
CALCIUM SERPL-MCNC: 9.9 MG/DL (ref 8.5–10.1)
CHLORIDE SERPL-SCNC: 110 MMOL/L (ref 97–108)
CHOLEST SERPL-MCNC: 150 MG/DL
CO2 SERPL-SCNC: 24 MMOL/L (ref 21–32)
CREAT SERPL-MCNC: 1.11 MG/DL (ref 0.55–1.02)
DIFFERENTIAL METHOD BLD: ABNORMAL
EOSINOPHIL # BLD: 0.15 K/UL (ref 0–0.4)
EOSINOPHIL NFR BLD: 2.6 % (ref 0–7)
ERYTHROCYTE [DISTWIDTH] IN BLOOD BY AUTOMATED COUNT: 14.4 % (ref 11.5–14.5)
GLOBULIN SER CALC-MCNC: 3.1 G/DL (ref 2–4)
GLUCOSE SERPL-MCNC: 87 MG/DL (ref 65–100)
HCT VFR BLD AUTO: 36.5 % (ref 35–47)
HDLC SERPL-MCNC: 83 MG/DL
HDLC SERPL: 1.8 (ref 0–5)
HGB BLD-MCNC: 11.3 G/DL (ref 11.5–16)
IMM GRANULOCYTES # BLD AUTO: 0.04 K/UL (ref 0–0.04)
IMM GRANULOCYTES NFR BLD AUTO: 0.7 % (ref 0–0.5)
LDLC SERPL CALC-MCNC: 55.8 MG/DL (ref 0–100)
LYMPHOCYTES # BLD: 1.69 K/UL (ref 0.8–3.5)
LYMPHOCYTES NFR BLD: 29.8 % (ref 12–49)
MCH RBC QN AUTO: 29 PG (ref 26–34)
MCHC RBC AUTO-ENTMCNC: 31 G/DL (ref 30–36.5)
MCV RBC AUTO: 93.8 FL (ref 80–99)
MONOCYTES # BLD: 0.39 K/UL (ref 0–1)
MONOCYTES NFR BLD: 6.9 % (ref 5–13)
NEUTS SEG # BLD: 3.35 K/UL (ref 1.8–8)
NEUTS SEG NFR BLD: 59.1 % (ref 32–75)
NRBC # BLD: 0 K/UL (ref 0–0.01)
NRBC BLD-RTO: 0 PER 100 WBC
PLATELET # BLD AUTO: 263 K/UL (ref 150–400)
PMV BLD AUTO: 11.8 FL (ref 8.9–12.9)
POTASSIUM SERPL-SCNC: 4 MMOL/L (ref 3.5–5.1)
PROT SERPL-MCNC: 6.4 G/DL (ref 6.4–8.2)
RBC # BLD AUTO: 3.89 M/UL (ref 3.8–5.2)
SODIUM SERPL-SCNC: 139 MMOL/L (ref 136–145)
TRIGL SERPL-MCNC: 56 MG/DL
VLDLC SERPL CALC-MCNC: 11.2 MG/DL
WBC # BLD AUTO: 5.7 K/UL (ref 3.6–11)

## 2025-03-20 ENCOUNTER — HOSPITAL ENCOUNTER (OUTPATIENT)
Facility: HOSPITAL | Age: 80
Setting detail: SPECIMEN
Discharge: HOME | End: 2025-03-23

## 2025-03-20 ENCOUNTER — PROCEDURE VISIT (OUTPATIENT)
Dept: FAMILY MEDICINE CLINIC | Age: 80
End: 2025-03-20

## 2025-03-20 VITALS
SYSTOLIC BLOOD PRESSURE: 99 MMHG | OXYGEN SATURATION: 97 % | BODY MASS INDEX: 24.45 KG/M2 | HEART RATE: 86 BPM | WEIGHT: 138 LBS | TEMPERATURE: 97.7 F | DIASTOLIC BLOOD PRESSURE: 65 MMHG | RESPIRATION RATE: 12 BRPM | HEIGHT: 63 IN

## 2025-03-20 DIAGNOSIS — L98.9 SKIN LESION OF FACE: Primary | ICD-10-CM

## 2025-03-20 ASSESSMENT — PATIENT HEALTH QUESTIONNAIRE - PHQ9
2. FEELING DOWN, DEPRESSED OR HOPELESS: NOT AT ALL
SUM OF ALL RESPONSES TO PHQ QUESTIONS 1-9: 0
1. LITTLE INTEREST OR PLEASURE IN DOING THINGS: NOT AT ALL

## 2025-03-20 NOTE — PROGRESS NOTES
[facility]  OFFICE PROCEDURE TIMEOUT NOTE        Chart reviewed for the following:   Ellyn GOYAL LPN, have reviewed the History, Physical and updated the Allergic reactions for Nydia Aburto     TIME OUT performed immediately prior to start of procedure:   Ellyn GOAYL LPN, have performed the following reviews on Nydia Aburto prior to the start of the procedure:            * Patient was identified by name and date of birth   * Agreement on procedure being performed was verified  * Risks and Benefits explained to the patient by Nina Galloway MD  * Procedure site verified and marked as necessary  * Patient was positioned for comfort  * Consent was signed and verified     Time: 0930      Date of procedure: 3/20/2025    Procedure performed by:  Nina Galloway MD    Provider assisted by: ashley LEE    Patient assisted by: self    Pre Procedural Pain Scale: 0    Procedure start time:  [id number] time:  0935    How tolerated by patient: tolerated the procedure well with no complications    Post Procedural Pain Scale: 0 - No Hurt    Comments: none    Post op instructions and patient education reviewed.  Patient states understanding.    Copy of discharge instructions given to patient in AVS.        \"Have you been to the ER, urgent care clinic since your last visit?  Hospitalized since your last visit?\"    NO    “Have you seen or consulted any other health care providers outside our system since your last visit?”    NO           
Nylon  Abx ointment and bandaid placed  Pt tolerated it well  Minimal blood loss    Assessment/Plan:   Diagnosis Orders   1. Skin lesion of face  Surgical Pathology      Keep dry for 24 h  Monitor for sign of infection  Tylenol prn for pain  F/U in 1 w for suture removal      No follow-up provider specified.    Nina Galloway MD

## 2025-03-28 ENCOUNTER — OFFICE VISIT (OUTPATIENT)
Dept: FAMILY MEDICINE CLINIC | Age: 80
End: 2025-03-28
Payer: MEDICARE

## 2025-03-28 ENCOUNTER — RESULTS FOLLOW-UP (OUTPATIENT)
Dept: FAMILY MEDICINE CLINIC | Age: 80
End: 2025-03-28

## 2025-03-28 VITALS
HEIGHT: 63 IN | WEIGHT: 138 LBS | BODY MASS INDEX: 24.45 KG/M2 | HEART RATE: 80 BPM | DIASTOLIC BLOOD PRESSURE: 57 MMHG | SYSTOLIC BLOOD PRESSURE: 88 MMHG | OXYGEN SATURATION: 97 % | TEMPERATURE: 97.9 F | RESPIRATION RATE: 12 BRPM

## 2025-03-28 DIAGNOSIS — Z48.02 VISIT FOR SUTURE REMOVAL: ICD-10-CM

## 2025-03-28 DIAGNOSIS — M25.511 ACUTE PAIN OF RIGHT SHOULDER: Primary | ICD-10-CM

## 2025-03-28 PROCEDURE — 1036F TOBACCO NON-USER: CPT | Performed by: FAMILY MEDICINE

## 2025-03-28 PROCEDURE — 3078F DIAST BP <80 MM HG: CPT | Performed by: FAMILY MEDICINE

## 2025-03-28 PROCEDURE — G8427 DOCREV CUR MEDS BY ELIG CLIN: HCPCS | Performed by: FAMILY MEDICINE

## 2025-03-28 PROCEDURE — 1159F MED LIST DOCD IN RCRD: CPT | Performed by: FAMILY MEDICINE

## 2025-03-28 PROCEDURE — G8420 CALC BMI NORM PARAMETERS: HCPCS | Performed by: FAMILY MEDICINE

## 2025-03-28 PROCEDURE — G8399 PT W/DXA RESULTS DOCUMENT: HCPCS | Performed by: FAMILY MEDICINE

## 2025-03-28 PROCEDURE — 1090F PRES/ABSN URINE INCON ASSESS: CPT | Performed by: FAMILY MEDICINE

## 2025-03-28 PROCEDURE — 99213 OFFICE O/P EST LOW 20 MIN: CPT | Performed by: FAMILY MEDICINE

## 2025-03-28 PROCEDURE — 1125F AMNT PAIN NOTED PAIN PRSNT: CPT | Performed by: FAMILY MEDICINE

## 2025-03-28 PROCEDURE — 3074F SYST BP LT 130 MM HG: CPT | Performed by: FAMILY MEDICINE

## 2025-03-28 PROCEDURE — 1123F ACP DISCUSS/DSCN MKR DOCD: CPT | Performed by: FAMILY MEDICINE

## 2025-03-28 ASSESSMENT — PATIENT HEALTH QUESTIONNAIRE - PHQ9
SUM OF ALL RESPONSES TO PHQ QUESTIONS 1-9: 0
1. LITTLE INTEREST OR PLEASURE IN DOING THINGS: NOT AT ALL
2. FEELING DOWN, DEPRESSED OR HOPELESS: NOT AT ALL

## 2025-03-31 ENCOUNTER — HOSPITAL ENCOUNTER (OUTPATIENT)
Facility: HOSPITAL | Age: 80
Discharge: HOME | End: 2025-04-03
Payer: MEDICARE

## 2025-03-31 DIAGNOSIS — M25.511 ACUTE PAIN OF RIGHT SHOULDER: ICD-10-CM

## 2025-03-31 PROCEDURE — 73030 X-RAY EXAM OF SHOULDER: CPT

## 2025-04-02 ENCOUNTER — RESULTS FOLLOW-UP (OUTPATIENT)
Dept: FAMILY MEDICINE CLINIC | Age: 80
End: 2025-04-02

## 2025-04-29 ENCOUNTER — OFFICE VISIT (OUTPATIENT)
Dept: FAMILY MEDICINE CLINIC | Age: 80
End: 2025-04-29
Payer: MEDICARE

## 2025-04-29 VITALS
OXYGEN SATURATION: 98 % | RESPIRATION RATE: 12 BRPM | DIASTOLIC BLOOD PRESSURE: 65 MMHG | SYSTOLIC BLOOD PRESSURE: 135 MMHG | BODY MASS INDEX: 25.21 KG/M2 | HEIGHT: 62 IN | WEIGHT: 137 LBS | HEART RATE: 70 BPM | TEMPERATURE: 97.8 F

## 2025-04-29 DIAGNOSIS — M70.62 TROCHANTERIC BURSITIS OF LEFT HIP: ICD-10-CM

## 2025-04-29 DIAGNOSIS — M25.562 ACUTE PAIN OF LEFT KNEE: Primary | ICD-10-CM

## 2025-04-29 PROCEDURE — 3075F SYST BP GE 130 - 139MM HG: CPT | Performed by: FAMILY MEDICINE

## 2025-04-29 PROCEDURE — G8399 PT W/DXA RESULTS DOCUMENT: HCPCS | Performed by: FAMILY MEDICINE

## 2025-04-29 PROCEDURE — 99213 OFFICE O/P EST LOW 20 MIN: CPT | Performed by: FAMILY MEDICINE

## 2025-04-29 PROCEDURE — 3078F DIAST BP <80 MM HG: CPT | Performed by: FAMILY MEDICINE

## 2025-04-29 PROCEDURE — 1125F AMNT PAIN NOTED PAIN PRSNT: CPT | Performed by: FAMILY MEDICINE

## 2025-04-29 PROCEDURE — G8419 CALC BMI OUT NRM PARAM NOF/U: HCPCS | Performed by: FAMILY MEDICINE

## 2025-04-29 PROCEDURE — 1036F TOBACCO NON-USER: CPT | Performed by: FAMILY MEDICINE

## 2025-04-29 PROCEDURE — 1123F ACP DISCUSS/DSCN MKR DOCD: CPT | Performed by: FAMILY MEDICINE

## 2025-04-29 PROCEDURE — G8427 DOCREV CUR MEDS BY ELIG CLIN: HCPCS | Performed by: FAMILY MEDICINE

## 2025-04-29 PROCEDURE — 1159F MED LIST DOCD IN RCRD: CPT | Performed by: FAMILY MEDICINE

## 2025-04-29 PROCEDURE — 1090F PRES/ABSN URINE INCON ASSESS: CPT | Performed by: FAMILY MEDICINE

## 2025-04-29 NOTE — PROGRESS NOTES
Nydia Aburto is a 79 y.o. female who presents to the office today with the following:  Chief Complaint   Patient presents with    Leg Pain     L leg and knee pain.  Right foot swells during the day       Leg Pain       Left knee pain   From left thigh to the knee  Feels like pins stabbing  No recent injury    Has some bruising    Tried meds for OA  Helping for a while    Started about a mo ago  Today 2/10  Normally 1/10  Can hardly walk  Worse at night    Has not had Xray    Tylenol not helping much    Review of Systems    See HPI.    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    Depression     Endocrine disorder     not current      Hyperlipidemia     Hypertension     Menopause     Metabolic syndrome     Vitamin D deficiency        Past Surgical History:   Procedure Laterality Date    BREAST BIOPSY Left     benign    BREAST SURGERY  8/2010    Breast BX; negative    CATARACT EXTRACTION N/A     COLONOSCOPY      nl, done in [place]    COLONOSCOPY  2018    polyp    COLONOSCOPY N/A 4/23/2018    COLONOSCOPY POSSIBLE DILATION performed by Ev Michaels MD at [facility] MAIN OR    GYN  1996    PAUL & BSO     HYSTERECTOMY (CERVIX STATUS UNKNOWN)      age 53    OVARY REMOVAL      UPPER GASTROINTESTINAL ENDOSCOPY  2018       Allergies   Allergen Reactions    Ace Inhibitors Cough    Atorvastatin Myalgia       Current Outpatient Medications   Medication Sig Dispense Refill    diclofenac sodium (VOLTAREN) 1 % GEL Apply 2 g topically 4 times daily 100 g 0    amLODIPine (NORVASC) 5 MG tablet Take 1 tablet by mouth daily 90 tablet 1    fenofibrate (TRICOR) 145 MG tablet Take 1 tablet by mouth daily 90 tablet 1    FLUoxetine (PROZAC) 20 MG capsule Take 1 capsule by mouth daily 90 capsule 1    hydroCHLOROthiazide 12.5 MG capsule Take 1 capsule by mouth every morning 90 capsule 1    losartan (COZAAR) 100 MG tablet Take 1 tablet by mouth daily 90 tablet 1    rosuvastatin (CRESTOR) 40 MG tablet Take 1 tablet

## 2025-04-30 ENCOUNTER — HOSPITAL ENCOUNTER (OUTPATIENT)
Facility: HOSPITAL | Age: 80
Discharge: HOME | End: 2025-05-03
Payer: MEDICARE

## 2025-04-30 DIAGNOSIS — M25.562 ACUTE PAIN OF LEFT KNEE: ICD-10-CM

## 2025-04-30 PROCEDURE — 73564 X-RAY EXAM KNEE 4 OR MORE: CPT

## 2025-05-01 ENCOUNTER — RESULTS FOLLOW-UP (OUTPATIENT)
Dept: FAMILY MEDICINE CLINIC | Age: 80
End: 2025-05-01

## 2025-05-21 NOTE — PROGRESS NOTES
ASSESSMENT and PLAN  1. LBBB (left bundle branch block)  Normal LV function on echoes 4/12/2021 and 7/21/2022.  No ischemia on Lexiscan Cardiolite studies 4/21/2021 and 7/21/2022.  No clinical signs or symptoms of AV block.  Schedule follow-up echo.    2.  Nonrheumatic mitral valve regurgitation  Mild-moderate MR on echo 7/21/2022.  Schedule follow-up echo.    3.  Primary hypertension  Asymmetrical upper extremity BP readings (R>L).  She denies left arm symptoms.  Continue current medications.  Monitor BP in right arm for hypertension management.      Follow-up in 1 year.  We will contact her in the interim with the results of her echo and any additional recommendations.  The patient has been instructed and agrees to call our office with any issues or other concerns related to their cardiac condition(s) and/or complaint(s).      CHIEF COMPLAINT  Routine follow up    HPI:    Nydia Aburto is a 80 y.o. female here for follow-up of left bundle branch block.  No cardiac issues have developed since the last visit on 6/3/2024.  She denies chest pain, limiting exertional dyspnea, orthopnea, PND, palpitations, syncope or near syncope.  Her appetite is decreased and she has lost some weight.  She states that Dr. Galloway is aware.    PERTINENT CARDIAC HISTORY: (Records reviewed and summarized below)  LBBB  ==> Echo 4/12/2021, EF 70%, 1+ MR, RV nl, PASP 32  ==> Claudine Cardiolite 4/21/2021, EF 74%, no ischemia  ==> Echo 7/21/2022, EF 55%, 1-2+ MR, RV nl  ==> Claudine Cardiolite 7/21/2022, EF 53%, no ischemia    Asymmetrical upper extremity BP readings (R>L)  Hypertension  Hyperlipidemia  Metabolic syndrome  ACE inhibitor intolerance due to cough    Past medical history, past surgical history, family history, social history, and medications were all reviewed with the patient today and updated as necessary.     Current Outpatient Medications   Medication Sig    diclofenac sodium (VOLTAREN) 1 % GEL Apply 2 g topically 4 times

## 2025-05-27 NOTE — PROGRESS NOTES
[facility]  [facility]  [address] [place] [id number]  Dept: [phone]  Dept Fax: [phone]     5/27/2025      Carla Mack   1949     Chief Complaint   Patient presents with    Follow-up       HPI    Pt comes in today for routine follow up. HTN/HLD/T2DM/asthma. Just returned from [place]. Feeling well.     Date of last Mammogram: 2/28/2023     Date of last Colonoscopy: 5/26/2017   No cologuard on file  No FIT/FOBT on file   No flexible sigmoidoscopy on file     Asthma - using pulmicort nebulized daily. Has noticed thrush. Has been using the albuterol daily. Has been hesitant to change her maintenance med historically.     PHQ-9 Total Score: 0 (5/27/2025  8:39 AM)       Prior to Visit Medications    Medication Sig Taking? Authorizing Provider   pravastatin (PRAVACHOL) 80 MG tablet Take 1 tablet by mouth nightly Yes Marjorie Bond DO   albuterol (PROVENTIL) (2.5 MG/3ML) 0.083% nebulizer solution USE 1 VIAL IN NEBULIZER EVERY 6 HOURS AS NEEDED FOR WHEEZING (ASTHMA) Yes Marjorie Bond DO   albuterol sulfate HFA (PROVENTIL;VENTOLIN;PROAIR) 108 (90 Base) MCG/ACT inhaler Inhale 2 puffs into the lungs every 6 hours as needed for Wheezing Yes Marjorie Bond DO   spironolactone (ALDACTONE) 25 MG tablet TAKE 1 TABLET BY MOUTH ONCE DAILY IN THE MORNING Yes Marjorie Bond DO   glipiZIDE (GLUCOTROL XL) 2.5 MG extended release tablet Take 1 tablet by mouth once daily Yes Marjorie Bond DO   metoprolol succinate (TOPROL XL) 50 MG extended release tablet Take 1 tablet by mouth daily Yes Marjorie Bond DO   amLODIPine (NORVASC) 2.5 MG tablet Take 1 tablet by mouth daily Yes Marjorie Bond DO   blood glucose test strips (ONETOUCH ULTRA) strip Use to check blood sugar 1-2 times a day as needed Yes Marjorie Bond DO   Lancets (ONETOUCH DELICA PLUS [id number]) MISC USE 1 LANCET TO 
\"Have you been to the ER, urgent care clinic since your last visit?  Hospitalized since your last visit?\"    NO    “Have you seen or consulted any other health care providers outside our system since your last visit?”    NO           
kg/m²   Physical Exam  Vitals and nursing note reviewed.   Constitutional:       Appearance: She is normal weight.   HENT:      Head: Normocephalic and atraumatic.   Eyes:      Extraocular Movements: Extraocular movements intact.      Conjunctiva/sclera: Conjunctivae normal.   Pulmonary:      Effort: Pulmonary effort is normal.   Musculoskeletal:         General: Tenderness present. No swelling or deformity.      Comments: Right shoulder no redness or swelling, mild tenderness over upper arm, decreased active ROM, no atrophy of muscles, pain with ROM and test for strength   Skin:     General: Skin is warm and dry.      Comments: Wound healed well, 3 sutures in place, no sign of infection   Neurological:      Mental Status: She is alert and oriented to person, place, and time.   Psychiatric:         Mood and Affect: Mood normal.         Behavior: Behavior normal.        Sutures removed and Abx ointment placed    Assessment/Plan:   Diagnosis Orders   1. Acute pain of right shoulder  XR SHOULDER RIGHT (MIN 2 VIEWS)    diclofenac sodium (VOLTAREN) 1 % GEL      2. Visit for suture removal          May cont Tylenol and ice  If Xrays ok and shoulder pain not better may RTC for steroid shot    No follow-up provider specified.    Nina Galloway MD

## 2025-06-03 ENCOUNTER — OFFICE VISIT (OUTPATIENT)
Age: 80
End: 2025-06-03
Payer: MEDICARE

## 2025-06-03 VITALS
SYSTOLIC BLOOD PRESSURE: 130 MMHG | HEART RATE: 82 BPM | WEIGHT: 133 LBS | TEMPERATURE: 97.4 F | OXYGEN SATURATION: 98 % | BODY MASS INDEX: 24.48 KG/M2 | HEIGHT: 62 IN | DIASTOLIC BLOOD PRESSURE: 65 MMHG

## 2025-06-03 DIAGNOSIS — I34.0 NONRHEUMATIC MITRAL VALVE REGURGITATION: ICD-10-CM

## 2025-06-03 DIAGNOSIS — I44.7 LBBB (LEFT BUNDLE BRANCH BLOCK): Primary | ICD-10-CM

## 2025-06-03 DIAGNOSIS — I10 PRIMARY HYPERTENSION: ICD-10-CM

## 2025-06-03 PROCEDURE — 1090F PRES/ABSN URINE INCON ASSESS: CPT | Performed by: INTERNAL MEDICINE

## 2025-06-03 PROCEDURE — G8399 PT W/DXA RESULTS DOCUMENT: HCPCS | Performed by: INTERNAL MEDICINE

## 2025-06-03 PROCEDURE — 3075F SYST BP GE 130 - 139MM HG: CPT | Performed by: INTERNAL MEDICINE

## 2025-06-03 PROCEDURE — 99214 OFFICE O/P EST MOD 30 MIN: CPT | Performed by: INTERNAL MEDICINE

## 2025-06-03 PROCEDURE — 1126F AMNT PAIN NOTED NONE PRSNT: CPT | Performed by: INTERNAL MEDICINE

## 2025-06-03 PROCEDURE — G8428 CUR MEDS NOT DOCUMENT: HCPCS | Performed by: INTERNAL MEDICINE

## 2025-06-03 PROCEDURE — G8420 CALC BMI NORM PARAMETERS: HCPCS | Performed by: INTERNAL MEDICINE

## 2025-06-03 PROCEDURE — 1036F TOBACCO NON-USER: CPT | Performed by: INTERNAL MEDICINE

## 2025-06-03 PROCEDURE — 1123F ACP DISCUSS/DSCN MKR DOCD: CPT | Performed by: INTERNAL MEDICINE

## 2025-06-03 PROCEDURE — 3078F DIAST BP <80 MM HG: CPT | Performed by: INTERNAL MEDICINE

## 2025-06-03 ASSESSMENT — PATIENT HEALTH QUESTIONNAIRE - PHQ9
SUM OF ALL RESPONSES TO PHQ QUESTIONS 1-9: 0
SUM OF ALL RESPONSES TO PHQ QUESTIONS 1-9: 0
1. LITTLE INTEREST OR PLEASURE IN DOING THINGS: NOT AT ALL
2. FEELING DOWN, DEPRESSED OR HOPELESS: NOT AT ALL
SUM OF ALL RESPONSES TO PHQ QUESTIONS 1-9: 0
SUM OF ALL RESPONSES TO PHQ QUESTIONS 1-9: 0

## 2025-06-03 NOTE — PROGRESS NOTES
Identified pt with two pt identifiers(name and ). Reviewed record in preparation for visit and have obtained necessary documentation.  Chief Complaint   Patient presents with    Other     LBBB    Hypertension    Cholesterol Problem      /66 (BP Site: Left Upper Arm, Patient Position: Sitting, BP Cuff Size: Medium Adult)   Pulse 82   Temp 97.4 °F (36.3 °C) (Temporal)   Ht 1.575 m (5' 2\")   Wt 60.3 kg (133 lb)   LMP  (LMP Unknown)   SpO2 98%   BMI 24.33 kg/m²       Medications reviewed/approved by provider.      Health Maintenance Review: Patient reminded of \"due or due soon\" health maintenance. I have asked the patient to contact his/her primary care provider (PCP) for follow-up on his/her health maintenance.    Coordination of Care Questionnaire:  :   1) Have you been to an emergency room, urgent care, or hospitalized since your last visit?  If yes, where when, and reason for visit? no       2. Have seen or consulted any other health care provider since your last visit?   If yes, where when, and reason for visit?  no      Patient is accompanied by self I have received verbal consent from Nydia Aburto to discuss any/all medical information while they are present in the room.

## 2025-06-03 NOTE — PATIENT INSTRUCTIONS
I have ordered an echocardiogram for reevaluation of your heart.  We will contact you with the results.

## 2025-06-12 ENCOUNTER — HOSPITAL ENCOUNTER (OUTPATIENT)
Facility: HOSPITAL | Age: 80
Discharge: HOME | End: 2025-06-15
Attending: INTERNAL MEDICINE
Payer: MEDICARE

## 2025-06-12 ENCOUNTER — RESULTS FOLLOW-UP (OUTPATIENT)
Age: 80
End: 2025-06-12

## 2025-06-12 DIAGNOSIS — I44.7 LBBB (LEFT BUNDLE BRANCH BLOCK): ICD-10-CM

## 2025-06-12 DIAGNOSIS — J90 LOCULATED PLEURAL EFFUSION: ICD-10-CM

## 2025-06-12 DIAGNOSIS — I99.8 ASYMMETRIC BLOOD PRESSURES: ICD-10-CM

## 2025-06-12 DIAGNOSIS — R93.1 ABNORMAL ECHOCARDIOGRAM: ICD-10-CM

## 2025-06-12 DIAGNOSIS — I42.9 CARDIOMYOPATHY, UNSPECIFIED TYPE (HCC): Primary | ICD-10-CM

## 2025-06-12 LAB
ECHO AO ASC DIAM: 2.8 CM
ECHO AO ROOT DIAM: 2.3 CM
ECHO AV PEAK GRADIENT: 9 MMHG
ECHO AV PEAK VELOCITY: 1.5 M/S
ECHO EST RA PRESSURE: 3 MMHG
ECHO LA DIAMETER: 3.7 CM
ECHO LA TO AORTIC ROOT RATIO: 1.61
ECHO LA VOL A-L A2C: 72 ML (ref 22–52)
ECHO LA VOL A-L A4C: 83 ML (ref 22–52)
ECHO LA VOL MOD A2C: 63 ML (ref 22–52)
ECHO LA VOL MOD A4C: 80 ML (ref 22–52)
ECHO LA VOLUME AREA LENGTH: 86 ML
ECHO LV E' LATERAL VELOCITY: 8.87 CM/S
ECHO LV E' SEPTAL VELOCITY: 4.11 CM/S
ECHO LV EDV A2C: 178 ML
ECHO LV EDV A4C: 221 ML
ECHO LV EDV BP: 203 ML (ref 56–104)
ECHO LV EF PHYSICIAN: 20 %
ECHO LV EJECTION FRACTION A2C: 23 %
ECHO LV EJECTION FRACTION A4C: 29 %
ECHO LV EJECTION FRACTION BIPLANE: 27 % (ref 55–100)
ECHO LV ESV A2C: 137 ML
ECHO LV ESV A4C: 157 ML
ECHO LV ESV BP: 149 ML (ref 19–49)
ECHO LV FRACTIONAL SHORTENING: 16 % (ref 28–44)
ECHO LV INTERNAL DIMENSION DIASTOLIC: 6.3 CM (ref 3.9–5.3)
ECHO LV INTERNAL DIMENSION SYSTOLIC: 5.3 CM
ECHO LV IVSD: 1.4 CM (ref 0.6–0.9)
ECHO LV MASS 2D: 419.5 G (ref 67–162)
ECHO LV POSTERIOR WALL DIASTOLIC: 1.4 CM (ref 0.6–0.9)
ECHO LV RELATIVE WALL THICKNESS RATIO: 0.44
ECHO LVOT AREA: 2.3 CM2
ECHO LVOT DIAM: 1.7 CM
ECHO MV A VELOCITY: 1.44 M/S
ECHO MV E DECELERATION TIME (DT): 128.4 MS
ECHO MV E VELOCITY: 0.99 M/S
ECHO MV E/A RATIO: 0.69
ECHO MV E/E' LATERAL: 11.16
ECHO MV E/E' RATIO (AVERAGED): 17.62
ECHO MV E/E' SEPTAL: 24.09
ECHO PULMONARY ARTERY SYSTOLIC PRESSURE (PASP): 29 MMHG
ECHO PV MAX VELOCITY: 0.8 M/S
ECHO PV PEAK GRADIENT: 2 MMHG
ECHO RA VOLUME: 39 ML
ECHO RA VOLUME: 42 ML
ECHO RIGHT VENTRICULAR SYSTOLIC PRESSURE (RVSP): 27 MMHG
ECHO RV BASAL DIMENSION: 3 CM
ECHO RV FREE WALL PEAK S': 13.5 CM/S
ECHO TV REGURGITANT MAX VELOCITY: 2.43 M/S
ECHO TV REGURGITANT PEAK GRADIENT: 24 MMHG

## 2025-06-12 PROCEDURE — 93306 TTE W/DOPPLER COMPLETE: CPT

## 2025-06-12 PROCEDURE — 93306 TTE W/DOPPLER COMPLETE: CPT | Performed by: INTERNAL MEDICINE

## 2025-06-17 PROBLEM — I42.0 DILATED CARDIOMYOPATHY (HCC): Status: ACTIVE | Noted: 2025-06-17

## 2025-06-17 NOTE — PROGRESS NOTES
ASSESSMENT and PLAN  1.  Dilated cardiomyopathy  Dilated LV (LVED 6.3 cm) and EF 20-25% on echo 6/12/2025, new from 7/2022.  Suspect the LV dysfunction may be bundle-branch mediated but I recommend cath to definitively exclude underlying ischemic heart disease.  Check BNP and thyroid panel.  Continue losartan and add metoprolol succinate 25 mg daily.    2. LBBB (left bundle branch block)  Chronic.    3.  Nonrheumatic mitral valve regurgitation  Mild MR on echo 6/12/2025.  Stable    4.  Primary hypertension  Asymmetrical upper extremity BP readings (R>L).  She denies left arm symptoms.  Continue current medications.  Monitor BP in right arm for hypertension management.     5. Asymmetric blood pressures  Asymmetrical upper extremity BP readings (R>L).  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  She denies left arm symptoms.  Monitor BP in right arm for hypertension management.    6. Right-sided aortic arch  Right-sided aortic arch noted on CTA chest 6/20/2025.    7. Pulmonary nodule  15 x 8 mm RLL nodule and small GUSTAVO groundglass opacity on chest CTA 6/20/2025.  Follow-up CT in 3 months recommended.    8. Lesion of right native kidney  Indeterminate nodular density extending from the lower pole of the right kidney on chest CTA 6/20/2025.  Dedicated renal CT recommended.  Will arrange after cath is completed and if renal function is stable.       Follow-up in 4 weeks.  The patient has been instructed and agrees to call our office with any issues or other concerns related to their cardiac condition(s) and/or complaint(s).      CHIEF COMPLAINT  Routine follow up    HPI:    Nydia Aburto is a 80 y.o. female here for follow-up of my request.  At the most recent visit on 6/3/2025, she denied chest pain, dyspnea or CHF symptoms.  An echo was ordered to reassess LV function in light of her chronic LBBB.  The echo on 6/12/2025 demonstrated a

## 2025-06-20 ENCOUNTER — HOSPITAL ENCOUNTER (OUTPATIENT)
Facility: HOSPITAL | Age: 80
Discharge: HOME | End: 2025-06-23
Attending: INTERNAL MEDICINE
Payer: MEDICARE

## 2025-06-20 DIAGNOSIS — I42.9 CARDIOMYOPATHY, UNSPECIFIED TYPE (HCC): ICD-10-CM

## 2025-06-20 DIAGNOSIS — R93.1 ABNORMAL ECHOCARDIOGRAM: ICD-10-CM

## 2025-06-20 DIAGNOSIS — J90 LOCULATED PLEURAL EFFUSION: ICD-10-CM

## 2025-06-20 DIAGNOSIS — I99.8 ASYMMETRIC BLOOD PRESSURES: ICD-10-CM

## 2025-06-20 LAB
BUN SERPL-MCNC: 18 MG/DL (ref 6–20)
CREAT SERPL-MCNC: 1.19 MG/DL (ref 0.55–1.02)

## 2025-06-20 PROCEDURE — 36415 COLL VENOUS BLD VENIPUNCTURE: CPT

## 2025-06-20 PROCEDURE — 6360000004 HC RX CONTRAST MEDICATION: Performed by: INTERNAL MEDICINE

## 2025-06-20 PROCEDURE — 71275 CT ANGIOGRAPHY CHEST: CPT

## 2025-06-20 PROCEDURE — 82565 ASSAY OF CREATININE: CPT

## 2025-06-20 PROCEDURE — 84520 ASSAY OF UREA NITROGEN: CPT

## 2025-06-20 RX ORDER — IOPAMIDOL 755 MG/ML
100 INJECTION, SOLUTION INTRAVASCULAR ONCE
Status: COMPLETED | OUTPATIENT
Start: 2025-06-20 | End: 2025-06-20

## 2025-06-20 RX ADMIN — IOPAMIDOL 85 ML: 755 INJECTION, SOLUTION INTRAVENOUS at 10:26

## 2025-06-23 ENCOUNTER — RESULTS FOLLOW-UP (OUTPATIENT)
Age: 80
End: 2025-06-23

## 2025-06-24 ENCOUNTER — OFFICE VISIT (OUTPATIENT)
Age: 80
End: 2025-06-24
Payer: MEDICARE

## 2025-06-24 VITALS
DIASTOLIC BLOOD PRESSURE: 68 MMHG | HEIGHT: 62 IN | HEART RATE: 86 BPM | OXYGEN SATURATION: 97 % | SYSTOLIC BLOOD PRESSURE: 112 MMHG | WEIGHT: 128 LBS | BODY MASS INDEX: 23.55 KG/M2 | TEMPERATURE: 98.4 F

## 2025-06-24 DIAGNOSIS — R91.1 PULMONARY NODULE: ICD-10-CM

## 2025-06-24 DIAGNOSIS — Q25.47 RIGHT-SIDED AORTIC ARCH: ICD-10-CM

## 2025-06-24 DIAGNOSIS — I34.0 NONRHEUMATIC MITRAL VALVE REGURGITATION: ICD-10-CM

## 2025-06-24 DIAGNOSIS — I44.7 LBBB (LEFT BUNDLE BRANCH BLOCK): ICD-10-CM

## 2025-06-24 DIAGNOSIS — Z01.812 PRE-PROCEDURE LAB EXAM: ICD-10-CM

## 2025-06-24 DIAGNOSIS — I99.8 ASYMMETRIC BLOOD PRESSURES: ICD-10-CM

## 2025-06-24 DIAGNOSIS — I10 PRIMARY HYPERTENSION: ICD-10-CM

## 2025-06-24 DIAGNOSIS — N28.9 LESION OF RIGHT NATIVE KIDNEY: ICD-10-CM

## 2025-06-24 DIAGNOSIS — I42.0 DILATED CARDIOMYOPATHY (HCC): Primary | ICD-10-CM

## 2025-06-24 PROBLEM — R73.09 ELEVATED GLUCOSE: Status: RESOLVED | Noted: 2017-12-01 | Resolved: 2025-06-24

## 2025-06-24 PROCEDURE — 3074F SYST BP LT 130 MM HG: CPT | Performed by: INTERNAL MEDICINE

## 2025-06-24 PROCEDURE — 1126F AMNT PAIN NOTED NONE PRSNT: CPT | Performed by: INTERNAL MEDICINE

## 2025-06-24 PROCEDURE — 3078F DIAST BP <80 MM HG: CPT | Performed by: INTERNAL MEDICINE

## 2025-06-24 PROCEDURE — 99214 OFFICE O/P EST MOD 30 MIN: CPT | Performed by: INTERNAL MEDICINE

## 2025-06-24 PROCEDURE — G8420 CALC BMI NORM PARAMETERS: HCPCS | Performed by: INTERNAL MEDICINE

## 2025-06-24 PROCEDURE — G8428 CUR MEDS NOT DOCUMENT: HCPCS | Performed by: INTERNAL MEDICINE

## 2025-06-24 PROCEDURE — 1090F PRES/ABSN URINE INCON ASSESS: CPT | Performed by: INTERNAL MEDICINE

## 2025-06-24 PROCEDURE — G8399 PT W/DXA RESULTS DOCUMENT: HCPCS | Performed by: INTERNAL MEDICINE

## 2025-06-24 PROCEDURE — 1036F TOBACCO NON-USER: CPT | Performed by: INTERNAL MEDICINE

## 2025-06-24 PROCEDURE — 1123F ACP DISCUSS/DSCN MKR DOCD: CPT | Performed by: INTERNAL MEDICINE

## 2025-06-24 RX ORDER — METOPROLOL SUCCINATE 25 MG/1
25 TABLET, EXTENDED RELEASE ORAL DAILY
Qty: 30 TABLET | Refills: 3 | Status: SHIPPED | OUTPATIENT
Start: 2025-06-24

## 2025-06-24 ASSESSMENT — PATIENT HEALTH QUESTIONNAIRE - PHQ9
1. LITTLE INTEREST OR PLEASURE IN DOING THINGS: NOT AT ALL
SUM OF ALL RESPONSES TO PHQ QUESTIONS 1-9: 0
2. FEELING DOWN, DEPRESSED OR HOPELESS: NOT AT ALL
SUM OF ALL RESPONSES TO PHQ QUESTIONS 1-9: 0

## 2025-06-24 NOTE — PROGRESS NOTES
Identified pt with two pt identifiers(name and ). Reviewed record in preparation for visit and have obtained necessary documentation.  Chief Complaint   Patient presents with    Other     LBBB    Valvular Heart Disease    Cardiomyopathy    Hypertension    Cholesterol Problem      /68 (BP Site: Left Upper Arm, Patient Position: Sitting, BP Cuff Size: Medium Adult)   Pulse 86   Temp 98.4 °F (36.9 °C) (Temporal)   Ht 1.575 m (5' 2\")   Wt 58.1 kg (128 lb)   LMP  (LMP Unknown)   SpO2 97%   BMI 23.41 kg/m²       Medications reviewed/approved by provider.      Health Maintenance Review: Patient reminded of \"due or due soon\" health maintenance. I have asked the patient to contact his/her primary care provider (PCP) for follow-up on his/her health maintenance.    Coordination of Care Questionnaire:  :   1) Have you been to an emergency room, urgent care, or hospitalized since your last visit?  If yes, where when, and reason for visit? no       2. Have seen or consulted any other health care provider since your last visit?   If yes, where when, and reason for visit?  no      Patient is accompanied by SELF I have received verbal consent from Nydia Aburto to discuss any/all medical information while they are present in the room.

## 2025-06-24 NOTE — PATIENT INSTRUCTIONS
Continue your current medications and begin metoprolol succinate 25 mg daily.  We will arrange outpatient cardiac catheterization to evaluate the arteries of your heart.

## 2025-06-25 ENCOUNTER — TELEPHONE (OUTPATIENT)
Age: 80
End: 2025-06-25

## 2025-06-25 DIAGNOSIS — I42.0 DILATED CARDIOMYOPATHY (HCC): Primary | ICD-10-CM

## 2025-06-25 NOTE — TELEPHONE ENCOUNTER
aids, or glasses  Bring overnight bag (just in case of an overnight stay)  Where to report  [facility]: go through main entrance and to the left is outpatient registration    Date of procedure: 7/10 w/ Dr. Villarreal  Arrival Time: 8:45 am    Post procedure instructions  No driving for 24 hours  No heavy lifting (over 10lbs) or strenuous activity for 48 hours  No baths, swimming, hot tubs, or spas for a week  The band aid over the cath site may be removed the day after procedure and washed gently with soap and water.  The site may be bruised or discolored for a couple of weeks; a small knot may also be present.  You may have tenderness/soreness in groin or wrist area, you may take Tylenol for relief.    When to call the office  You notice any tingling, numbness, coldness, or loss of feeling, or you have a fever for 2-3 days after the procedure, if there is visible blood at the site, hold pressure for 20 minutes and then call.    Contact  [address] [address]                                                        Cath lab: [phone]                                                    To cancel/reschedule your procedure please call [phone] and ask for Sara                                ----- Message from JESUS FELTON LPN sent at 6/24/2025  4:06 PM EDT -----  Regarding: RHC  CPT: 33233  ICD-10: I42.9  Ordering MD: MD Chauncey  Special Instructions: Hold metoprolol morning of procedure    Labs ordered.

## 2025-06-30 ENCOUNTER — HOSPITAL ENCOUNTER (OUTPATIENT)
Facility: HOSPITAL | Age: 80
Discharge: HOME | End: 2025-07-03
Payer: MEDICARE

## 2025-06-30 LAB
ANION GAP SERPL CALC-SCNC: 8 MMOL/L (ref 2–12)
BASOPHILS # BLD: 0.06 K/UL (ref 0–0.1)
BASOPHILS NFR BLD: 0.9 % (ref 0–1)
BUN SERPL-MCNC: 26 MG/DL (ref 6–20)
BUN/CREAT SERPL: 22 (ref 12–20)
CALCIUM SERPL-MCNC: 10.1 MG/DL (ref 8.5–10.1)
CHLORIDE SERPL-SCNC: 105 MMOL/L (ref 97–108)
CO2 SERPL-SCNC: 27 MMOL/L (ref 21–32)
CREAT SERPL-MCNC: 1.19 MG/DL (ref 0.55–1.02)
DIFFERENTIAL METHOD BLD: ABNORMAL
EOSINOPHIL # BLD: 0.15 K/UL (ref 0–0.4)
EOSINOPHIL NFR BLD: 2.2 % (ref 0–0.7)
ERYTHROCYTE [DISTWIDTH] IN BLOOD BY AUTOMATED COUNT: 14.2 % (ref 11.5–14.5)
GLUCOSE SERPL-MCNC: 79 MG/DL (ref 65–100)
HCT VFR BLD AUTO: 34.6 % (ref 35–47)
HGB BLD-MCNC: 10.8 G/DL (ref 11.5–16)
IMM GRANULOCYTES # BLD AUTO: 0.03 K/UL (ref 0–0.04)
IMM GRANULOCYTES NFR BLD AUTO: 0.4 % (ref 0–0.5)
INR PPP: 1.2 (ref 0.9–1.1)
LYMPHOCYTES # BLD: 1.86 K/UL (ref 0.8–3.5)
LYMPHOCYTES NFR BLD: 27 % (ref 12–49)
MCH RBC QN AUTO: 28.5 PG (ref 26–34)
MCHC RBC AUTO-ENTMCNC: 31.2 G/DL (ref 30–36.5)
MCV RBC AUTO: 91.3 FL (ref 80–99)
MONOCYTES # BLD: 0.5 K/UL (ref 0–1)
MONOCYTES NFR BLD: 7.3 % (ref 5–13)
NEUTS SEG # BLD: 4.28 K/UL (ref 1.8–8)
NEUTS SEG NFR BLD: 62.2 % (ref 32–75)
NRBC # BLD: 0 K/UL (ref 0–0.01)
NRBC BLD-RTO: 0 PER 100 WBC
PLATELET # BLD AUTO: 250 K/UL (ref 150–400)
PMV BLD AUTO: 10.7 FL (ref 8.9–12.9)
POTASSIUM SERPL-SCNC: 4.3 MMOL/L (ref 3.5–5.1)
PROTHROMBIN TIME: 12.6 SEC (ref 9.2–11.2)
RBC # BLD AUTO: 3.79 M/UL (ref 3.8–5.2)
SODIUM SERPL-SCNC: 140 MMOL/L (ref 136–145)
WBC # BLD AUTO: 6.9 K/UL (ref 3.6–11)

## 2025-06-30 PROCEDURE — 85025 COMPLETE CBC W/AUTO DIFF WBC: CPT

## 2025-06-30 PROCEDURE — 84443 ASSAY THYROID STIM HORMONE: CPT

## 2025-06-30 PROCEDURE — 85610 PROTHROMBIN TIME: CPT

## 2025-06-30 PROCEDURE — 80048 BASIC METABOLIC PNL TOTAL CA: CPT

## 2025-06-30 PROCEDURE — 36415 COLL VENOUS BLD VENIPUNCTURE: CPT

## 2025-07-01 LAB — TSH SERPL DL<=0.05 MIU/L-ACNC: 1.32 UIU/ML (ref 0.45–4.5)

## 2025-07-02 ENCOUNTER — RESULTS FOLLOW-UP (OUTPATIENT)
Age: 80
End: 2025-07-02

## 2025-07-10 ENCOUNTER — HOSPITAL ENCOUNTER (OUTPATIENT)
Facility: HOSPITAL | Age: 80
Discharge: HOME | End: 2025-07-10
Attending: INTERNAL MEDICINE | Admitting: INTERNAL MEDICINE
Payer: MEDICARE

## 2025-07-10 VITALS
SYSTOLIC BLOOD PRESSURE: 94 MMHG | OXYGEN SATURATION: 97 % | RESPIRATION RATE: 19 BRPM | BODY MASS INDEX: 23 KG/M2 | DIASTOLIC BLOOD PRESSURE: 44 MMHG | HEIGHT: 62 IN | TEMPERATURE: 97.6 F | HEART RATE: 55 BPM | WEIGHT: 125 LBS

## 2025-07-10 DIAGNOSIS — I42.0 DILATED CARDIOMYOPATHY (HCC): ICD-10-CM

## 2025-07-10 LAB — ECHO BSA: 1.57 M2

## 2025-07-10 PROCEDURE — 2580000003 HC RX 258: Performed by: INTERNAL MEDICINE

## 2025-07-10 PROCEDURE — C1725 CATH, TRANSLUMIN NON-LASER: HCPCS | Performed by: INTERNAL MEDICINE

## 2025-07-10 PROCEDURE — 2709999900 HC NON-CHARGEABLE SUPPLY: Performed by: INTERNAL MEDICINE

## 2025-07-10 PROCEDURE — 99152 MOD SED SAME PHYS/QHP 5/>YRS: CPT | Performed by: INTERNAL MEDICINE

## 2025-07-10 PROCEDURE — 76937 US GUIDE VASCULAR ACCESS: CPT | Performed by: INTERNAL MEDICINE

## 2025-07-10 PROCEDURE — 6360000004 HC RX CONTRAST MEDICATION: Performed by: INTERNAL MEDICINE

## 2025-07-10 PROCEDURE — C1713 ANCHOR/SCREW BN/BN,TIS/BN: HCPCS | Performed by: INTERNAL MEDICINE

## 2025-07-10 PROCEDURE — 99153 MOD SED SAME PHYS/QHP EA: CPT | Performed by: INTERNAL MEDICINE

## 2025-07-10 PROCEDURE — 93460 R&L HRT ART/VENTRICLE ANGIO: CPT | Performed by: INTERNAL MEDICINE

## 2025-07-10 PROCEDURE — C1894 INTRO/SHEATH, NON-LASER: HCPCS | Performed by: INTERNAL MEDICINE

## 2025-07-10 PROCEDURE — 6360000002 HC RX W HCPCS: Performed by: INTERNAL MEDICINE

## 2025-07-10 PROCEDURE — 2500000003 HC RX 250 WO HCPCS: Performed by: INTERNAL MEDICINE

## 2025-07-10 RX ORDER — HEPARIN SODIUM 1000 [USP'U]/ML
INJECTION, SOLUTION INTRAVENOUS; SUBCUTANEOUS PRN
Status: DISCONTINUED | OUTPATIENT
Start: 2025-07-10 | End: 2025-07-10 | Stop reason: HOSPADM

## 2025-07-10 RX ORDER — ACETAMINOPHEN 325 MG/1
650 TABLET ORAL EVERY 4 HOURS PRN
Status: DISCONTINUED | OUTPATIENT
Start: 2025-07-10 | End: 2025-07-10 | Stop reason: HOSPADM

## 2025-07-10 RX ORDER — SODIUM CHLORIDE 0.9 % (FLUSH) 0.9 %
5-40 SYRINGE (ML) INJECTION PRN
Status: DISCONTINUED | OUTPATIENT
Start: 2025-07-10 | End: 2025-07-10 | Stop reason: HOSPADM

## 2025-07-10 RX ORDER — SODIUM CHLORIDE 9 MG/ML
INJECTION, SOLUTION INTRAVENOUS PRN
Status: DISCONTINUED | OUTPATIENT
Start: 2025-07-10 | End: 2025-07-10 | Stop reason: HOSPADM

## 2025-07-10 RX ORDER — IOPAMIDOL 755 MG/ML
INJECTION, SOLUTION INTRAVASCULAR PRN
Status: DISCONTINUED | OUTPATIENT
Start: 2025-07-10 | End: 2025-07-10 | Stop reason: HOSPADM

## 2025-07-10 RX ORDER — SODIUM CHLORIDE 0.9 % (FLUSH) 0.9 %
5-40 SYRINGE (ML) INJECTION EVERY 12 HOURS SCHEDULED
Status: DISCONTINUED | OUTPATIENT
Start: 2025-07-10 | End: 2025-07-10 | Stop reason: HOSPADM

## 2025-07-10 RX ORDER — MIDAZOLAM HYDROCHLORIDE 1 MG/ML
INJECTION, SOLUTION INTRAMUSCULAR; INTRAVENOUS PRN
Status: DISCONTINUED | OUTPATIENT
Start: 2025-07-10 | End: 2025-07-10 | Stop reason: HOSPADM

## 2025-07-10 RX ORDER — FENTANYL CITRATE 50 UG/ML
INJECTION, SOLUTION INTRAMUSCULAR; INTRAVENOUS PRN
Status: DISCONTINUED | OUTPATIENT
Start: 2025-07-10 | End: 2025-07-10 | Stop reason: HOSPADM

## 2025-07-10 RX ORDER — SODIUM CHLORIDE 9 MG/ML
INJECTION, SOLUTION INTRAVENOUS CONTINUOUS
Status: DISCONTINUED | OUTPATIENT
Start: 2025-07-10 | End: 2025-07-10 | Stop reason: HOSPADM

## 2025-07-10 RX ORDER — SODIUM CHLORIDE 9 MG/ML
INJECTION, SOLUTION INTRAVENOUS CONTINUOUS PRN
Status: COMPLETED | OUTPATIENT
Start: 2025-07-10 | End: 2025-07-10

## 2025-07-10 RX ORDER — VERAPAMIL HYDROCHLORIDE 2.5 MG/ML
INJECTION INTRAVENOUS PRN
Status: DISCONTINUED | OUTPATIENT
Start: 2025-07-10 | End: 2025-07-10 | Stop reason: HOSPADM

## 2025-07-10 RX ORDER — LIDOCAINE HYDROCHLORIDE 10 MG/ML
INJECTION, SOLUTION INFILTRATION; PERINEURAL PRN
Status: DISCONTINUED | OUTPATIENT
Start: 2025-07-10 | End: 2025-07-10 | Stop reason: HOSPADM

## 2025-07-10 NOTE — PROGRESS NOTES
0935  Cardiac Cath Lab Recovery Arrival Note:      Nydia Aburto arrived to Cardiac Cath Lab, Recovery Area. Staff introduced to patient. Patient identifiers verified with NAME and DATE OF BIRTH. Procedure verified with patient. Consent forms reviewed and signed by patient or authorized representative and verified. Allergies verified.     Patient and family oriented to department. Patient and family informed of procedure and plan of care.     Questions answered with review. Patient prepped for procedure, per orders from physician, prior to arrival.    Patient on cardiac monitor, non-invasive blood pressure, SPO2 monitor. On room air. Patient is A&Ox 4. Patient reports no complaints.     Patient in stretcher, in low position, with side rails up, call bell within reach, patient instructed to call if assistance as needed.    Patient prep in: [facility] Recovery Area, [place] FT [address].   Patient family has pager # Polina (Friend) [phone]   Family in: Hospital waiting.   Prep by: JAILYN Matthews   Assisted By: JAILYN Martinez and Mgagie RN

## 2025-07-10 NOTE — DISCHARGE INSTRUCTIONS
Cardiac Catheterization Discharge Instructions    Do not drive, operate any machinery, or sign any legal documents for 24 hours after your procedure.  You must have someone to drive you home.    You may take a shower 24 hours after your cardiac catheterization.  Be sure to get the dressing wet and then remove it; gently wash the area with warm soapy water.  Pat dry and leave open to air.  To help prevent infections, be sure to keep the cath site clean and dry.  No lotions, creams, powders, ointments, etc. in the cath site for approximately 1 week.    Do not soak site with such activities as: hand washing dishes, take a tub bath, get in a hot tub or swimming pool for approximately 5 days or until the cath site is completely healed.      No strenuous activity or heavy lifting over 10 lbs. for 7 days.    Put pressure on the cath site when exerting any sort of internal pressure (ie. Sneezing, Coughing, Laughing, or having a bowel movement)     Drink plenty of fluids for 24-48 hours after your cath to flush the contrast dye from your kidneys. No alcoholic beverages for 24 hours.  You may resume your previous diet (low fat, low cholesterol) after your cath.      After your cath, some bruising or discomfort is common during the healing process.  An ice pack and Tylenol, 1-2 tablets every 6 hours as needed, is recommended if you experience any discomfort.  If you experience any signs or symptoms of infection such as fever, chills, or poorly healing incision, persistent tenderness or swelling around the cath site, redness and/or warmth to the touch, numbness, significant tingling or pain at the cath site or affected extremity, rash, drainage from the cath site, or if the affected arm or leg feels tight or swollen, call your physician right away.    30 minutes of activity a day is recommended. If it is too hot or too cold outside, perform activities in doors.    If bleeding at the cath site occurs, take a clean gauze pad and

## 2025-07-10 NOTE — PROGRESS NOTES
1255  Pt arrived to recovery room post procedure.  Arm immobilizer placed on affected wrist.    TR BANDS: Right Radial Artery  1330  Began removal of air from TR BAND. No bleeding and no hematoma present at sites.    1345  Pt walked to the restroom and voided   successfully.     1430  Removal of air from TR Band complete. No bleeding and no hematoma.    1445  TR Band removed. No bleeding and no hematoma present. Tegaderm and gauze dressing applied.   RN removed pt IV.  Pt getting dressed  RN called pt ride home.    1455  Educated patient about their sedation precautions such as not driving, operating any machinery, or signing legal documents 24 hours post procedure.  Reviewed discharge instructions, including medications and site care using the teach back method. Answered all questions. Verbalized understanding. Pt had an opportunity to ask questions. Pt is also aware of how to handle a site if it starts bleeding or develops a hematoma.    1512  Arm immobilizer placed on affected wrist.  Pt discharged to ride at main entrance of hospital via wheel chair by RN.  Pt discharged withDischarge Paperwork, Extra Band Aids, Clothing, Wrist Immobilizer, Cell Phone, and red folder of important papers

## 2025-07-10 NOTE — PROGRESS NOTES
1255  [facility] Procedural to Recovery REPORT:    Verbal report received from TIM Viera on Nydia Aburto  being received from [facility] Procedural Area for routine progression of care. Report consisted of patient’s Situation, Background, Assessment and Recommendations(SBAR). Information from the following report(s) Procedure, Findings, Medications, and Site Assessment; was reviewed with the receiving clinician. Opportunity for questions and clarification was provided. Assessment completed upon patient’s arrival to Cardiac Cath Lab RECOVERY AREA and care assumed.       Cardiac Cath Lab Recovery Arrival Note:    Nydia Aburto arrived to [facility] recovery area.  Patient procedure= LHC. Patient on cardiac monitor, non-invasive blood pressure, SPO2 monitor. On room air.  IV  of KVO on pump. Patient status doing well without problems. Patient is A&Ox 4. Patient reports no complaints.    PROCEDURE SITE CHECK:    Procedure site:without any bleeding and no hematoma, no pain/discomfort reported at procedure site.     No change in patient status. Continue to monitor patient and status.

## 2025-07-10 NOTE — PROGRESS NOTES
Cardiac Cath Lab Procedure Area Arrival Note:    Nydia Aburto arrived to Cardiac Cath Lab, Procedure Area. Patient identifiers verified with NAME and DATE OF BIRTH. Procedure verified with patient. Consent forms verified. Allergies verified. Patient informed of procedure and plan of care. Questions answered with review. Patient voiced understanding of procedure and plan of care.    Patient on cardiac monitor, non-invasive blood pressure, SPO2 monitor. On oxygen or O2 @ 2 lpm via nc.  IV of ns on pump at 25 ml/hr. Patient status doing well without problems. Patient is A&Ox 4. Patient reports n complaints.     Patient medicated during procedure with orders obtained and verified by Dr. Villarreal.    Refer to patients Cardiac Cath Lab PROCEDURE REPORT for vital signs, assessment, status, and response during procedure, printed at end of case. Printed report on chart or scanned into chart.

## 2025-07-10 NOTE — PROGRESS NOTES
CATH LAB to RECOVERY ROOM REPORT    Procedure: [place] [facility]    MD: LEANN Villarreal MD    The procedure was diagnostic only.    Verbal Report given to bedside nurse on patient being transferred to Cardiac Cath Lab for routine post-op. Patient stable upon transfer to .  Vitals, mental status, MAR, procedural summary discussed with bedside nurse.      Medication given during procedure:    Contrast: 25 mL                          Versed: 2 mg                                                               Fentanyl: 25 mcg         Radial cocktail:                        Verapamil: 2.5 mg    Nitroglycerin: 200 mg        Heparin: 2500 units     Other Meds/Drips given:    NaCl 0.9% at 50 mL/hr & received 250 mL bolus    Sheaths:    Right radial sheath pulled at 1237 band at 10 mL of air    Right Brachial vein sheath pulled at 1240, secured with a band-aid.

## 2025-07-12 PROBLEM — N28.9 LESION OF RIGHT NATIVE KIDNEY: Status: ACTIVE | Noted: 2025-07-12

## 2025-07-12 NOTE — PROGRESS NOTES
ASSESSMENT and PLAN  1.  Dilated nonischemic cardiomyopathy  Dilated LV (LVED 6.3 cm) and EF 20-25% on echo 6/12/2025, new from 7/2022.  Near normal coronaries at cath 7/10/2025.  Suspect LV dysfunction is bundle-branch mediated.  Continue losartan and metoprolol succinate 25 mg daily.  Refer to EP to consider CRT.    2. LBBB (left bundle branch block)  Chronic.    3.  Nonrheumatic mitral valve regurgitation  Mild MR on echo 6/12/2025.  Stable    4.  Primary hypertension  Asymmetrical upper extremity BP readings (R>L).  She denies left arm symptoms.  Continue current medications.  Monitor BP in right arm for hypertension management.     5. Asymmetric blood pressures  Asymmetrical upper extremity BP readings (R>L).  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  She denies left arm symptoms.  Monitor BP in right arm for hypertension management.    6. Right-sided aortic arch  Right-sided aortic arch noted on CTA chest 6/20/2025.    7. Pulmonary nodule  15 x 8 mm RLL nodule and small GUSTAVO groundglass opacity on chest CTA 6/20/2025.  Plan follow-up CT in 3 months as recommended.    8. Lesion of right native kidney  Indeterminate nodular density extending from the lower pole of the right kidney on chest CTA 6/20/2025.  Dedicated renal CT recommended - ordered.       Follow-up in 2 months.  The patient has been instructed and agrees to call our office with any issues or other concerns related to their cardiac condition(s) and/or complaint(s).      CHIEF COMPLAINT  Routine follow up    HPI:    Nydia Aburto is a 80 y.o. female here for follow-up of cardiomyopathy.  At the visit on 6/3/2025, she denied chest pain, dyspnea or CHF symptoms.  An echo performed on 6/12/2025 to reevaluate LV function in light of chronic LBBB demonstrated a dilated LV (LVED 6.3 cm) and EF 20-25%.  These represented new findings from to the previous echo on 7/21/2022.

## 2025-07-15 ENCOUNTER — OFFICE VISIT (OUTPATIENT)
Age: 80
End: 2025-07-15
Payer: MEDICARE

## 2025-07-15 VITALS
WEIGHT: 127 LBS | HEIGHT: 62 IN | HEART RATE: 56 BPM | SYSTOLIC BLOOD PRESSURE: 136 MMHG | TEMPERATURE: 97.8 F | OXYGEN SATURATION: 96 % | BODY MASS INDEX: 23.37 KG/M2 | DIASTOLIC BLOOD PRESSURE: 60 MMHG

## 2025-07-15 DIAGNOSIS — I99.8 ASYMMETRIC BLOOD PRESSURES: ICD-10-CM

## 2025-07-15 DIAGNOSIS — N28.9 LESION OF RIGHT NATIVE KIDNEY: ICD-10-CM

## 2025-07-15 DIAGNOSIS — I42.0 DILATED CARDIOMYOPATHY (HCC): Primary | ICD-10-CM

## 2025-07-15 DIAGNOSIS — I34.0 NONRHEUMATIC MITRAL VALVE REGURGITATION: ICD-10-CM

## 2025-07-15 DIAGNOSIS — Q25.47 RIGHT-SIDED AORTIC ARCH: ICD-10-CM

## 2025-07-15 DIAGNOSIS — R91.1 PULMONARY NODULE: ICD-10-CM

## 2025-07-15 DIAGNOSIS — I44.7 LBBB (LEFT BUNDLE BRANCH BLOCK): ICD-10-CM

## 2025-07-15 DIAGNOSIS — I10 PRIMARY HYPERTENSION: ICD-10-CM

## 2025-07-15 PROCEDURE — G8399 PT W/DXA RESULTS DOCUMENT: HCPCS | Performed by: INTERNAL MEDICINE

## 2025-07-15 PROCEDURE — 3075F SYST BP GE 130 - 139MM HG: CPT | Performed by: INTERNAL MEDICINE

## 2025-07-15 PROCEDURE — 99214 OFFICE O/P EST MOD 30 MIN: CPT | Performed by: INTERNAL MEDICINE

## 2025-07-15 PROCEDURE — G8428 CUR MEDS NOT DOCUMENT: HCPCS | Performed by: INTERNAL MEDICINE

## 2025-07-15 PROCEDURE — 3078F DIAST BP <80 MM HG: CPT | Performed by: INTERNAL MEDICINE

## 2025-07-15 PROCEDURE — 1090F PRES/ABSN URINE INCON ASSESS: CPT | Performed by: INTERNAL MEDICINE

## 2025-07-15 PROCEDURE — 1123F ACP DISCUSS/DSCN MKR DOCD: CPT | Performed by: INTERNAL MEDICINE

## 2025-07-15 PROCEDURE — 1036F TOBACCO NON-USER: CPT | Performed by: INTERNAL MEDICINE

## 2025-07-15 PROCEDURE — G8420 CALC BMI NORM PARAMETERS: HCPCS | Performed by: INTERNAL MEDICINE

## 2025-07-15 ASSESSMENT — PATIENT HEALTH QUESTIONNAIRE - PHQ9
SUM OF ALL RESPONSES TO PHQ QUESTIONS 1-9: 0
1. LITTLE INTEREST OR PLEASURE IN DOING THINGS: NOT AT ALL
2. FEELING DOWN, DEPRESSED OR HOPELESS: NOT AT ALL
SUM OF ALL RESPONSES TO PHQ QUESTIONS 1-9: 0

## 2025-07-15 NOTE — PATIENT INSTRUCTIONS
I have ordered a dedicated renal CT scan.  We will contact you with the results.  We have placed a referral to see one of our electrophysiologists.

## 2025-07-15 NOTE — PROGRESS NOTES
Identified pt with two pt identifiers(name and ). Reviewed record in preparation for visit and have obtained necessary documentation.  Chief Complaint   Patient presents with    Other     LBBB    Cardiomyopathy    Valvular Heart Disease    Hypertension    Cholesterol Problem      /60 (BP Site: Right Upper Arm, Patient Position: Sitting, BP Cuff Size: Medium Adult)   Pulse 56   Temp 97.8 °F (36.6 °C) (Temporal)   Ht 1.575 m (5' 2\")   Wt 57.6 kg (127 lb)   LMP  (LMP Unknown)   SpO2 96%   BMI 23.23 kg/m²       Medications reviewed/approved by provider.      Health Maintenance Review: Patient reminded of \"due or due soon\" health maintenance. I have asked the patient to contact his/her primary care provider (PCP) for follow-up on his/her health maintenance.    Coordination of Care Questionnaire:  :   1) Have you been to an emergency room, urgent care, or hospitalized since your last visit?  If yes, where when, and reason for visit? no       2. Have seen or consulted any other health care provider since your last visit?   If yes, where when, and reason for visit?  no      Patient is accompanied by daughter I have received verbal consent from Nydia Aburto to discuss any/all medical information while they are present in the room.

## 2025-07-18 ENCOUNTER — HOSPITAL ENCOUNTER (OUTPATIENT)
Facility: HOSPITAL | Age: 80
Discharge: HOME | End: 2025-07-21
Attending: INTERNAL MEDICINE
Payer: MEDICARE

## 2025-07-18 DIAGNOSIS — N28.9 LESION OF RIGHT NATIVE KIDNEY: ICD-10-CM

## 2025-07-18 PROCEDURE — 6360000004 HC RX CONTRAST MEDICATION: Performed by: INTERNAL MEDICINE

## 2025-07-18 PROCEDURE — 74170 CT ABD WO CNTRST FLWD CNTRST: CPT

## 2025-07-18 RX ORDER — IOPAMIDOL 755 MG/ML
100 INJECTION, SOLUTION INTRAVASCULAR ONCE
Status: COMPLETED | OUTPATIENT
Start: 2025-07-18 | End: 2025-07-18

## 2025-07-18 RX ADMIN — IOPAMIDOL 100 ML: 755 INJECTION, SOLUTION INTRAVENOUS at 12:08

## (undated) DEVICE — MICROSURGICAL INSTRUMENT ANTERIOR CHAMBER CANNULA 30GA: Brand: ALCON

## (undated) DEVICE — TAPE ADH CLTH SILK H2O REPELLENT CURAD

## (undated) DEVICE — Device

## (undated) DEVICE — KIT PT CARE CATRCT POSTOP CUST

## (undated) DEVICE — CATHETER DIAG 5FR L100CM LUMN ID0.047IN JR4 CRV 0 SIDE H

## (undated) DEVICE — CATHETER DIAG 5FR L100CM LUMN ID0.047IN JL3.5 CRV 0 SIDE H

## (undated) DEVICE — MICROSURGICAL INSTRUMENT CAPSULE POLISHER-37 BEND, 21GA W .3MM SIDE PORT: Brand: ALCON

## (undated) DEVICE — SPLINT WR VELC FOAM NEUT POS DISP FOR RAD ART ACC SFT STRP

## (undated) DEVICE — KIT MFLD ISOLATN NACL CNTRST PRT TBNG SPIK W/ PRSS TRNSDUC

## (undated) DEVICE — CATHETER ART THERMODILUTION 6 FRX110 CM 4 LUMEN SWAN

## (undated) DEVICE — SOL IRR SOD CL 0.9% 250ML BTL --

## (undated) DEVICE — SOLUTION IRRIG 250ML STRL H2O PLAS POUR BTL USP

## (undated) DEVICE — MICROSURGICAL INSTRUMENT IRR. CYSTITOME 25GA FORMED-REVERSE CUTTING: Brand: ALCON

## (undated) DEVICE — STERILE POLYISOPRENE POWDER-FREE SURGICAL GLOVES: Brand: PROTEXIS

## (undated) DEVICE — OPHTHALMIC KNIFE 15°: Brand: ALCON

## (undated) DEVICE — ROYAL SILK SURGICAL GOWN, XL: Brand: CONVERTORS

## (undated) DEVICE — CO-SET DELIVERY SYSTEM FOR 123 ROOM TEMPATURE INJECTATE: Brand: CO-SET+

## (undated) DEVICE — WASTE KIT - ST MARY: Brand: MEDLINE INDUSTRIES, INC.

## (undated) DEVICE — KIT AT-X65 ANGIOTOUCH HAND CONTROLLER

## (undated) DEVICE — PROVE COVER: Brand: UNBRANDED

## (undated) DEVICE — KIT MED IMAG CNTRST AGNT W/ IOPAMIDOL REUSE

## (undated) DEVICE — TR BAND RADIAL ARTERY COMPRESSION DEVICE: Brand: TR BAND

## (undated) DEVICE — GLIDESHEATH SLENDER ACCESS KIT: Brand: GLIDESHEATH SLENDER

## (undated) DEVICE — KIT HND CTRL 3 W STPCOCK ROT END 54IN PREM HI PRSS TBNG AT

## (undated) DEVICE — TAPE ADH W1INXL10YD CLTH SILK H2O RESIST CURAD

## (undated) DEVICE — INFECTION CONTROL KIT SYS

## (undated) DEVICE — 45° KELMAN®, 0.9 MM TURBOSONICS® MINI-FLARED ABS® TIP: Brand: ALCON, KELMAN, TURBOSONICS, MINI-FLARED ABS

## (undated) DEVICE — B-H IRRIGATING CAN 19GA FLAT ANGLED 8MM: Brand: OPHTHALMIC CANNULA

## (undated) DEVICE — PADPRO DEFIBRILLATION/PACING/CARDIOVERSION/MONITORING ELECTRODES, ADULT/CHILD GREATER THAN 10 KG RADIOTRANSPARENT ELECTRODE, PHYSIO-CONTROL QUIK-COMBO (M) 60" (152 CM): Brand: PADPRO

## (undated) DEVICE — ANGIOGRAPHY KIT

## (undated) DEVICE — TIPLESS W/ 0.9 MM HIGH INFUSION SLEEVES: Brand: ALCON, INFINITI, MICROSMOOTH

## (undated) DEVICE — SPECIAL PROCEDURE DRAPE 32" X 34": Brand: SPECIAL PROCEDURE DRAPE

## (undated) DEVICE — BETADINE 5% EYE SOL